# Patient Record
Sex: FEMALE | Race: WHITE | HISPANIC OR LATINO | Employment: OTHER | URBAN - METROPOLITAN AREA
[De-identification: names, ages, dates, MRNs, and addresses within clinical notes are randomized per-mention and may not be internally consistent; named-entity substitution may affect disease eponyms.]

---

## 2018-01-30 ENCOUNTER — OFFICE VISIT (OUTPATIENT)
Dept: UROLOGY | Facility: CLINIC | Age: 78
End: 2018-01-30
Payer: MEDICARE

## 2018-01-30 ENCOUNTER — APPOINTMENT (OUTPATIENT)
Dept: LAB | Facility: HOSPITAL | Age: 78
End: 2018-01-30
Attending: NURSE PRACTITIONER
Payer: MEDICARE

## 2018-01-30 VITALS
RESPIRATION RATE: 16 BRPM | TEMPERATURE: 98 F | HEIGHT: 63 IN | BODY MASS INDEX: 22.32 KG/M2 | WEIGHT: 126 LBS | HEART RATE: 87 BPM | DIASTOLIC BLOOD PRESSURE: 84 MMHG | SYSTOLIC BLOOD PRESSURE: 128 MMHG

## 2018-01-30 DIAGNOSIS — N95.2 ATROPHIC VAGINITIS: ICD-10-CM

## 2018-01-30 DIAGNOSIS — R39.89 SENSATION OF PRESSURE IN BLADDER AREA: Primary | ICD-10-CM

## 2018-01-30 DIAGNOSIS — N39.0 RECURRENT UTI (URINARY TRACT INFECTION): ICD-10-CM

## 2018-01-30 LAB
BACTERIA #/AREA URNS HPF: ABNORMAL /HPF
BILIRUB SERPL-MCNC: NEGATIVE MG/DL
BLOOD URINE, POC: NEGATIVE
COLOR, POC UA: ABNORMAL
GLUCOSE UR QL STRIP: NEGATIVE
KETONES UR QL STRIP: NEGATIVE
LEUKOCYTE ESTERASE URINE, POC: ABNORMAL
MICROSCOPIC COMMENT: ABNORMAL
NITRITE, POC UA: NEGATIVE
PH, POC UA: 6
PROTEIN, POC: ABNORMAL
RBC #/AREA URNS HPF: 0 /HPF (ref 0–4)
SPECIFIC GRAVITY, POC UA: 1.01
SQUAMOUS #/AREA URNS HPF: 1 /HPF
UROBILINOGEN, POC UA: NEGATIVE
WBC #/AREA URNS HPF: 50 /HPF (ref 0–5)

## 2018-01-30 PROCEDURE — 81000 URINALYSIS NONAUTO W/SCOPE: CPT

## 2018-01-30 PROCEDURE — 87088 URINE BACTERIA CULTURE: CPT

## 2018-01-30 PROCEDURE — 99204 OFFICE O/P NEW MOD 45 MIN: CPT | Mod: 25,S$PBB,, | Performed by: NURSE PRACTITIONER

## 2018-01-30 PROCEDURE — 51701 INSERT BLADDER CATHETER: CPT | Mod: PBBFAC,PN | Performed by: NURSE PRACTITIONER

## 2018-01-30 PROCEDURE — 51701 INSERT BLADDER CATHETER: CPT | Mod: S$PBB,,, | Performed by: NURSE PRACTITIONER

## 2018-01-30 PROCEDURE — 81001 URINALYSIS AUTO W/SCOPE: CPT | Mod: PBBFAC,PN | Performed by: NURSE PRACTITIONER

## 2018-01-30 PROCEDURE — 1126F AMNT PAIN NOTED NONE PRSNT: CPT | Mod: ,,, | Performed by: NURSE PRACTITIONER

## 2018-01-30 PROCEDURE — 99215 OFFICE O/P EST HI 40 MIN: CPT | Mod: PBBFAC,PN | Performed by: NURSE PRACTITIONER

## 2018-01-30 PROCEDURE — 99999 PR PBB SHADOW E&M-EST. PATIENT-LVL V: CPT | Mod: PBBFAC,,, | Performed by: NURSE PRACTITIONER

## 2018-01-30 PROCEDURE — 1159F MED LIST DOCD IN RCRD: CPT | Mod: ,,, | Performed by: NURSE PRACTITIONER

## 2018-01-30 PROCEDURE — 87086 URINE CULTURE/COLONY COUNT: CPT

## 2018-01-30 PROCEDURE — 87186 SC STD MICRODIL/AGAR DIL: CPT

## 2018-01-30 PROCEDURE — 87077 CULTURE AEROBIC IDENTIFY: CPT

## 2018-01-30 RX ORDER — PHENAZOPYRIDINE HYDROCHLORIDE 95 MG/1
95 TABLET ORAL 3 TIMES DAILY PRN
COMMUNITY
End: 2022-10-26

## 2018-01-30 RX ORDER — ALPRAZOLAM 0.25 MG/1
TABLET ORAL 2 TIMES DAILY PRN
COMMUNITY
Start: 2018-01-12 | End: 2020-12-21 | Stop reason: SDUPTHER

## 2018-01-30 RX ORDER — AMLODIPINE AND BENAZEPRIL HYDROCHLORIDE 10; 40 MG/1; MG/1
CAPSULE ORAL
COMMUNITY
Start: 2018-01-12 | End: 2020-12-21 | Stop reason: ALTCHOICE

## 2018-01-30 RX ORDER — FERROUS GLUCONATE 324(38)MG
324 TABLET ORAL
COMMUNITY
End: 2021-03-25 | Stop reason: SDUPTHER

## 2018-01-30 RX ORDER — ESTRADIOL 0.1 MG/G
1 CREAM VAGINAL
Qty: 12 G | Refills: 6 | Status: SHIPPED | OUTPATIENT
Start: 2018-01-31 | End: 2018-01-30 | Stop reason: SDUPTHER

## 2018-01-30 RX ORDER — CIPROFLOXACIN 500 MG/1
500 TABLET ORAL 2 TIMES DAILY
Qty: 6 TABLET | Refills: 0 | Status: SHIPPED | OUTPATIENT
Start: 2018-01-30 | End: 2018-02-02 | Stop reason: ALTCHOICE

## 2018-01-30 RX ORDER — ESTRADIOL 0.1 MG/G
1 CREAM VAGINAL
Qty: 12 G | Refills: 6 | Status: SHIPPED | OUTPATIENT
Start: 2018-01-31 | End: 2018-04-11 | Stop reason: ALTCHOICE

## 2018-01-30 NOTE — LETTER
January 30, 2018      Jacob Littlejohn MD  [address] [facility] [address]  [place] [address]           [place] [facility]  [address] [place] [address] [address]  [place] [address]  Phone: [phone]  Fax: [phone]          Patient: Martha Alonso   MR Number: [id number]   YOB: 1940   Date of Visit: 1/30/2018       Dear Dr. Jacob Littlejohn:    Thank you for referring Martha Alonso to me for evaluation. Attached you will find relevant portions of my assessment and plan of care.    If you have questions, please do not hesitate to call me. I look forward to following Martha Alonso along with you.    Sincerely,    Ruby Vance, [facility]    Enclosure  CC:  No Recipients    If you would like to receive this communication electronically, please contact [email or web address] or [phone] to request more information on [employer] access.    For providers and/or their staff who would like to refer a patient to [facility], please contact us through our one-stop-shop provider referral line, [facility], at [phone].    If you feel you have received this communication in error or would no longer like to receive these types of communications, please e-mail [email or web address]

## 2018-01-30 NOTE — PROGRESS NOTES
"[facility] Note  Staff: STEVO Apple    Referring provider and please cc: Dr. Jacob Littlejohn  PCP: Dr. Jacob Littlejohn    Chief Complaint: Recurrent UTIs    Subjective:        HPI: Martha Alonso is a 77 y.o. female NEW PATIENT to Urology clinic today referred by Dr. Jacob Littlejohn's office for recurrent urinary tract infections.  Pt is accompanied by daughter to clinic visit today, to assist her with her English, primary language is Divehi for patient.  Daughter states with pt today that in 2017 pt had "5" urinary tract infections and/or her symptoms presented as UTI symptoms.  Prior to 2017 pt never had UTIs or problems with her urination habits.    Her symptoms of onset normally include--bladder pressure/pain, increased urinary frequency and urgency.  Pt denies hematuria or incontinence issues.  Nocturia 1x nightly.    History of Urine Cultures from Dr. Littlejohn's office:  04/12/17:  Multiple organisms  05/19/17:  Multiple organisms  06/28/17:  Multiple organisms  09/18/17:  No growth    Caffeine intake daily: 2 coffee in am, the rest is water.  Constipation issues: None    REVIEW OF SYSTEMS:  Review of Systems   Constitutional: Negative for chills, diaphoresis, fever and weight loss.   HENT: Negative for congestion, hearing loss, nosebleeds and sore throat.    Eyes: Negative for blurred vision and pain.        Wears glasses   Respiratory: Negative for cough and wheezing.    Cardiovascular: Negative for chest pain, palpitations and leg swelling.   Gastrointestinal: Negative for abdominal pain, heartburn, nausea and vomiting.   Genitourinary: Positive for frequency and urgency. Negative for dysuria, flank pain and hematuria.        Bladder pressure   Musculoskeletal: Negative for back pain, joint pain, myalgias and neck pain.   Skin: Negative for itching and rash.   Neurological: Negative for dizziness, tremors, sensory change, seizures, loss of consciousness, weakness and " headaches.   Endo/Heme/Allergies: Does not bruise/bleed easily.   Psychiatric/Behavioral: Negative for depression and suicidal ideas. The patient is not nervous/anxious.      PMHx:  Past Medical History:   Diagnosis Date    Hypertension     Rheumatoid arthritis      Kidney stones: No    PSHx:  History reviewed. No pertinent surgical history.    Stents/Valves/Foreign Bodies: No  Cardiac Evaluation: No    Screening Studies  Colonoscopy: Yes, last procedure was done 2 years ago, showed normal findings.    Fam Hx:   malignancies: No , gyn malignancies: No   kidney stones: No     Social History     Social History    Marital status:      Spouse name: N/A    Number of children: N/A    Years of education: N/A     Social History Main Topics    Smoking status: Never Smoker    Smokeless tobacco: None    Alcohol use None    Drug use: Unknown    Sexual activity: Not Asked     Other Topics Concern    None     Social History Narrative    None     Allergies:  Patient has no known allergies.    Medications: reviewed   Anticoagulation: No    Objective:     Vitals:    01/30/18 1525   BP: 128/84   Pulse: 87   Resp: 16   Temp: 97.7 °F (36.5 °C)     Physical Exam   Vitals reviewed.  Constitutional: She is oriented to person, place, and time. She appears well-developed and well-nourished.   HENT:   Head: Normocephalic and atraumatic.   Eyes: Conjunctivae and EOM are normal. Pupils are equal, round, and reactive to light.   Neck: Normal range of motion. Neck supple.   Cardiovascular: Normal rate, regular rhythm, normal heart sounds and intact distal pulses.    Pulmonary/Chest: Effort normal and breath sounds normal.   Abdominal: Soft. Bowel sounds are normal.   Musculoskeletal: Normal range of motion.   Neurological: She is alert and oriented to person, place, and time. She has normal reflexes.   Skin: Skin is warm and dry.     Psychiatric: She has a normal mood and affect. Her behavior is normal. Judgment and thought  "content normal.     Pelvic exam  External Genitalia: normal hair distribution, no lesions  Urethral meatus: +Prolapse observed  Urethra: without tenderness or mass  Bladder: without fullness or tenderness  +Atrophic vaginitis  Anus and perineum: appear normal  In and out cath performed by me with 10 mL of residual collected    LABS REVIEW:  UA today:   Color, UA cloudy    Spec Grav UA 1.015    pH, UA 6.0    WBC, UA 2+    Nitrite, UA negative    Protein trace    Glucose, UA negative    Ketones, UA negative    Urobilinogen, UA negative    Bilirubin negative    Blood, UA negative      Assessment:       1. Sensation of pressure in bladder area    2. Recurrent UTI (urinary tract infection)    3. Atrophic vaginitis        Plan:   Recurrent UTIs vs. Interstitial cystitis symptoms, past cultures contaminated.    1.  UA Microscopic, Urine culture to be performed today.  2.  Renal US to be performed-pt will have done at [facility].  Orders given to pt and family to have done today after visit.  3.  Cipro 500 mg 1 po BID x 3 days prescribed to pt today until I receive culture results per family's request at this time.  4.  Estrace Cream prescribed to the patient today for vaginitis.    I have thoroughly explained to pt and family today due to hx of cultures returning as "multiple organisms" it is really inconclusive as to whether these were validated UTIs.  Therefore from now on, pt will need to have catheterized specimens in order to test in future.    F/u TBD after we review lab/imaging results for next POC.  Pt and family pleased with this POC at this time.    [facility]: N[facility]A    For your recurrent UTIs:  Behavioral changes to help decrease UTI recurrences   -increase water intake (6 to 8 bottles water per day)   -don't hold urine, void every 2 to 3 hours   -prevent constipation (miralax capful daily if necessary) to have a bowel movement daily and keep stools soft  -cut down on caffeine,drink mainly water  -no douching   -void " immediately after sexual intercourse  -wipe front to back     OTC meds to try (go to the pharmacist and ask where they are, they are over the counter)  -Use lactobacillus probiotic in capsule form in vagina once nightly for 10 days if you feel like you have an infection coming on   -cranberry pills 500mg tabs TID, can buy over the counter  -take a probiotic daily  -D-Mannose once daily over the counter    IMPORTANT: If you feel like you have a UTI coming on, call our office and talk to one of the nurses. We will have you drop off a urine here in clinic or at one of our [facility] facilities. I do not typically like to write for antibiotics unless we have a urine culture. Avoid going to urgent care. We need to start documenting your urine cultures here in our office to see if they are really recurrent UTIs or sx of UTIs.     If it is over the weekend and you cannot wait, call our on call doctor, however we still prefer urine cultures before giving antibiotics.     If you have fever and it doesn't improve with tylenol or ibuprofen or if you have flank pain associated with the uti symptoms go to the ER.     If you do continue to have positive urine cultures then we may proceed with doing a cystoscopy (to look in your bladder) and an imaging study.     Ruby Vance, JEAN CLAUDEC

## 2018-01-30 NOTE — PATIENT INSTRUCTIONS
Infecciones de las vías urinarias en las mujeres    Las infecciones de las vías urinarias suelen ocurrir a causa de bacterias que invaden las vías urinarias, deshaun sea desde fuera del cuerpo o desplazándose desde el recto o la vagina hasta el interior de la uretra. La anatomía de la echo hace más fácil la entrada de bacterias en las vías urinarias, por lo que estas infecciones son más frecuentes en las mujeres que en los hombres. El síntoma más común de la mayoría de estas infecciones es el dolor en las vías urinarias, etta la única manera de saber con certeza si hay brock infección es hacer un cultivo de orina (urocultivo).  Yoel tipos de infecciones de las vías urinarias  · Cistitis: La infección de la vejiga (cistitis) es la forma de infección urinaria más común en las mujeres. Puede provocar un deseo urgente o frecuente de orinar, así naomi fiebre, dolor, escozor al orinar y lizet en la orina.  · Uretritis: La uretritis consiste en la inflamación de la uretra. Puede causar dolor en la parte inferior del vientre o de la espalda, así naomi un deseo urgente o frecuente de orinar.  · Pielonefritis: La pielonefritis es brock infección de los riñones que puede causar daños graves a los mismos si se lyndsey sin tratar. En los casos más graves puede requerir hospitalización. Los síntomas pueden consistir en fiebre y dolor en la parte inferior de la espalda.  Medicamentos para tratar brock infección de las vías urinarias  La mayoría de las infecciones de las vías urinarias se tratan con antibióticos para eliminar las bacterias. La duración del tratamiento con antibióticos dependerá del tipo de infección que tenga, y puede ser de solo 3 días. Si tiene infecciones urinarias frecuentes, podría ser necesario un tratamiento antibiótico con dosis reducida eliazar varios meses. [place] los antibióticos hasta terminarlos y siga exactamente las indicaciones que le hayan dado. De lo contrario la infección puede persistir y hacerse más  difícil de tratar.  Cambios del estilo de medina para tratar y prevenir las infecciones urinarias  Los cambios de estilo de medina descritos a continuación pueden ayudarle a combatir vail infección actual de las vías urinarias y a prevenir nuevas infecciones en el futuro:  · Erica abundante líquido, naomi agua, jugo de fruta u otras bebidas sin cafeína. Miranda ayuda a eliminar las bacterias.   · Vacíe vail vejiga. Siempre vacíe la vejiga cuando sienta deseo de hacerlo y antes de acostarse. La orina que permanece en la vejiga promueve la infección. Trate de orinar antes y después de tener relaciones sexuales.  · Practique brock buena higiene personal. Límpiese deshaun con un movimiento desde adelante hacia atrás después de usar el inodoro. Miranda ayuda aevitar que las bacterias entren a la uretra.  · Utilice condones eliazar el sexo. Miranda ayuda a prevenir las infecciones en las vías urinarias provocadas por bacterias transmitidas sexualmente. Además, evite el uso de espermicidas en las relaciones sexuales, ya que pueden aumentar el riesgo de infecciones. Elija otra forma de control de la natalidad. Las mujeres que tienden a desarrollar infecciones en las vías urinarias después del sexo podrían utilizar un antibiótico preventivo de dosis baja. Asegúrese de hablar sobre estas opciones con vail proveedor de atención médica.  · Vaya a todas las visitas de control que le recomienden con vail proveedor de atención médica para que revise si las bacterias que causan la infección garrett sido eliminadas, y para que le dé tratamiento si los problemas reaparecen.  Date Last Reviewed: 9/8/2014  © 7093-2131 [employer]. [address] [place] [address], [place] [address]. Todos los derechos reservados. Esta información no pretende sustituir la atención médica profesional. Sólo vail médico puede diagnosticar y tratar un problema de hilary.        Infección De La Vejiga,Audra [Bladder Infection: Female, Adult]    Brock infección de la vejiga (cistitis  [cystitis - UTI]) suele provocar constantes deseos de orinar y ardor al orinar. Es posible que la orina se marcelina turbia u oscura, o que tenga olor kerline. Puede clayton dolor en la parte baja del abdomen. Brock infección de la vejiga se produce cuando las bacterias del área vaginal ingresan al orificio donde desemboca la vejiga (la uretra [urethra]). Puede ocurrir después de clayton tenido relaciones sexuales, por usar ropas muy ajustadas, por deshidratación y otros factores.  Cuidados En [place]  · Erica abundante líquido (al menos, entre 6 y 8 vasos por día, excepto que le hayan indicado limitar los líquidos por otras razones médicas). Eso hará que el medicamento ingrese mejor al sistema urinario y arrastrará las bacterias fuera de vail cuerpo.  · Evite tener relaciones sexuales hasta que los síntomas hayan desaparecido.  · No consuma cafeína, alcohol ni comidas muy condimentadas, ya que pueden irritar la vejiga.  · Brock infección de la vejiga (bladder infection) se trata con antibióticos (antibiotics). También es posible que le receten Pyridum (nombre genérico: fenazopiridina [phenazopyridine]) para aliviar el ardor. Kareen medicamento hará que vail orina sea de color naranja brillante. Es posible que sean orina de color naranja le manche la ropa. Puede usar un protector diario o brock toalla femenina para proteger la ropa.  Evite Futuras Infecciones  · Después de evacuar el intestino, siempre límpiese con un movimiento de adelante hacia atrás.  · Mantenga la jose l genital limpia y seca.  · Erica mucho líquidos todos los días para evitar la deshidratación (dehydration).  · Ambos integrantes de la lashawn deben lavarse antes del acto sexual.  · Orine brianna después del acto sexual para limpiar la vejiga.  · Use ropa interior de algodón y pantimedias con recubrimiento de algodón. Evite usar pantalones muy ajustados.  · Si está tomando píldoras anticonceptivas y tiene frecuentes infecciones de vejiga, háblelo con vail médico.  Visita De  Control  Visite a vail médico si no garrett desparecido todos los síntomas después de pardeep días de tratamiento.  Busque Prontamente Atención Médica  si algo de lo siguiente ocurre:  · Fiebre de 100.4°F (38°C) o más venessa, o naomi le haya indicado vail proveedor de atención médica.  · No hay mejoría después de pardeep días de tratamiento.  · Mayor dolor en la espalda o el abdomen.  · Vómito persistente (no puede mantener el medicamento en el estómago).  · Debilidad, mareo o desmayo.  · Secreción vaginal.  · Dolor, enrojecimiento o hinchazón en los labios vaginales (jose l exterior de la vagina).  Date Last Reviewed: 4/8/2014  © 1135-6561 [employer]. [address] [place] [address]. Todos los derechos reservados. Esta información no pretende sustituir la atención médica profesional. Sólo vail médico puede diagnosticar y tratar un problema de hilary.

## 2018-02-02 LAB — BACTERIA UR CULT: NORMAL

## 2018-02-02 RX ORDER — AMOXICILLIN AND CLAVULANATE POTASSIUM 875; 125 MG/1; MG/1
1 TABLET, FILM COATED ORAL 2 TIMES DAILY
Qty: 20 TABLET | Refills: 0 | Status: SHIPPED | OUTPATIENT
Start: 2018-02-02 | End: 2018-02-12

## 2018-02-02 RX ORDER — FLUCONAZOLE 150 MG/1
150 TABLET ORAL DAILY
Qty: 3 TABLET | Refills: 0 | Status: SHIPPED | OUTPATIENT
Start: 2018-02-02 | End: 2018-02-05

## 2018-02-06 ENCOUNTER — TELEPHONE (OUTPATIENT)
Dept: UROLOGY | Facility: CLINIC | Age: 78
End: 2018-02-06

## 2018-02-06 NOTE — TELEPHONE ENCOUNTER
----- Message from Sarah Lara sent at 2/6/2018 11:13 AM CST -----  Returning your call.  Please call Jim at [phone].

## 2018-04-05 ENCOUNTER — TELEPHONE (OUTPATIENT)
Dept: UROLOGY | Facility: CLINIC | Age: 78
End: 2018-04-05

## 2018-04-05 ENCOUNTER — TELEPHONE (OUTPATIENT)
Dept: UROGYNECOLOGY | Facility: CLINIC | Age: 78
End: 2018-04-05

## 2018-04-05 NOTE — TELEPHONE ENCOUNTER
----- Message from Misti Urban sent at 4/5/2018  3:52 PM CDT -----  Contact: Lexie Muñoz (Daughter)  Type:  Test Results    Who Called:  Lexie Muñoz (Daughter)  Name of Test (Lab/Mammo/Etc):  Ultrasound of Bladder  Date of Test: 3/27/18  Ordering Provider: Ruby Gamez  Where the test was performed:  [facility]  Best Call Back Number:    Additional Information:  Call to pod. No answer.

## 2018-04-05 NOTE — TELEPHONE ENCOUNTER
----- Message from Cristi Montoya sent at 4/4/2018  4:06 PM CDT -----  Contact: Pt Daughter  Patients daughter returning call. Call placed to pod, no answer    Call back# [phone]  Thanks

## 2018-04-05 NOTE — TELEPHONE ENCOUNTER
Left detailed message do not show where a call was from Urology.  Can call us [phone] if you know what call was in reference to.

## 2018-04-05 NOTE — TELEPHONE ENCOUNTER
Spoke to daughter, is calling for the results of the u/s. Called [facility] and placed the results on desk to be reviewed.

## 2018-04-10 DIAGNOSIS — N39.0 URINARY TRACT INFECTION WITHOUT HEMATURIA, SITE UNSPECIFIED: Primary | ICD-10-CM

## 2018-04-10 NOTE — TELEPHONE ENCOUNTER
Patient's daughter advised.  Patient is still c/o urinary pressure.  Would like to come in for a urine specimen tomorrow.       Please place orders for whatever you would like ordered.

## 2018-04-11 ENCOUNTER — APPOINTMENT (OUTPATIENT)
Dept: LAB | Facility: HOSPITAL | Age: 78
End: 2018-04-11
Attending: NURSE PRACTITIONER
Payer: MEDICARE

## 2018-04-11 ENCOUNTER — CLINICAL SUPPORT (OUTPATIENT)
Dept: UROLOGY | Facility: CLINIC | Age: 78
End: 2018-04-11
Payer: MEDICARE

## 2018-04-11 ENCOUNTER — TELEPHONE (OUTPATIENT)
Dept: UROLOGY | Facility: CLINIC | Age: 78
End: 2018-04-11

## 2018-04-11 DIAGNOSIS — N39.0 URINARY TRACT INFECTION WITHOUT HEMATURIA, SITE UNSPECIFIED: ICD-10-CM

## 2018-04-11 LAB
AMORPH CRY URNS QL MICRO: ABNORMAL
BACTERIA #/AREA URNS HPF: ABNORMAL /HPF
MICROSCOPIC COMMENT: ABNORMAL
RBC #/AREA URNS HPF: 2 /HPF (ref 0–4)
SQUAMOUS #/AREA URNS HPF: 1 /HPF
WBC #/AREA URNS HPF: 1 /HPF (ref 0–5)

## 2018-04-11 PROCEDURE — 99999 PR PBB SHADOW E&M-EST. PATIENT-LVL I: CPT | Mod: PBBFAC,,,

## 2018-04-11 PROCEDURE — 87086 URINE CULTURE/COLONY COUNT: CPT

## 2018-04-11 PROCEDURE — 81000 URINALYSIS NONAUTO W/SCOPE: CPT

## 2018-04-11 PROCEDURE — 99211 OFF/OP EST MAY X REQ PHY/QHP: CPT | Mod: PBBFAC,PN

## 2018-04-11 NOTE — PROGRESS NOTES
Patient here today for catheterized urine to be sent for culture.     Patient draped and prepped in sterile fashion.   10Fr catheter placed into bladder.    Sterile urine specimen obtained.  Catheter removed.   Patient left the office in satisfactory condition.  Specimen prepared to send to lab.

## 2018-04-11 NOTE — TELEPHONE ENCOUNTER
The estrogen cream she was prescribed in January is $300.  She has not been able to purchase this.  Her daughter is requesting a less expensive Rx.

## 2018-04-13 LAB — BACTERIA UR CULT: NO GROWTH

## 2020-01-14 ENCOUNTER — LAB VISIT (OUTPATIENT)
Dept: LAB | Facility: HOSPITAL | Age: 80
End: 2020-01-14
Attending: INTERNAL MEDICINE
Payer: MEDICARE

## 2020-01-14 DIAGNOSIS — Z13.220 SCREENING FOR LIPOID DISORDERS: ICD-10-CM

## 2020-01-14 DIAGNOSIS — I10 ESSENTIAL HYPERTENSION, MALIGNANT: Primary | ICD-10-CM

## 2020-01-14 DIAGNOSIS — D64.9 ANEMIA, UNSPECIFIED: ICD-10-CM

## 2020-01-14 LAB
ALBUMIN SERPL BCP-MCNC: 3.9 G/DL (ref 3.5–5.2)
ALP SERPL-CCNC: 63 U/L (ref 55–135)
ALT SERPL W/O P-5'-P-CCNC: 10 U/L (ref 10–44)
ANION GAP SERPL CALC-SCNC: 7 MMOL/L (ref 8–16)
AST SERPL-CCNC: 15 U/L (ref 10–40)
BASOPHILS # BLD AUTO: 0.01 K/UL (ref 0–0.2)
BASOPHILS NFR BLD: 0.2 % (ref 0–1.9)
BILIRUB SERPL-MCNC: 0.7 MG/DL (ref 0.1–1)
BILIRUB UR QL STRIP: NEGATIVE
BUN SERPL-MCNC: 14 MG/DL (ref 8–23)
CALCIUM SERPL-MCNC: 9.3 MG/DL (ref 8.7–10.5)
CHLORIDE SERPL-SCNC: 107 MMOL/L (ref 95–110)
CHOLEST SERPL-MCNC: 192 MG/DL (ref 120–199)
CHOLEST/HDLC SERPL: 4.2 {RATIO} (ref 2–5)
CLARITY UR: CLEAR
CO2 SERPL-SCNC: 24 MMOL/L (ref 23–29)
COLOR UR: YELLOW
CREAT SERPL-MCNC: 0.8 MG/DL (ref 0.5–1.4)
DIFFERENTIAL METHOD: ABNORMAL
EOSINOPHIL # BLD AUTO: 0.1 K/UL (ref 0–0.5)
EOSINOPHIL NFR BLD: 1.7 % (ref 0–8)
ERYTHROCYTE [DISTWIDTH] IN BLOOD BY AUTOMATED COUNT: 16.3 % (ref 11.5–14.5)
EST. GFR  (AFRICAN AMERICAN): >60 ML/MIN/1.73 M^2
EST. GFR  (NON AFRICAN AMERICAN): >60 ML/MIN/1.73 M^2
GLUCOSE SERPL-MCNC: 92 MG/DL (ref 70–110)
GLUCOSE UR QL STRIP: NEGATIVE
HCT VFR BLD AUTO: 38.4 % (ref 37–48.5)
HDLC SERPL-MCNC: 46 MG/DL (ref 40–75)
HDLC SERPL: 24 % (ref 20–50)
HGB BLD-MCNC: 11.5 G/DL (ref 12–16)
HGB UR QL STRIP: NEGATIVE
IMM GRANULOCYTES # BLD AUTO: 0.01 K/UL (ref 0–0.04)
IMM GRANULOCYTES NFR BLD AUTO: 0.2 % (ref 0–0.5)
KETONES UR QL STRIP: NEGATIVE
LDLC SERPL CALC-MCNC: 120.4 MG/DL (ref 63–159)
LEUKOCYTE ESTERASE UR QL STRIP: NEGATIVE
LYMPHOCYTES # BLD AUTO: 2.4 K/UL (ref 1–4.8)
LYMPHOCYTES NFR BLD: 51.8 % (ref 18–48)
MCH RBC QN AUTO: 25.4 PG (ref 27–31)
MCHC RBC AUTO-ENTMCNC: 29.9 G/DL (ref 32–36)
MCV RBC AUTO: 85 FL (ref 82–98)
MONOCYTES # BLD AUTO: 0.3 K/UL (ref 0.3–1)
MONOCYTES NFR BLD: 6.7 % (ref 4–15)
NEUTROPHILS # BLD AUTO: 1.8 K/UL (ref 1.8–7.7)
NEUTROPHILS NFR BLD: 39.4 % (ref 38–73)
NITRITE UR QL STRIP: NEGATIVE
NONHDLC SERPL-MCNC: 146 MG/DL
NRBC BLD-RTO: 0 /100 WBC
PH UR STRIP: 8 [PH] (ref 5–8)
PLATELET # BLD AUTO: 218 K/UL (ref 150–350)
PMV BLD AUTO: 11.2 FL (ref 9.2–12.9)
POTASSIUM SERPL-SCNC: 4.1 MMOL/L (ref 3.5–5.1)
PROT SERPL-MCNC: 6.8 G/DL (ref 6–8.4)
PROT UR QL STRIP: NEGATIVE
RBC # BLD AUTO: 4.52 M/UL (ref 4–5.4)
SODIUM SERPL-SCNC: 138 MMOL/L (ref 136–145)
SP GR UR STRIP: 1.02 (ref 1–1.03)
TRIGL SERPL-MCNC: 128 MG/DL (ref 30–150)
URN SPEC COLLECT METH UR: NORMAL
UROBILINOGEN UR STRIP-ACNC: NEGATIVE EU/DL
WBC # BLD AUTO: 4.63 K/UL (ref 3.9–12.7)

## 2020-01-14 PROCEDURE — 80061 LIPID PANEL: CPT

## 2020-01-14 PROCEDURE — 85025 COMPLETE CBC W/AUTO DIFF WBC: CPT

## 2020-01-14 PROCEDURE — 36415 COLL VENOUS BLD VENIPUNCTURE: CPT

## 2020-01-14 PROCEDURE — 81003 URINALYSIS AUTO W/O SCOPE: CPT

## 2020-01-14 PROCEDURE — 80053 COMPREHEN METABOLIC PANEL: CPT

## 2020-07-14 ENCOUNTER — LAB VISIT (OUTPATIENT)
Dept: LAB | Facility: HOSPITAL | Age: 80
End: 2020-07-14
Attending: INTERNAL MEDICINE
Payer: MEDICARE

## 2020-07-14 DIAGNOSIS — I10 ESSENTIAL HYPERTENSION, MALIGNANT: ICD-10-CM

## 2020-07-14 DIAGNOSIS — K86.3 CYST AND PSEUDOCYST OF PANCREAS: Primary | ICD-10-CM

## 2020-07-14 DIAGNOSIS — K86.2 CYST AND PSEUDOCYST OF PANCREAS: Primary | ICD-10-CM

## 2020-07-14 DIAGNOSIS — D64.9 ANEMIA, UNSPECIFIED: ICD-10-CM

## 2020-07-14 DIAGNOSIS — Z13.220 SCREENING FOR LIPOID DISORDERS: ICD-10-CM

## 2020-07-14 LAB
ALBUMIN SERPL BCP-MCNC: 4 G/DL (ref 3.5–5.2)
ALP SERPL-CCNC: 68 U/L (ref 55–135)
ALT SERPL W/O P-5'-P-CCNC: 10 U/L (ref 10–44)
ANION GAP SERPL CALC-SCNC: 10 MMOL/L (ref 8–16)
AST SERPL-CCNC: 14 U/L (ref 10–40)
BASOPHILS # BLD AUTO: 0.03 K/UL (ref 0–0.2)
BASOPHILS NFR BLD: 0.6 % (ref 0–1.9)
BILIRUB SERPL-MCNC: 0.7 MG/DL (ref 0.1–1)
BILIRUB UR QL STRIP: NEGATIVE
BUN SERPL-MCNC: 16 MG/DL (ref 8–23)
CALCIUM SERPL-MCNC: 9.7 MG/DL (ref 8.7–10.5)
CHLORIDE SERPL-SCNC: 105 MMOL/L (ref 95–110)
CHOLEST SERPL-MCNC: 226 MG/DL (ref 120–199)
CHOLEST/HDLC SERPL: 5.1 {RATIO} (ref 2–5)
CLARITY UR: CLEAR
CO2 SERPL-SCNC: 25 MMOL/L (ref 23–29)
COLOR UR: YELLOW
CREAT SERPL-MCNC: 1 MG/DL (ref 0.5–1.4)
DIFFERENTIAL METHOD: ABNORMAL
EOSINOPHIL # BLD AUTO: 0.1 K/UL (ref 0–0.5)
EOSINOPHIL NFR BLD: 1.5 % (ref 0–8)
ERYTHROCYTE [DISTWIDTH] IN BLOOD BY AUTOMATED COUNT: 16.1 % (ref 11.5–14.5)
EST. GFR  (AFRICAN AMERICAN): >60 ML/MIN/1.73 M^2
EST. GFR  (NON AFRICAN AMERICAN): 53 ML/MIN/1.73 M^2
GLUCOSE SERPL-MCNC: 95 MG/DL (ref 70–110)
GLUCOSE UR QL STRIP: NEGATIVE
HCT VFR BLD AUTO: 40.4 % (ref 37–48.5)
HDLC SERPL-MCNC: 44 MG/DL (ref 40–75)
HDLC SERPL: 19.5 % (ref 20–50)
HGB BLD-MCNC: 12.4 G/DL (ref 12–16)
HGB UR QL STRIP: NEGATIVE
IMM GRANULOCYTES # BLD AUTO: 0.02 K/UL (ref 0–0.04)
IMM GRANULOCYTES NFR BLD AUTO: 0.4 % (ref 0–0.5)
KETONES UR QL STRIP: NEGATIVE
LDLC SERPL CALC-MCNC: 150.4 MG/DL (ref 63–159)
LEUKOCYTE ESTERASE UR QL STRIP: NEGATIVE
LYMPHOCYTES # BLD AUTO: 2.4 K/UL (ref 1–4.8)
LYMPHOCYTES NFR BLD: 46.4 % (ref 18–48)
MCH RBC QN AUTO: 26 PG (ref 27–31)
MCHC RBC AUTO-ENTMCNC: 30.7 G/DL (ref 32–36)
MCV RBC AUTO: 85 FL (ref 82–98)
MONOCYTES # BLD AUTO: 0.4 K/UL (ref 0.3–1)
MONOCYTES NFR BLD: 7.7 % (ref 4–15)
NEUTROPHILS # BLD AUTO: 2.3 K/UL (ref 1.8–7.7)
NEUTROPHILS NFR BLD: 43.4 % (ref 38–73)
NITRITE UR QL STRIP: NEGATIVE
NONHDLC SERPL-MCNC: 182 MG/DL
NRBC BLD-RTO: 0 /100 WBC
PH UR STRIP: 7 [PH] (ref 5–8)
PLATELET # BLD AUTO: 216 K/UL (ref 150–350)
PMV BLD AUTO: 10.8 FL (ref 9.2–12.9)
POTASSIUM SERPL-SCNC: 4.1 MMOL/L (ref 3.5–5.1)
PROT SERPL-MCNC: 7.1 G/DL (ref 6–8.4)
PROT UR QL STRIP: NEGATIVE
RBC # BLD AUTO: 4.77 M/UL (ref 4–5.4)
SODIUM SERPL-SCNC: 140 MMOL/L (ref 136–145)
SP GR UR STRIP: 1.02 (ref 1–1.03)
TRIGL SERPL-MCNC: 158 MG/DL (ref 30–150)
URN SPEC COLLECT METH UR: ABNORMAL
UROBILINOGEN UR STRIP-ACNC: ABNORMAL EU/DL
WBC # BLD AUTO: 5.19 K/UL (ref 3.9–12.7)

## 2020-07-14 PROCEDURE — 36415 COLL VENOUS BLD VENIPUNCTURE: CPT

## 2020-07-14 PROCEDURE — 85025 COMPLETE CBC W/AUTO DIFF WBC: CPT

## 2020-07-14 PROCEDURE — 80053 COMPREHEN METABOLIC PANEL: CPT

## 2020-07-14 PROCEDURE — 81003 URINALYSIS AUTO W/O SCOPE: CPT

## 2020-07-14 PROCEDURE — 80061 LIPID PANEL: CPT

## 2020-12-21 ENCOUNTER — OFFICE VISIT (OUTPATIENT)
Dept: FAMILY MEDICINE | Facility: CLINIC | Age: 80
End: 2020-12-21
Payer: MEDICARE

## 2020-12-21 VITALS
BODY MASS INDEX: 23.63 KG/M2 | HEIGHT: 63 IN | HEART RATE: 83 BPM | DIASTOLIC BLOOD PRESSURE: 74 MMHG | OXYGEN SATURATION: 96 % | WEIGHT: 133.38 LBS | SYSTOLIC BLOOD PRESSURE: 130 MMHG | TEMPERATURE: 97 F

## 2020-12-21 DIAGNOSIS — D50.9 IRON DEFICIENCY ANEMIA, UNSPECIFIED IRON DEFICIENCY ANEMIA TYPE: ICD-10-CM

## 2020-12-21 DIAGNOSIS — M19.071 PRIMARY OSTEOARTHRITIS OF BOTH FEET: ICD-10-CM

## 2020-12-21 DIAGNOSIS — K86.2 CYST OF PANCREAS: ICD-10-CM

## 2020-12-21 DIAGNOSIS — I10 ESSENTIAL HYPERTENSION: ICD-10-CM

## 2020-12-21 DIAGNOSIS — E78.2 MIXED HYPERLIPIDEMIA: ICD-10-CM

## 2020-12-21 DIAGNOSIS — F41.9 ANXIETY: ICD-10-CM

## 2020-12-21 DIAGNOSIS — Z76.89 ENCOUNTER TO ESTABLISH CARE: Primary | ICD-10-CM

## 2020-12-21 DIAGNOSIS — M19.072 PRIMARY OSTEOARTHRITIS OF BOTH FEET: ICD-10-CM

## 2020-12-21 PROBLEM — D64.9 ANEMIA: Status: RESOLVED | Noted: 2020-12-21 | Resolved: 2020-12-21

## 2020-12-21 PROBLEM — D64.9 ANEMIA: Status: ACTIVE | Noted: 2020-12-21

## 2020-12-21 PROCEDURE — 99204 OFFICE O/P NEW MOD 45 MIN: CPT | Mod: S$GLB,,, | Performed by: NURSE PRACTITIONER

## 2020-12-21 PROCEDURE — 99204 PR OFFICE/OUTPT VISIT, NEW, LEVL IV, 45-59 MIN: ICD-10-PCS | Mod: S$GLB,,, | Performed by: NURSE PRACTITIONER

## 2020-12-21 RX ORDER — BENAZEPRIL HYDROCHLORIDE 20 MG/1
TABLET ORAL
COMMUNITY
Start: 2020-12-15 | End: 2020-12-21 | Stop reason: SDUPTHER

## 2020-12-21 RX ORDER — AMLODIPINE BESYLATE 5 MG/1
5 TABLET ORAL DAILY
Qty: 90 TABLET | Refills: 1 | Status: SHIPPED | OUTPATIENT
Start: 2020-12-21 | End: 2021-09-20

## 2020-12-21 RX ORDER — ALPRAZOLAM 0.25 MG/1
0.25 TABLET ORAL 2 TIMES DAILY
Qty: 60 TABLET | Refills: 0 | Status: SHIPPED | OUTPATIENT
Start: 2020-12-21 | End: 2021-01-19

## 2020-12-21 RX ORDER — AMLODIPINE BESYLATE 5 MG/1
TABLET ORAL
COMMUNITY
Start: 2020-12-15 | End: 2020-12-21 | Stop reason: SDUPTHER

## 2020-12-21 RX ORDER — DICLOFENAC SODIUM 10 MG/G
GEL TOPICAL
COMMUNITY
Start: 2020-12-15 | End: 2021-07-08 | Stop reason: SDUPTHER

## 2020-12-21 RX ORDER — BENAZEPRIL HYDROCHLORIDE 20 MG/1
20 TABLET ORAL DAILY
Qty: 90 TABLET | Refills: 1 | Status: SHIPPED | OUTPATIENT
Start: 2020-12-21 | End: 2021-09-20

## 2020-12-21 NOTE — PATIENT INSTRUCTIONS
Guidelines for General Prevention of COVID-19    o Take steps to protect yourself from COVID-19. Perform hand hygiene frequently. Wash your hands often with soap and water for at least 20 seconds of use and alcohol-based hand , covering all surfaces of your hands and rubbing them together until they feel dry.  o Avoid touching your eyes, nose, and mouth with unwashed hands.  o Avoid close contact with people and stay home if youre sick, except to get medical care.   o Cover coughs and sneezes with a tissue, or use the inside of your elbow. Immediately wash your hands or use hand .     For more information, see CDC link below:    https://www.cdc.gov/coronavirus/2019-ncov/hcp/guidance-prevent-spread.html#precautions  Low-Cholesterol Diet  Your body needs cholesterol to build new cells and create certain hormones. There are 2 kinds of cholesterol in your blood:     · HDL (good) cholesterol. This prevents fat deposits (plaque) from building up in your arteries. In this way it protects against heart disease and stroke.  · LDL (bad) cholesterol. This stays in your body and sticks to artery walls. Over time it may block blood flow to the heart and brain. This can cause a heart attack or stroke.  The cholesterol in your blood comes from 2 sources: cholesterol in food that you eat and cholesterol that your liver makes. You should limit the amount of cholesterol in your diet. But the cholesterol that your body makes has the greatest disease risk. And your body makes more cholesterol when your diet is high in bad fats (saturated and trans fats). There are 2 kinds of fats you can eat:  · Good fats, or unsaturated fats (mono-unsaturated and poly-unsaturated). They raise the level of good cholesterol and lower the level of bad cholesterol. Good fats are found in vegetable oils such as olive, sunflower, corn, and soybean oils, and in nuts and seeds.  · Bad fats, or saturated fats (including foods high in  cholesterol) and trans fats. These raise your risk of disease. They lower the good cholesterol and raise the level of bad cholesterol. Bad fats are found in animal products, including meat, whole-milk dairy products, and butter. Some plants are also high in bad fats (coconut and palm plants). Trans fats are found in hard (stick) margarines. They are also in many fast foods and commercially baked goods. Soft margarine sold in tubs has fewer trans fats and is safer to use.  High blood cholesterol is usually due to a diet high in saturated fat, along with not being physically active. In some cases, genetics plays a role in causing high cholesterol. The tips below will help you create healthy eating habits that will help lower your blood cholesterol level.  Create a diet high in good fats, low in bad fats (and low in cholesterol)  The following steps will help you create a diet high in good fats and low in bad fats:  · Talk with your doctor before starting a low cholesterol diet or weight loss program.  · Learn to read nutrition labels and select appropriate portion sizes.  · When cooking, use plant-based unsaturated vegetable oils (sunflower, corn, soybean, canola, peanut, and olive oils).  · Avoid saturated fats found in animal products such as meat, dairy (whole-milk, cheese and ice cream), poultry skin, and egg yolks. Plants high in saturated oils include coconut oil, palm oil, and palm kernel oil.  · If you eat meat, choose smaller portions and lean cuts, such as round, luma, sirloin, or loin. Eat more meatless meals.  · Replace meat with fish at least 2 times a week. Fish is an important source of the unsaturated fat called omega-3 fatty acids. This fat has potential to lower the risk of heart disease.  · Replace whole-milk dairy products with low-fat or nonfat products. Try soy products. Soy helps to reduce total cholesterol.  · Supplement your diet with protective fibers. Eat nuts, seeds, and whole grains  rather than white rice and bread. These foods lower both cholesterol and triglyceride levels. (Triglycerides are another fat found in the blood.) Walnuts are one of the best sources of omega-3 fatty acids.  · Eat plenty of fresh fruits and vegetables daily.  · Avoid fast foods and commercial baked goods. Assume they contain saturated fat unless labeled otherwise.  Date Last Reviewed: 8/1/2016  © 1762-2639 [employer]. [address] [place] [address], [place] [address]. All rights reserved. This information is not intended as a substitute for professional medical care. Always follow your healthcare professional's instructions.

## 2020-12-21 NOTE — PROGRESS NOTES
SUBJECTIVE:      Patient ID: Martha Alonso is a 80 y.o. female.    Chief Complaint: Establish Care (needs new order for CT Abdomen w/ and without, ICD-10: K86.2) and Hypertension    Martha Alonso, a former patient of Dr. Littlejohn, presents to the clinic to establish care.  Her medical problems are detailed in Past Medical History and Problem List.  She is present with her daughter.      She reports increased anxiety due to COVID-19 and a recent death in the family due to COVID-19.  She has needed to take her Xanax twice daily.      She needs a refill of her amlodipine and benazepril.  Blood pressures have been controlled.      She has a history of anemia.  Previously on iron supplements, but stopped 5 months ago.  Hgb 12.4 & Hct 40.4 in July.      She had a cyst on her pancreas that Dr. Littlejohn was monitoring.  She was supposed to get a CT of abdomen for further evaluation, but never got the CT due to COVID-19.          No family history on file.   Social History     Socioeconomic History    Marital status:      Spouse name: Not on file    Number of children: Not on file    Years of education: Not on file    Highest education level: Not on file   Occupational History    Not on file   Social Needs    Financial resource strain: Not on file    Food insecurity     Worry: Not on file     Inability: Not on file    Transportation needs     Medical: Not on file     Non-medical: Not on file   Tobacco Use    Smoking status: Never Smoker   Substance and Sexual Activity    Alcohol use: Not on file    Drug use: Not on file    Sexual activity: Not on file   Lifestyle    Physical activity     Days per week: Not on file     Minutes per session: Not on file    Stress: Not on file   Relationships    Social connections     Talks on phone: Not on file     Gets together: Not on file     Attends [employer] service: Not on file     Active member of club or organization: Not on file     Attends meetings of clubs or  organizations: Not on file     Relationship status: Not on file   Other Topics Concern    Not on file   Social History Narrative    Not on file     Current Outpatient Medications   Medication Sig Dispense Refill    ALPRAZolam (XANAX) 0.25 MG tablet Take 1 tablet (0.25 mg total) by mouth 2 (two) times a day. 60 tablet 0    amLODIPine (NORVASC) 5 MG tablet Take 1 tablet (5 mg total) by mouth once daily. 90 tablet 1    benazepriL (LOTENSIN) 20 MG tablet Take 1 tablet (20 mg total) by mouth once daily. 90 tablet 1    diclofenac sodium (VOLTAREN) 1 % Gel       ferrous gluconate (FERGON) 324 MG tablet Take 324 mg by mouth daily with breakfast.      phenazopyridine (PYRIDIUM) 95 MG tablet Take 95 mg by mouth 3 (three) times daily as needed for Pain.       No current facility-administered medications for this visit.      Review of patient's allergies indicates:  No Known Allergies   Past Medical History:   Diagnosis Date    Hypertension     Rheumatoid arthritis      No past surgical history on file.    Review of Systems   Constitutional: Negative for activity change, appetite change, chills, diaphoresis, fatigue, fever and unexpected weight change.   HENT: Negative for congestion, ear pain, sinus pressure, sore throat, trouble swallowing and voice change.    Eyes: Negative for pain, discharge and visual disturbance.   Respiratory: Negative for cough, chest tightness and wheezing.    Cardiovascular: Negative for chest pain, palpitations and leg swelling.   Gastrointestinal: Negative for abdominal pain, constipation, diarrhea and vomiting.   Genitourinary: Negative for difficulty urinating, dysuria, flank pain, frequency, hematuria and urgency.   Musculoskeletal: Negative for back pain, joint swelling and neck pain.   Skin: Negative for color change and rash.   Neurological: Negative for seizures, syncope, weakness, numbness and headaches.   Hematological: Negative for adenopathy.   Psychiatric/Behavioral: Negative  "for confusion, dysphoric mood and sleep disturbance. The patient is nervous/anxious.         Anxiety      OBJECTIVE:      Vitals:    12/21/20 1513   BP: 130/74   BP Location: Left arm   Patient Position: Sitting   BP Method: Large (Manual)   Pulse: 83   Temp: 97.3 °F (36.3 °C)   SpO2: 96%   Weight: 60.5 kg (133 lb 6.4 oz)   Height: 5' 3" (1.6 m)     Physical Exam  Vitals signs and nursing note reviewed.   Constitutional:       General: She is awake. She is not in acute distress.     Appearance: Normal appearance. She is not ill-appearing, toxic-appearing or diaphoretic.   HENT:      Head: Normocephalic and atraumatic.      Nose: Nose normal.   Eyes:      General: Lids are normal. Gaze aligned appropriately.      Conjunctiva/sclera: Conjunctivae normal.      Right eye: Right conjunctiva is not injected.      Left eye: Left conjunctiva is not injected.      Pupils: Pupils are equal, round, and reactive to light.   Neck:      Musculoskeletal: Neck supple.      Thyroid: No thyromegaly.   Cardiovascular:      Rate and Rhythm: Normal rate and regular rhythm.      Pulses: Normal pulses.      Heart sounds: Normal heart sounds, S1 normal and S2 normal. No murmur. No friction rub. No gallop.    Pulmonary:      Effort: Pulmonary effort is normal. No respiratory distress.      Breath sounds: Normal breath sounds. No stridor. No decreased breath sounds, wheezing, rhonchi or rales.   Abdominal:      General: Bowel sounds are normal.      Palpations: Abdomen is soft.      Tenderness: There is no abdominal tenderness. There is no guarding or rebound.   Musculoskeletal:      Right lower leg: No edema.      Left lower leg: No edema.   Lymphadenopathy:      Cervical: No cervical adenopathy.   Skin:     General: Skin is warm and dry.      Capillary Refill: Capillary refill takes less than 2 seconds.      Findings: No erythema or rash.   Neurological:      Mental Status: She is alert and oriented to person, place, and time. Mental " status is at baseline.   Psychiatric:         Attention and Perception: Attention normal.         Mood and Affect: Mood normal.         Speech: Speech normal.         Behavior: Behavior normal. Behavior is cooperative.         Thought Content: Thought content normal. Thought content does not include suicidal ideation. Thought content does not include suicidal plan.        Assessment:       1. Encounter to establish care    2. Essential hypertension    3. Anxiety    4. Mixed hyperlipidemia    5. Anemia, unspecified type    6. Cyst of pancreas    7. Primary osteoarthritis of both feet        Plan:       Encounter to establish care   New patient.  Will start getting her routine care and overdue health maintenance completed.  Advised patient about age and season appropriate immunizations/ cancer screenings. Influenza yearly and Tdap vaccine ever 10 years.  Patient refused DXA scan.     Essential hypertension   Stable, continue current treatment.   -     amLODIPine (NORVASC) 5 MG tablet; Take 1 tablet (5 mg total) by mouth once daily.  Dispense: 90 tablet; Refill: 1  -     benazepriL (LOTENSIN) 20 MG tablet; Take 1 tablet (20 mg total) by mouth once daily.  Dispense: 90 tablet; Refill: 1  -     CBC Auto Differential; Future; Expected date: 12/21/2020  -     Comprehensive Metabolic Panel; Future; Expected date: 12/21/2020  -     Urinalysis; Future  -     Lipid Panel; Future; Expected date: 12/21/2020    Anxiety   Xanax quantity increased, due to increased anxiety.  Patient to follow-up [id number] for continued refills.  Patient and family verbalized understanding.     -     ALPRAZolam (XANAX) 0.25 MG tablet; Take 1 tablet (0.25 mg total) by mouth 2 (two) times a day.  Dispense: 60 tablet; Refill: 0    Mixed hyperlipidemia   LDL elevated.  Low cholesterol diet provided on discharge instructions.  Will recheck lipid panel.   -     Comprehensive Metabolic Panel; Future; Expected date: 12/21/2020  -     Lipid Panel; Future;  Expected date: 12/21/2020    Iron deficiency anemia, unspecified iron deficiency anemia type   Stable and controlled at this time.  Will recheck CBC and restart iron supplements if needed.   -     CBC Auto Differential; Future; Expected date: 12/21/2020    Cyst of pancreas   CT scan reordered.  Previous order was written by Dr. Littlejohn who is retiring.  Patient to fast 4 hours before CT scan.  Will need kidney function checked prior to CT as well.    -     CT Abdomen With Without Contrast; Future; Expected date: 12/21/2020    Primary osteoarthritis of both feet   Stable, uses diclofenac gel as needed.     This note was created using [employer] voice recognition software that occasionally misinterprets phrases or words.     Follow up in about 3 months (around 3/21/2021) for anxiety .      12/21/2020 ELINA Johnston, AARONP

## 2021-01-12 ENCOUNTER — TELEPHONE (OUTPATIENT)
Dept: FAMILY MEDICINE | Facility: CLINIC | Age: 81
End: 2021-01-12

## 2021-03-24 ENCOUNTER — PATIENT MESSAGE (OUTPATIENT)
Dept: FAMILY MEDICINE | Facility: CLINIC | Age: 81
End: 2021-03-24

## 2021-03-25 ENCOUNTER — LAB VISIT (OUTPATIENT)
Dept: LAB | Facility: HOSPITAL | Age: 81
End: 2021-03-25
Attending: NURSE PRACTITIONER
Payer: MEDICARE

## 2021-03-25 ENCOUNTER — TELEPHONE (OUTPATIENT)
Dept: FAMILY MEDICINE | Facility: CLINIC | Age: 81
End: 2021-03-25

## 2021-03-25 DIAGNOSIS — D50.9 IRON DEFICIENCY ANEMIA, UNSPECIFIED IRON DEFICIENCY ANEMIA TYPE: Primary | ICD-10-CM

## 2021-03-25 DIAGNOSIS — R82.90 ABNORMAL URINE FINDING: Primary | ICD-10-CM

## 2021-03-25 DIAGNOSIS — I10 ESSENTIAL HYPERTENSION: ICD-10-CM

## 2021-03-25 DIAGNOSIS — E78.2 MIXED HYPERLIPIDEMIA: ICD-10-CM

## 2021-03-25 DIAGNOSIS — D50.9 IRON DEFICIENCY ANEMIA, UNSPECIFIED IRON DEFICIENCY ANEMIA TYPE: ICD-10-CM

## 2021-03-25 LAB
ALBUMIN SERPL BCP-MCNC: 4 G/DL (ref 3.5–5.2)
ALP SERPL-CCNC: 67 U/L (ref 55–135)
ALT SERPL W/O P-5'-P-CCNC: 9 U/L (ref 10–44)
ANION GAP SERPL CALC-SCNC: 8 MMOL/L (ref 8–16)
AST SERPL-CCNC: 14 U/L (ref 10–40)
BACTERIA #/AREA URNS HPF: NEGATIVE /HPF
BASOPHILS # BLD AUTO: 0.02 K/UL (ref 0–0.2)
BASOPHILS NFR BLD: 0.4 % (ref 0–1.9)
BILIRUB SERPL-MCNC: 0.7 MG/DL (ref 0.1–1)
BILIRUB UR QL STRIP: NEGATIVE
BUN SERPL-MCNC: 11 MG/DL (ref 8–23)
CALCIUM SERPL-MCNC: 9.1 MG/DL (ref 8.7–10.5)
CHLORIDE SERPL-SCNC: 105 MMOL/L (ref 95–110)
CHOLEST SERPL-MCNC: 200 MG/DL (ref 120–199)
CHOLEST/HDLC SERPL: 3.7 {RATIO} (ref 2–5)
CLARITY UR: CLEAR
CO2 SERPL-SCNC: 24 MMOL/L (ref 23–29)
COLOR UR: YELLOW
CREAT SERPL-MCNC: 1 MG/DL (ref 0.5–1.4)
DIFFERENTIAL METHOD: ABNORMAL
EOSINOPHIL # BLD AUTO: 0.2 K/UL (ref 0–0.5)
EOSINOPHIL NFR BLD: 3.5 % (ref 0–8)
ERYTHROCYTE [DISTWIDTH] IN BLOOD BY AUTOMATED COUNT: 18.6 % (ref 11.5–14.5)
EST. GFR  (AFRICAN AMERICAN): >60 ML/MIN/1.73 M^2
EST. GFR  (NON AFRICAN AMERICAN): 52.6 ML/MIN/1.73 M^2
GLUCOSE SERPL-MCNC: 93 MG/DL (ref 70–110)
GLUCOSE UR QL STRIP: NEGATIVE
HCT VFR BLD AUTO: 36.4 % (ref 37–48.5)
HDLC SERPL-MCNC: 54 MG/DL (ref 40–75)
HDLC SERPL: 27 % (ref 20–50)
HGB BLD-MCNC: 10.7 G/DL (ref 12–16)
HGB UR QL STRIP: NEGATIVE
HYALINE CASTS #/AREA URNS LPF: 23 /LPF
IMM GRANULOCYTES # BLD AUTO: 0.01 K/UL (ref 0–0.04)
IMM GRANULOCYTES NFR BLD AUTO: 0.2 % (ref 0–0.5)
KETONES UR QL STRIP: NEGATIVE
LDLC SERPL CALC-MCNC: 126.8 MG/DL (ref 63–159)
LEUKOCYTE ESTERASE UR QL STRIP: ABNORMAL
LYMPHOCYTES # BLD AUTO: 2.3 K/UL (ref 1–4.8)
LYMPHOCYTES NFR BLD: 45.8 % (ref 18–48)
MCH RBC QN AUTO: 22.9 PG (ref 27–31)
MCHC RBC AUTO-ENTMCNC: 29.4 G/DL (ref 32–36)
MCV RBC AUTO: 78 FL (ref 82–98)
MICROSCOPIC COMMENT: ABNORMAL
MONOCYTES # BLD AUTO: 0.4 K/UL (ref 0.3–1)
MONOCYTES NFR BLD: 7.3 % (ref 4–15)
NEUTROPHILS # BLD AUTO: 2.1 K/UL (ref 1.8–7.7)
NEUTROPHILS NFR BLD: 42.8 % (ref 38–73)
NITRITE UR QL STRIP: POSITIVE
NONHDLC SERPL-MCNC: 146 MG/DL
NRBC BLD-RTO: 0 /100 WBC
PH UR STRIP: 7 [PH] (ref 5–8)
PLATELET # BLD AUTO: 261 K/UL (ref 150–350)
PMV BLD AUTO: 10.7 FL (ref 9.2–12.9)
POTASSIUM SERPL-SCNC: 4.2 MMOL/L (ref 3.5–5.1)
PROT SERPL-MCNC: 6.9 G/DL (ref 6–8.4)
PROT UR QL STRIP: NEGATIVE
RBC # BLD AUTO: 4.67 M/UL (ref 4–5.4)
RBC #/AREA URNS HPF: 1 /HPF (ref 0–4)
SODIUM SERPL-SCNC: 137 MMOL/L (ref 136–145)
SP GR UR STRIP: 1.01 (ref 1–1.03)
SQUAMOUS #/AREA URNS HPF: 4 /HPF
TRIGL SERPL-MCNC: 96 MG/DL (ref 30–150)
URN SPEC COLLECT METH UR: ABNORMAL
UROBILINOGEN UR STRIP-ACNC: 1 EU/DL
WBC # BLD AUTO: 4.91 K/UL (ref 3.9–12.7)
WBC #/AREA URNS HPF: 3 /HPF (ref 0–5)

## 2021-03-25 PROCEDURE — 80061 LIPID PANEL: CPT | Performed by: NURSE PRACTITIONER

## 2021-03-25 PROCEDURE — 81001 URINALYSIS AUTO W/SCOPE: CPT | Performed by: NURSE PRACTITIONER

## 2021-03-25 PROCEDURE — 80053 COMPREHEN METABOLIC PANEL: CPT | Performed by: NURSE PRACTITIONER

## 2021-03-25 PROCEDURE — 85025 COMPLETE CBC W/AUTO DIFF WBC: CPT | Performed by: NURSE PRACTITIONER

## 2021-03-25 PROCEDURE — 36415 COLL VENOUS BLD VENIPUNCTURE: CPT | Performed by: NURSE PRACTITIONER

## 2021-03-25 RX ORDER — FERROUS GLUCONATE 324(38)MG
324 TABLET ORAL
Qty: 90 TABLET | Refills: 0 | COMMUNITY
Start: 2021-03-25 | End: 2022-01-19 | Stop reason: SDUPTHER

## 2021-03-29 ENCOUNTER — OFFICE VISIT (OUTPATIENT)
Dept: FAMILY MEDICINE | Facility: CLINIC | Age: 81
End: 2021-03-29
Payer: MEDICARE

## 2021-03-29 VITALS
TEMPERATURE: 98 F | HEART RATE: 99 BPM | SYSTOLIC BLOOD PRESSURE: 128 MMHG | HEIGHT: 63 IN | BODY MASS INDEX: 23.89 KG/M2 | OXYGEN SATURATION: 96 % | WEIGHT: 134.81 LBS | DIASTOLIC BLOOD PRESSURE: 78 MMHG

## 2021-03-29 DIAGNOSIS — F41.9 ANXIETY: Primary | ICD-10-CM

## 2021-03-29 DIAGNOSIS — I10 ESSENTIAL HYPERTENSION: ICD-10-CM

## 2021-03-29 DIAGNOSIS — E78.2 MIXED HYPERLIPIDEMIA: ICD-10-CM

## 2021-03-29 DIAGNOSIS — D50.8 IRON DEFICIENCY ANEMIA SECONDARY TO INADEQUATE DIETARY IRON INTAKE: ICD-10-CM

## 2021-03-29 DIAGNOSIS — M25.473 ANKLE EDEMA: ICD-10-CM

## 2021-03-29 DIAGNOSIS — R11.0 NAUSEA: ICD-10-CM

## 2021-03-29 PROCEDURE — 99215 OFFICE O/P EST HI 40 MIN: CPT | Performed by: NURSE PRACTITIONER

## 2021-03-29 PROCEDURE — 99214 PR OFFICE/OUTPT VISIT, EST, LEVL IV, 30-39 MIN: ICD-10-PCS | Mod: S$PBB,,, | Performed by: NURSE PRACTITIONER

## 2021-03-29 PROCEDURE — 99214 OFFICE O/P EST MOD 30 MIN: CPT | Mod: S$PBB,,, | Performed by: NURSE PRACTITIONER

## 2021-03-29 RX ORDER — MESALAMINE 1000 MG/1
SUPPOSITORY RECTAL
COMMUNITY
End: 2022-10-26

## 2021-03-29 RX ORDER — ALBUTEROL SULFATE 90 UG/1
2 AEROSOL, METERED RESPIRATORY (INHALATION)
COMMUNITY
Start: 2021-03-04 | End: 2022-10-26

## 2021-03-29 RX ORDER — ONDANSETRON 4 MG/1
4 TABLET, ORALLY DISINTEGRATING ORAL EVERY 12 HOURS PRN
Qty: 21 TABLET | Refills: 0 | Status: SHIPPED | OUTPATIENT
Start: 2021-03-29

## 2021-03-29 RX ORDER — ALPRAZOLAM 0.25 MG/1
0.25 TABLET ORAL 2 TIMES DAILY PRN
Qty: 60 TABLET | Refills: 0 | Status: SHIPPED | OUTPATIENT
Start: 2021-03-29 | End: 2021-05-11

## 2021-03-29 RX ORDER — ONDANSETRON 4 MG/1
TABLET, ORALLY DISINTEGRATING ORAL
COMMUNITY
End: 2021-03-29 | Stop reason: SDUPTHER

## 2021-07-01 ENCOUNTER — HOSPITAL ENCOUNTER (OUTPATIENT)
Dept: RADIOLOGY | Facility: HOSPITAL | Age: 81
Discharge: HOME | End: 2021-07-01
Attending: NURSE PRACTITIONER
Payer: MEDICARE

## 2021-07-01 DIAGNOSIS — K86.2 CYST OF PANCREAS: ICD-10-CM

## 2021-07-01 LAB
CREAT SERPL-MCNC: 1 MG/DL (ref 0.5–1.4)
SAMPLE: NORMAL

## 2021-07-01 PROCEDURE — 74170 CT ABD WO CNTRST FLWD CNTRST: CPT | Mod: TC,PO

## 2021-07-01 PROCEDURE — 25500020 PHARM REV CODE 255: Mod: PO | Performed by: NURSE PRACTITIONER

## 2021-07-01 RX ADMIN — IOHEXOL 100 ML: 350 INJECTION, SOLUTION INTRAVENOUS at 02:07

## 2021-07-08 ENCOUNTER — OFFICE VISIT (OUTPATIENT)
Dept: FAMILY MEDICINE | Facility: CLINIC | Age: 81
End: 2021-07-08
Payer: MEDICARE

## 2021-07-08 VITALS
DIASTOLIC BLOOD PRESSURE: 70 MMHG | OXYGEN SATURATION: 96 % | WEIGHT: 133 LBS | RESPIRATION RATE: 18 BRPM | TEMPERATURE: 98 F | BODY MASS INDEX: 23.57 KG/M2 | HEART RATE: 92 BPM | SYSTOLIC BLOOD PRESSURE: 134 MMHG | HEIGHT: 63 IN

## 2021-07-08 DIAGNOSIS — M15.9 PRIMARY OSTEOARTHRITIS INVOLVING MULTIPLE JOINTS: ICD-10-CM

## 2021-07-08 DIAGNOSIS — I10 ESSENTIAL HYPERTENSION: ICD-10-CM

## 2021-07-08 DIAGNOSIS — D50.8 IRON DEFICIENCY ANEMIA SECONDARY TO INADEQUATE DIETARY IRON INTAKE: ICD-10-CM

## 2021-07-08 DIAGNOSIS — K86.2 CYST OF PANCREAS: ICD-10-CM

## 2021-07-08 DIAGNOSIS — K59.00 CONSTIPATION, UNSPECIFIED CONSTIPATION TYPE: ICD-10-CM

## 2021-07-08 DIAGNOSIS — F41.9 ANXIETY: Primary | ICD-10-CM

## 2021-07-08 DIAGNOSIS — E78.2 MIXED HYPERLIPIDEMIA: ICD-10-CM

## 2021-07-08 PROCEDURE — 99214 OFFICE O/P EST MOD 30 MIN: CPT | Mod: S$PBB,,, | Performed by: NURSE PRACTITIONER

## 2021-07-08 PROCEDURE — 99214 PR OFFICE/OUTPT VISIT, EST, LEVL IV, 30-39 MIN: ICD-10-PCS | Mod: S$PBB,,, | Performed by: NURSE PRACTITIONER

## 2021-07-08 PROCEDURE — 99214 OFFICE O/P EST MOD 30 MIN: CPT | Performed by: NURSE PRACTITIONER

## 2021-07-08 RX ORDER — DICLOFENAC SODIUM 10 MG/G
GEL TOPICAL 2 TIMES DAILY
Qty: 150 G | Refills: 0 | Status: SHIPPED | OUTPATIENT
Start: 2021-07-08 | End: 2021-10-08 | Stop reason: SDUPTHER

## 2021-07-08 RX ORDER — ALPRAZOLAM 0.25 MG/1
0.25 TABLET ORAL 2 TIMES DAILY PRN
Qty: 60 TABLET | Refills: 0 | Status: SHIPPED | OUTPATIENT
Start: 2021-07-08 | End: 2021-09-07

## 2021-07-08 RX ORDER — CELECOXIB 100 MG/1
100 CAPSULE ORAL 2 TIMES DAILY
Qty: 60 CAPSULE | Refills: 1 | Status: SHIPPED | OUTPATIENT
Start: 2021-07-08 | End: 2021-07-08

## 2021-10-08 ENCOUNTER — OFFICE VISIT (OUTPATIENT)
Dept: FAMILY MEDICINE | Facility: CLINIC | Age: 81
End: 2021-10-08
Payer: MEDICARE

## 2021-10-08 VITALS
HEART RATE: 81 BPM | DIASTOLIC BLOOD PRESSURE: 70 MMHG | HEIGHT: 63 IN | OXYGEN SATURATION: 96 % | RESPIRATION RATE: 18 BRPM | TEMPERATURE: 98 F | SYSTOLIC BLOOD PRESSURE: 118 MMHG | BODY MASS INDEX: 23.33 KG/M2 | WEIGHT: 131.69 LBS

## 2021-10-08 DIAGNOSIS — M15.9 PRIMARY OSTEOARTHRITIS INVOLVING MULTIPLE JOINTS: ICD-10-CM

## 2021-10-08 DIAGNOSIS — F41.9 ANXIETY: Primary | ICD-10-CM

## 2021-10-08 DIAGNOSIS — K21.9 GASTROESOPHAGEAL REFLUX DISEASE, UNSPECIFIED WHETHER ESOPHAGITIS PRESENT: ICD-10-CM

## 2021-10-08 DIAGNOSIS — Z23 NEED FOR INFLUENZA VACCINATION: ICD-10-CM

## 2021-10-08 PROCEDURE — 99214 PR OFFICE/OUTPT VISIT, EST, LEVL IV, 30-39 MIN: ICD-10-PCS | Mod: S$PBB,,, | Performed by: NURSE PRACTITIONER

## 2021-10-08 PROCEDURE — 99214 OFFICE O/P EST MOD 30 MIN: CPT | Mod: S$PBB,,, | Performed by: NURSE PRACTITIONER

## 2021-10-08 PROCEDURE — 90662 IIV NO PRSV INCREASED AG IM: CPT | Mod: PBBFAC | Performed by: NURSE PRACTITIONER

## 2021-10-08 PROCEDURE — 99214 OFFICE O/P EST MOD 30 MIN: CPT | Performed by: NURSE PRACTITIONER

## 2021-10-08 PROCEDURE — G0008 ADMIN INFLUENZA VIRUS VAC: HCPCS | Mod: PBBFAC | Performed by: NURSE PRACTITIONER

## 2021-10-08 RX ORDER — ALPRAZOLAM 0.25 MG/1
TABLET ORAL
Qty: 60 TABLET | Refills: 0 | Status: SHIPPED | OUTPATIENT
Start: 2021-10-08 | End: 2021-11-09

## 2021-10-08 RX ORDER — DICLOFENAC SODIUM 10 MG/G
GEL TOPICAL 2 TIMES DAILY
Qty: 450 G | Refills: 0 | Status: SHIPPED | OUTPATIENT
Start: 2021-10-08 | End: 2022-02-24 | Stop reason: SDUPTHER

## 2021-10-08 RX ORDER — FAMOTIDINE 20 MG/1
20 TABLET, FILM COATED ORAL 2 TIMES DAILY
Qty: 60 TABLET | Refills: 1 | Status: SHIPPED | OUTPATIENT
Start: 2021-10-08 | End: 2021-10-13 | Stop reason: SDUPTHER

## 2021-10-13 DIAGNOSIS — K21.9 GASTROESOPHAGEAL REFLUX DISEASE, UNSPECIFIED WHETHER ESOPHAGITIS PRESENT: ICD-10-CM

## 2021-10-13 RX ORDER — FAMOTIDINE 20 MG/1
20 TABLET, FILM COATED ORAL 2 TIMES DAILY
Qty: 180 TABLET | Refills: 1 | Status: SHIPPED | OUTPATIENT
Start: 2021-10-13 | End: 2022-11-07 | Stop reason: SDUPTHER

## 2022-01-17 ENCOUNTER — LAB VISIT (OUTPATIENT)
Dept: LAB | Facility: HOSPITAL | Age: 82
End: 2022-01-17
Attending: NURSE PRACTITIONER
Payer: MEDICARE

## 2022-01-17 DIAGNOSIS — F41.9 ANXIETY: ICD-10-CM

## 2022-01-17 DIAGNOSIS — D50.8 IRON DEFICIENCY ANEMIA SECONDARY TO INADEQUATE DIETARY IRON INTAKE: ICD-10-CM

## 2022-01-17 DIAGNOSIS — I10 ESSENTIAL HYPERTENSION: ICD-10-CM

## 2022-01-17 DIAGNOSIS — E78.2 MIXED HYPERLIPIDEMIA: ICD-10-CM

## 2022-01-17 LAB
ALBUMIN SERPL BCP-MCNC: 4.2 G/DL (ref 3.5–5.2)
ALBUMIN/CREAT UR: 9.6 UG/MG (ref 0–30)
ALP SERPL-CCNC: 66 U/L (ref 55–135)
ALT SERPL W/O P-5'-P-CCNC: 10 U/L (ref 10–44)
ANION GAP SERPL CALC-SCNC: 8 MMOL/L (ref 8–16)
AST SERPL-CCNC: 14 U/L (ref 10–40)
BACTERIA #/AREA URNS HPF: NEGATIVE /HPF
BASOPHILS # BLD AUTO: 0.03 K/UL (ref 0–0.2)
BASOPHILS NFR BLD: 0.6 % (ref 0–1.9)
BILIRUB SERPL-MCNC: 0.8 MG/DL (ref 0.1–1)
BILIRUB UR QL STRIP: NEGATIVE
BUN SERPL-MCNC: 15 MG/DL (ref 8–23)
CALCIUM SERPL-MCNC: 9.7 MG/DL (ref 8.7–10.5)
CHLORIDE SERPL-SCNC: 106 MMOL/L (ref 95–110)
CHOLEST SERPL-MCNC: 201 MG/DL (ref 120–199)
CHOLEST/HDLC SERPL: 4.4 {RATIO} (ref 2–5)
CLARITY UR: CLEAR
CO2 SERPL-SCNC: 24 MMOL/L (ref 23–29)
COLOR UR: YELLOW
CREAT SERPL-MCNC: 1 MG/DL (ref 0.5–1.4)
CREAT UR-MCNC: 149 MG/DL (ref 15–325)
DIFFERENTIAL METHOD: ABNORMAL
EOSINOPHIL # BLD AUTO: 0.1 K/UL (ref 0–0.5)
EOSINOPHIL NFR BLD: 2.8 % (ref 0–8)
ERYTHROCYTE [DISTWIDTH] IN BLOOD BY AUTOMATED COUNT: 19.9 % (ref 11.5–14.5)
EST. GFR  (AFRICAN AMERICAN): >60 ML/MIN/1.73 M^2
EST. GFR  (NON AFRICAN AMERICAN): 52.2 ML/MIN/1.73 M^2
GLUCOSE SERPL-MCNC: 100 MG/DL (ref 70–110)
GLUCOSE UR QL STRIP: NEGATIVE
HCT VFR BLD AUTO: 40.1 % (ref 37–48.5)
HDLC SERPL-MCNC: 46 MG/DL (ref 40–75)
HDLC SERPL: 22.9 % (ref 20–50)
HGB BLD-MCNC: 11.8 G/DL (ref 12–16)
HGB UR QL STRIP: NEGATIVE
HYALINE CASTS #/AREA URNS LPF: 26 /LPF
IMM GRANULOCYTES # BLD AUTO: 0 K/UL (ref 0–0.04)
IMM GRANULOCYTES NFR BLD AUTO: 0 % (ref 0–0.5)
KETONES UR QL STRIP: NEGATIVE
LDLC SERPL CALC-MCNC: 130.8 MG/DL (ref 63–159)
LEUKOCYTE ESTERASE UR QL STRIP: ABNORMAL
LYMPHOCYTES # BLD AUTO: 2.3 K/UL (ref 1–4.8)
LYMPHOCYTES NFR BLD: 48.7 % (ref 18–48)
MCH RBC QN AUTO: 23.4 PG (ref 27–31)
MCHC RBC AUTO-ENTMCNC: 29.4 G/DL (ref 32–36)
MCV RBC AUTO: 80 FL (ref 82–98)
MICROALBUMIN UR DL<=1MG/L-MCNC: 14.3 UG/ML
MICROSCOPIC COMMENT: ABNORMAL
MONOCYTES # BLD AUTO: 0.4 K/UL (ref 0.3–1)
MONOCYTES NFR BLD: 8.4 % (ref 4–15)
NEUTROPHILS # BLD AUTO: 1.8 K/UL (ref 1.8–7.7)
NEUTROPHILS NFR BLD: 39.5 % (ref 38–73)
NITRITE UR QL STRIP: NEGATIVE
NONHDLC SERPL-MCNC: 155 MG/DL
NRBC BLD-RTO: 0 /100 WBC
PH UR STRIP: 6 [PH] (ref 5–8)
PLATELET # BLD AUTO: 210 K/UL (ref 150–450)
PMV BLD AUTO: 10.5 FL (ref 9.2–12.9)
POTASSIUM SERPL-SCNC: 4.3 MMOL/L (ref 3.5–5.1)
PROT SERPL-MCNC: 7.2 G/DL (ref 6–8.4)
PROT UR QL STRIP: NEGATIVE
RBC # BLD AUTO: 5.04 M/UL (ref 4–5.4)
RBC #/AREA URNS HPF: 1 /HPF (ref 0–4)
SODIUM SERPL-SCNC: 138 MMOL/L (ref 136–145)
SP GR UR STRIP: 1.02 (ref 1–1.03)
SQUAMOUS #/AREA URNS HPF: 4 /HPF
TRIGL SERPL-MCNC: 121 MG/DL (ref 30–150)
TSH SERPL DL<=0.005 MIU/L-ACNC: 1.04 UIU/ML (ref 0.34–5.6)
URN SPEC COLLECT METH UR: ABNORMAL
UROBILINOGEN UR STRIP-ACNC: NEGATIVE EU/DL
WBC # BLD AUTO: 4.62 K/UL (ref 3.9–12.7)
WBC #/AREA URNS HPF: 5 /HPF (ref 0–5)

## 2022-01-17 PROCEDURE — 80061 LIPID PANEL: CPT | Performed by: NURSE PRACTITIONER

## 2022-01-17 PROCEDURE — 80053 COMPREHEN METABOLIC PANEL: CPT | Performed by: NURSE PRACTITIONER

## 2022-01-17 PROCEDURE — 81001 URINALYSIS AUTO W/SCOPE: CPT | Performed by: NURSE PRACTITIONER

## 2022-01-17 PROCEDURE — 84443 ASSAY THYROID STIM HORMONE: CPT | Performed by: NURSE PRACTITIONER

## 2022-01-17 PROCEDURE — 85025 COMPLETE CBC W/AUTO DIFF WBC: CPT | Performed by: NURSE PRACTITIONER

## 2022-01-17 PROCEDURE — 36415 COLL VENOUS BLD VENIPUNCTURE: CPT | Performed by: NURSE PRACTITIONER

## 2022-01-17 PROCEDURE — 82570 ASSAY OF URINE CREATININE: CPT | Performed by: NURSE PRACTITIONER

## 2022-01-17 PROCEDURE — 82043 UR ALBUMIN QUANTITATIVE: CPT | Performed by: NURSE PRACTITIONER

## 2022-01-19 ENCOUNTER — OFFICE VISIT (OUTPATIENT)
Dept: FAMILY MEDICINE | Facility: CLINIC | Age: 82
End: 2022-01-19
Payer: MEDICARE

## 2022-01-19 VITALS
WEIGHT: 132 LBS | RESPIRATION RATE: 18 BRPM | BODY MASS INDEX: 23.39 KG/M2 | DIASTOLIC BLOOD PRESSURE: 80 MMHG | SYSTOLIC BLOOD PRESSURE: 136 MMHG | OXYGEN SATURATION: 97 % | HEIGHT: 63 IN | HEART RATE: 90 BPM

## 2022-01-19 DIAGNOSIS — D50.8 IRON DEFICIENCY ANEMIA SECONDARY TO INADEQUATE DIETARY IRON INTAKE: ICD-10-CM

## 2022-01-19 DIAGNOSIS — Z78.0 ASYMPTOMATIC POSTMENOPAUSAL STATE: ICD-10-CM

## 2022-01-19 DIAGNOSIS — E78.2 MIXED HYPERLIPIDEMIA: ICD-10-CM

## 2022-01-19 DIAGNOSIS — I10 ESSENTIAL HYPERTENSION: ICD-10-CM

## 2022-01-19 DIAGNOSIS — F41.9 ANXIETY: Primary | ICD-10-CM

## 2022-01-19 DIAGNOSIS — N18.31 STAGE 3A CHRONIC KIDNEY DISEASE: ICD-10-CM

## 2022-01-19 PROCEDURE — 99214 PR OFFICE/OUTPT VISIT, EST, LEVL IV, 30-39 MIN: ICD-10-PCS | Mod: S$PBB,,, | Performed by: NURSE PRACTITIONER

## 2022-01-19 PROCEDURE — 99215 OFFICE O/P EST HI 40 MIN: CPT | Performed by: NURSE PRACTITIONER

## 2022-01-19 PROCEDURE — 99214 OFFICE O/P EST MOD 30 MIN: CPT | Mod: S$PBB,,, | Performed by: NURSE PRACTITIONER

## 2022-01-19 RX ORDER — FERROUS GLUCONATE 324(38)MG
324 TABLET ORAL
Qty: 90 TABLET | Refills: 0 | COMMUNITY
Start: 2022-01-19 | End: 2022-10-31 | Stop reason: SDUPTHER

## 2022-01-19 NOTE — PROGRESS NOTES
SUBJECTIVE:      Patient ID: Martha Alonso is a 81 y.o. female.    Chief Complaint: Follow-up and Anxiety    81-year-old female presents to the clinic for anxiety follow-up.  Patient has been doing well, but does reports increased anxiety recently.  Her daughter was recently diagnosed with cancer and is starting chemotherapy.  Patient uses Xanax as need for anxiety.  Offered additional medication to help with anxiety, but patient states it is manageable.  Her mood is stable today.  Denies SI.    Recently had labs completed.  Thyroid levels within normal limits.  Anemia is improving with daily iron supplements.  Kidney function is stable, but is gradually declining. Creatinine 1.0, BUN 15, and GFR 52.2.  Cholesterol is stable, but slightly worse than previous.      Anxiety  Presents for follow-up visit. Symptoms include excessive worry, irritability, nervous/anxious behavior and restlessness. Patient reports no chest pain, confusion, dizziness, hyperventilation, nausea, palpitations or shortness of breath. Symptoms occur occasionally. The severity of symptoms is mild. The quality of sleep is good. Nighttime awakenings: occasional.     Compliance with medications is %. Treatment side effects: None.   Hypertension  This is a chronic problem. The current episode started more than 1 year ago. The problem is controlled. Associated symptoms include anxiety. Pertinent negatives include no blurred vision, chest pain, headaches, malaise/fatigue, neck pain, orthopnea, palpitations, peripheral edema, PND, shortness of breath or sweats. There are no associated agents to hypertension. Risk factors for coronary artery disease include dyslipidemia, sedentary lifestyle and post-menopausal state. Past treatments include ACE inhibitors and calcium channel blockers. The current treatment provides significant improvement. There are no compliance problems.  There is no history of chronic renal disease.       History reviewed.  No pertinent family history.   Social History     Socioeconomic History    Marital status:    Occupational History    Occupation: retired   Tobacco Use    Smoking status: Never Smoker    Smokeless tobacco: Never Used   Substance and Sexual Activity    Drug use: Never    Sexual activity: Not Currently     Current Outpatient Medications   Medication Sig Dispense Refill    ALPRAZolam (XANAX) 0.25 MG tablet TAKE 1 TABLET(0.25 MG) BY MOUTH TWICE DAILY AS NEEDED FOR ANXIETY 60 tablet 0    amLODIPine (NORVASC) 5 MG tablet TAKE 1 TABLET(5 MG) BY MOUTH EVERY DAY 90 tablet 1    benazepriL (LOTENSIN) 20 MG tablet TAKE 1 TABLET(20 MG) BY MOUTH EVERY DAY 90 tablet 1    diclofenac sodium (VOLTAREN) 1 % Gel Apply topically 2 (two) times daily. 450 g 0    famotidine (PEPCID) 20 MG tablet Take 1 tablet (20 mg total) by mouth 2 (two) times daily. 180 tablet 1    mesalamine (CANASA) 1000 MG Supp Canasa 1,000 mg rectal suppository      ondansetron (ZOFRAN-ODT) 4 MG TbDL Take 1 tablet (4 mg total) by mouth every 12 (twelve) hours as needed (nausea). 21 tablet 0    phenazopyridine (PYRIDIUM) 95 MG tablet Take 95 mg by mouth 3 (three) times daily as needed for Pain.      VENTOLIN HFA 90 mcg/actuation inhaler Inhale 2 puffs into the lungs every 4 to 6 hours as needed.      ferrous gluconate (FERGON) 324 MG tablet Take 1 tablet (324 mg total) by mouth daily with breakfast. 90 tablet 0     No current facility-administered medications for this visit.     Review of patient's allergies indicates:  No Known Allergies   Past Medical History:   Diagnosis Date    Hypertension     Rheumatoid arthritis      History reviewed. No pertinent surgical history.    Review of Systems   Constitutional: Positive for irritability. Negative for activity change, appetite change, chills, diaphoresis, fatigue, fever, malaise/fatigue and unexpected weight change.   HENT: Negative for congestion, ear pain, hearing loss, rhinorrhea, sinus  "pressure, sore throat, trouble swallowing and voice change.    Eyes: Negative for blurred vision, pain, discharge and visual disturbance.   Respiratory: Negative for cough, chest tightness, shortness of breath and wheezing.    Cardiovascular: Negative for chest pain, palpitations, orthopnea and PND.        Hypertension    Gastrointestinal: Negative for abdominal pain, blood in stool, constipation, diarrhea, nausea and vomiting.        GERD   Endocrine: Negative for polydipsia and polyuria.   Genitourinary: Negative for difficulty urinating, dysuria, flank pain, frequency, hematuria, menstrual problem and urgency.   Musculoskeletal: Positive for arthralgias. Negative for back pain, joint swelling and neck pain.   Skin: Negative for color change and rash.   Neurological: Negative for dizziness, seizures, syncope, weakness, numbness and headaches.   Hematological: Negative for adenopathy.   Psychiatric/Behavioral: Negative for confusion, dysphoric mood and sleep disturbance. The patient is nervous/anxious.       OBJECTIVE:      Vitals:    01/19/22 1055   BP: 136/80   BP Location: Left arm   Patient Position: Sitting   BP Method: Medium (Manual)   Pulse: 90   Resp: 18   SpO2: 97%   Weight: 59.9 kg (132 lb)   Height: 5' 3" (1.6 m)     Physical Exam  Vitals and nursing note reviewed.   Constitutional:       General: She is awake. She is not in acute distress.     Appearance: Normal appearance. She is well-developed and normal weight. She is not ill-appearing, toxic-appearing or diaphoretic.   HENT:      Head: Normocephalic and atraumatic.      Nose: Nose normal.   Eyes:      General: Lids are normal. Gaze aligned appropriately.      Conjunctiva/sclera: Conjunctivae normal.      Right eye: Right conjunctiva is not injected.      Left eye: Left conjunctiva is not injected.      Pupils: Pupils are equal, round, and reactive to light.   Cardiovascular:      Rate and Rhythm: Normal rate and regular rhythm.      Pulses: Normal " pulses.      Heart sounds: Normal heart sounds, S1 normal and S2 normal. No murmur heard.  No friction rub. No gallop.    Pulmonary:      Effort: Pulmonary effort is normal. No respiratory distress.      Breath sounds: Normal breath sounds. No stridor. No decreased breath sounds, wheezing, rhonchi or rales.   Chest:      Chest wall: No tenderness.   Musculoskeletal:      Cervical back: Neck supple.      Right lower leg: No edema.      Left lower leg: No edema.   Lymphadenopathy:      Cervical: No cervical adenopathy.   Skin:     General: Skin is warm and dry.      Capillary Refill: Capillary refill takes less than 2 seconds.      Coloration: Skin is not jaundiced.      Findings: No erythema or rash.   Neurological:      Mental Status: She is alert and oriented to person, place, and time. Mental status is at baseline.   Psychiatric:         Attention and Perception: Attention normal.         Mood and Affect: Mood normal.         Speech: Speech normal.         Behavior: Behavior normal. Behavior is cooperative.         Thought Content: Thought content normal.         Judgment: Judgment normal.         Lab Visit on 01/17/2022   Component Date Value Ref Range Status    WBC 01/17/2022 4.62  3.90 - 12.70 K/uL Final    RBC 01/17/2022 5.04  4.00 - 5.40 M/uL Final    Hemoglobin 01/17/2022 11.8* 12.0 - 16.0 g/dL Final    Hematocrit 01/17/2022 40.1  37.0 - 48.5 % Final    MCV 01/17/2022 80* 82 - 98 fL Final    MCH 01/17/2022 23.4* 27.0 - 31.0 pg Final    MCHC 01/17/2022 29.4* 32.0 - 36.0 g/dL Final    RDW 01/17/2022 19.9* 11.5 - 14.5 % Final    Platelets 01/17/2022 210  150 - 450 K/uL Final    MPV 01/17/2022 10.5  9.2 - 12.9 fL Final    Immature Granulocytes 01/17/2022 0.0  0.0 - 0.5 % Final    Gran # (ANC) 01/17/2022 1.8  1.8 - 7.7 K/uL Final    Immature Grans (Abs) 01/17/2022 0.00  0.00 - 0.04 K/uL Final    Comment: Mild elevation in immature granulocytes is non specific and   can be seen in a variety of  conditions including stress response,   acute inflammation, trauma and pregnancy. Correlation with other   laboratory and clinical findings is essential.      Lymph # 01/17/2022 2.3  1.0 - 4.8 K/uL Final    Mono # 01/17/2022 0.4  0.3 - 1.0 K/uL Final    Eos # 01/17/2022 0.1  0.0 - 0.5 K/uL Final    Baso # 01/17/2022 0.03  0.00 - 0.20 K/uL Final    nRBC 01/17/2022 0  0 /100 WBC Final    Gran % 01/17/2022 39.5  38.0 - 73.0 % Final    Lymph % 01/17/2022 48.7* 18.0 - 48.0 % Final    Mono % 01/17/2022 8.4  4.0 - 15.0 % Final    Eosinophil % 01/17/2022 2.8  0.0 - 8.0 % Final    Basophil % 01/17/2022 0.6  0.0 - 1.9 % Final    Differential Method 01/17/2022 Automated   Final    Sodium 01/17/2022 138  136 - 145 mmol/L Final    Potassium 01/17/2022 4.3  3.5 - 5.1 mmol/L Final    Chloride 01/17/2022 106  95 - 110 mmol/L Final    CO2 01/17/2022 24  23 - 29 mmol/L Final    Glucose 01/17/2022 100  70 - 110 mg/dL Final    BUN 01/17/2022 15  8 - 23 mg/dL Final    Creatinine 01/17/2022 1.0  0.5 - 1.4 mg/dL Final    Calcium 01/17/2022 9.7  8.7 - 10.5 mg/dL Final    Total Protein 01/17/2022 7.2  6.0 - 8.4 g/dL Final    Albumin 01/17/2022 4.2  3.5 - 5.2 g/dL Final    Total Bilirubin 01/17/2022 0.8  0.1 - 1.0 mg/dL Final    Comment: For infants and newborns, interpretation of results should be based  on gestational age, weight and in agreement with clinical  observations.    Premature Infant recommended reference ranges:  Up to 24 hours.............<8.0 mg/dL  Up to 48 hours............<12.0 mg/dL  3-5 days..................<15.0 mg/dL  6-29 days.................<15.0 mg/dL      Alkaline Phosphatase 01/17/2022 66  55 - 135 U/L Final    AST 01/17/2022 14  10 - 40 U/L Final    ALT 01/17/2022 10  10 - 44 U/L Final    Anion Gap 01/17/2022 8  8 - 16 mmol/L Final    eGFR if African American 01/17/2022 >60.0  >60 mL/min/1.73 m^2 Final    eGFR if non  01/17/2022 52.2* >60 mL/min/1.73 m^2 Final     Comment: Calculation used to obtain the estimated glomerular filtration  rate (eGFR) is the CKD-EPI equation.       Cholesterol 01/17/2022 201* 120 - 199 mg/dL Final    Comment: The National Cholesterol Education Program (NCEP) has set the  following guidelines (reference ranges) for Cholesterol:  Optimal.....................<200 mg/dL  Borderline High.............200-239 mg/dL  High........................> or = 240 mg/dL      Triglycerides 01/17/2022 121  30 - 150 mg/dL Final    Comment: The National Cholesterol Education Program (NCEP) has set the  following guidelines (reference values) for triglycerides:  Normal......................<150 mg/dL  Borderline High.............150-199 mg/dL  High........................200-499 mg/dL      HDL 01/17/2022 46  40 - 75 mg/dL Final    Comment: The National Cholesterol Education Program (NCEP) has set the  following guidelines (reference values) for HDL Cholesterol:  Low...............<40 mg/dL  Optimal...........>60 mg/dL      LDL Cholesterol 01/17/2022 130.8  63.0 - 159.0 mg/dL Final    Comment: The National Cholesterol Education Program (NCEP) has set the  following guidelines (reference values) for LDL Cholesterol:  Optimal.......................<130 mg/dL  Borderline High...............130-159 mg/dL  High..........................160-189 mg/dL  Very High.....................>190 mg/dL      HDL/Cholesterol Ratio 01/17/2022 22.9  20.0 - 50.0 % Final    Total Cholesterol/HDL Ratio 01/17/2022 4.4  2.0 - 5.0 Final    Non-HDL Cholesterol 01/17/2022 155  mg/dL Final    Comment: Risk category and Non-HDL cholesterol goals:  Coronary heart disease (CHD)or equivalent (10-year risk of CHD >20%):  Non-HDL cholesterol goal     <130 mg/dL  Two or more CHD risk factors and 10-year risk of CHD <= 20%:  Non-HDL cholesterol goal     <160 mg/dL  0 to 1 CHD risk factor:  Non-HDL cholesterol goal     <190 mg/dL      Microalbumin, Urine 01/17/2022 14.3  <19.9 ug/mL Final     Creatinine, Urine 01/17/2022 149.0  15.0 - 325.0 mg/dL Final    Comment: The random urine reference ranges provided were established   for 24 hour urine collections.  No reference ranges exist for  random urine specimens.  Correlate clinically.      Microalb/Creat Ratio 01/17/2022 9.6  0.0 - 30.0 ug/mg Final    Specimen UA 01/17/2022 Urine, Clean Catch   Final    Color, UA 01/17/2022 Yellow  Yellow, Straw, [place] Final    Appearance, UA 01/17/2022 Clear  Clear Final    pH, UA 01/17/2022 6.0  5.0 - 8.0 Final    Specific Gravity, UA 01/17/2022 1.020  1.005 - 1.030 Final    Protein, UA 01/17/2022 Negative  Negative Final    Comment: Recommend a 24 hour urine protein or a urine   protein/creatinine ratio if globulin induced proteinuria is  clinically suspected.      Glucose, UA 01/17/2022 Negative  Negative Final    Ketones, UA 01/17/2022 Negative  Negative Final    Bilirubin (UA) 01/17/2022 Negative  Negative Final    Occult Blood UA 01/17/2022 Negative  Negative Final    Nitrite, UA 01/17/2022 Negative  Negative Final    Urobilinogen, UA 01/17/2022 Negative  Negative EU/dL Final    Leukocytes, UA 01/17/2022 1+* Negative Final    TSH 01/17/2022 1.040  0.340 - 5.600 uIU/mL Final    RBC, UA 01/17/2022 1  0 - 4 /hpf Final    WBC, UA 01/17/2022 5  0 - 5 /hpf Final    Bacteria 01/17/2022 Negative  None-Occ /hpf Final    Squam Epithel, UA 01/17/2022 4  /hpf Final    Hyaline Casts, UA 01/17/2022 26* 0-1/lpf /lpf Final    Microscopic Comment 01/17/2022 SEE COMMENT   Final    Comment: Other formed elements not mentioned in the report are not   present in the microscopic examination.        Assessment:       1. Anxiety    2. Essential hypertension    3. Mixed hyperlipidemia    4. Iron deficiency anemia secondary to inadequate dietary iron intake    5. Stage 3a chronic kidney disease    6. Asymptomatic postmenopausal state        Plan:       Anxiety  Stable, continue Xanax as needed.  Offered additional  medication such as an SSRI, but patient declined.      Essential hypertension  Stable with amlodipine 5 mg and benazepril 20 mg.  Low-sodium/low-fat diet,    Mixed hyperlipidemia  Gradually increasing.  Currently managed with diet.   Avoid fried fatty foods and decrease the amount of saturated fats.  A diet incorporated with fresh vegetables, fruits, legumes, nuts, whole grains, and fish is recommended.      Iron deficiency anemia secondary to inadequate dietary iron intake  Stable, continue current treatment.  -     ferrous gluconate (FERGON) 324 MG tablet; Take 1 tablet (324 mg total) by mouth daily with breakfast.  Dispense: 90 tablet; Refill: 0    Stage 3a chronic kidney disease  Stable, increase fluid intake, avoid NSAIDs, and keep BP controlled.    Asymptomatic postmenopausal state  -     DXA Bone Density Spine And Hip; Future; Expected date: 01/19/2022    This note was created using [employer] voice recognition software that occasionally misinterprets phrases or words.     Follow up in about 3 months (around 4/19/2022) for Anxiety.      1/19/2022 ELINA Johnston, AARONP

## 2022-01-19 NOTE — PATIENT INSTRUCTIONS
"Patient Education       Low Salt Diet   About this topic   Sodium is a type of mineral found in many foods. It may also be called "salt." Sodium helps balance fluids in your body. Too much sodium may be bad for your health. You may have to limit the amount of sodium in your food.  Salt is known as sodium chloride. It is measured in grams (g) or milligrams (mg). Salt or sodium in our diet comes from 3 main sources:  · Some sodium is naturally found in food.  · We may add salt to our food when we eat or cook.  · Processed foods give us most of the sodium in our diet.         What will the results be?   This diet may help lower your blood pressure. It may also help reduce extra water in your body. This may help kidney, heart, or liver problems.  What changes to diet are needed?   You need to know how much sodium is in the food you eat. Read food labels with care. Choose foods that have 5% or less sodium in one serving. Remember, if you eat more than one serving, you will be getting more sodium. It may take a while for your sense of taste to get used to food with less sodium. Be patient with yourself. You may be surprised at how well you will do.  Try to aim for a diet that has 2,300 mg (2.3 g) or less sodium in it each day. The Food & Drug Administration (FDA) has set up guidelines for food labels. These will help you make healthy choices. Look for these terms on food packages:  · Sodium-free: Less than 5 mg in each serving. These are safe to eat.  · Very low sodium: 35 mg of sodium or less in each serving. These are safe to eat.  · Low sodium: 140 mg of sodium or less in each serving. You need to eat these with care.  · Reduced sodium: At least 25% less sodium than is most often found in each serving. These foods can still be high in sodium.  · Light in sodium: 50% less sodium in each serving.  · Unsalted, no added salt, and without added salt: No salt is added during processing, but the food may still have sodium. " Read the food label and check the sodium content before eating.  What foods are good to eat?   You can control the amount of sodium in foods you make at home. Fresh foods that you cook are most often lower in sodium.  · Regular bread, unsalted crackers, dry cereal, cooked rice, pasta, quinoa, and corn tortillas.  · Fresh, frozen, low sodium, or salt-free canned vegetables. Limit vegetable juice or tomato juice to 1/2 cup (120 mL) each day.  · Fresh, frozen, canned, or dried fruit without salt added  · Nonfat or low-fat milk and yogurt, low-sodium cottage cheese, and other cheeses low in sodium.  · Fresh or frozen beef, veal, lamb, pork, poultry, fish, shellfish  · Low sodium canned meats, frozen dinners with less than 600 mg sodium  · Corn, safflower, sunflower, and soybean oils and unsalted nuts and seeds  · Egg and egg substitutes without added sodium  · Dried peas, beans, and low-sodium peanut butter  · All plain oils and low-sodium salad dressing  · Low-sodium broths, soups, soy sauce, condiments, and snack foods  · Pepper, herbs, spices, vinegar, lemon or lime juice  · Low-sodium carbonated drinks  What foods should be limited or avoided?   Foods that are prepackaged or canned are most often high in sodium. Foods to limit or avoid include:  · Salted breads, rolls, crackers, biscuits, cornbread  · Quick breads, self-rising flours, biscuit mixes, regular bread crumbs, instant hot cereals  · Commercially prepared rice, pasta, or stuffing mixes; potatoes and vegetable mixes  · Regular canned vegetables and juices, vegetables with sauce, and pickled vegetables  · Frozen vegetables with seasonings and sauces  · Processed fruits with salt or sodium  · Malted and chocolate milk and buttermilk  · Regular and processed cheese and spreads  · Smoked, cured, salted, or canned meat, fish, or poultry such as longoria, sausages, sardines, chipped beef, hot dogs, cold cuts, and frozen breaded meats  · Salted and canned peas,  beans, and olives  · Salted snack foods and nuts  · Oils mixed with high-sodium parts such as salad dressing  · Meat tenderizers, seasoning salt, and most flavored vinegars  · Ketchup, bouillon cubes, salt, sea salt, kosher salt, onion salt, garlic salt, and pink Himalayan salt  What can be done to prevent this health problem?   Check with your doctor before using salt substitutes. Also, check the labels when taking over-the-counter (OTC) drugs such as laxatives and antacids. These can have a high sodium content.  When do I need to call the doctor?   Call your doctor if you have questions about this diet. Talk to a dietitian. They can help you find hidden sources of sodium in the food you eat.  Helpful tips   · Use the nutrition facts labels as a guide to look for foods lower in sodium.  · Do not use salt when eating or cooking.  · Select fresh fruits and vegetables for snacks.  · If you are eating out, ask the  to cook your food without salt, or choose foods without sauces.  · Season your food with herbs and spices.  · Drain and rinse canned beans and vegetables that contain sodium.  · Eat foods with potassium. Potassium helps counter the effects of sodium. This may help lower your blood pressure. Foods high in potassium include: Potatoes, tomatoes, bananas, oranges, cantaloupe, beans, and greens.  Where can I learn more?   American Heart Association  https://www.heart.org/en/healthy-living/healthy-eating/eat-smart/sodium/how-to-reduce-sodium   American Heart Association  https://www.heart.org/en/healthy-living/healthy-eating/eat-smart/nutrition-basics/food-packaging-claims   NHS  https://www.nhs.uk/live-well/eat-well/tips-for-a-lower-salt-diet/   UpToDate  http://www.[employer]/contents/low-sodium-diet-beyond-the-basics   Last Reviewed Date   2021-10-11  Consumer Information Use and Disclaimer   This information is not specific medical advice and does not replace information you receive from your health care  "provider. This is only a brief summary of general information. It does NOT include all information about conditions, illnesses, injuries, tests, procedures, treatments, therapies, discharge instructions or life-style choices that may apply to you. You must talk with your health care provider for complete information about your health and treatment options. This information should not be used to decide whether or not to accept your health care providers advice, instructions or recommendations. Only your health care provider has the knowledge and training to provide advice that is right for you.  Copyright   Copyright © 2021 Up[employer] Inc. and its affiliates and/or licensors. All rights reserved.  Patient Education       Low Cholesterol, Saturated Fat, and Trans Fat Diet   About this topic   Cholesterol, saturated fat, and trans fat are in many foods. These may raise your blood cholesterol levels. If your cholesterol is too high, this can cause health problems in your heart, liver, kidneys, and even your eyes. The key to lowering your risk of heart problems is to lower your bad fat intake.  Saturated fats and trans fats are the bad fats. These fats clog your arteries and raise your bad cholesterol. Saturated fats and trans fats are solid fats at room temperature. Saturated fats are animal fats. Trans fats are manmade fats. They add flavor to a lot of packaged foods. Staying away from saturated and trans fats will help your heart.  When you do eat foods with fat, make sure they have the good fats. Monounsaturated and polyunsaturated fats are good fats. These fats help raise your good cholesterol and protect your heart.  General   How to Lower Fat and Cholesterol in Your Diet   · Read the labels of the foods you buy from the market to find out how much fat is present. Under 5% of total fat on a label means it is "low fat". Over 20% of total fat on a label means it is high fat.  · Eat high fiber foods, like soluble " fiber. This type of fiber helps lower cholesterol in the body. Choose oatmeal, fruits (like apples), beans, and nuts to get the most soluble fiber.  · Eat foods high in omega-3 fatty acids like preston seeds, walnuts, salmon, tuna, trout, herring, flaxseed, and soybeans. These foods help keep the heart healthy.  · Limit your bad fat and oil intake.  · Stay away from butter, stick margarine, shortening, lard, and palm and coconut oil. Pick plant-based spreads instead.  · Limit mayonnaise, salad dressings, gravies, and sauces, unless it is made from low-fat ingredients.  · Limit chocolate.  · Do not eat high-fat processed foods like hot dogs, longoria, sausage, ham and other luncheon meats high in fat, and some frozen foods. Pick fish, chicken, turkey, and lean meats instead.  · Eat more dried beans, lentils, and tofu to get your protein.  · Do not eat organ meats, like liver.  · Choose nonfat or low-fat milk, yogurt, and cheese.  · Use light or fat-free cream cheese and sour cream.  · Eat lots of fruits and vegetables.  · Pick whole grain breads, cereals, pastas, and rice.  · Do not eat snacks that are high in fats like granola, cookies, pies, pastries, doughnuts, and croissants.  · Stay away from deep fried foods.  Help When Cooking   · Remove the fat portion of meats and the skin from poultry before cooking.  · Bake, broil, grill, poach, or roast poultry, fish, and lean meats.  · Drain and throw away the fat that drains out of meat as you cook it.  · Try to add little or no fat to foods.  · Use olive or canola oil for cooking or baking.  · Steam your vegetables.  · Use herbs or no-oil marinades to flavor foods.         Who should use this diet?   This diet is for people who are at high risk of getting health problems like heart disease, high blood pressure, diabetes, and others. This diet is also good for all people to follow to keep your heart healthy.  What foods are good to eat?   Foods with good fats are:  · Canola,  peanut, and olive oil  · Safflower, soybean, and corn oil  · Walnuts, almonds, cashews, and peanuts  · Pumpkin and sunflower seeds  · Barto and tuna  · Tofu  · Soymilk  · Avocado  What foods should be limited or avoided?   Stay away from these types of foods that have saturated fats:  · Whole fat dairy products like cheese, ice cream, whole milk, and cream  · Palm and coconut oils  · High-fat meats like beef, lamb, poultry with the skin, longoria, and sausage  · Butter and lard  Stay away from these types of foods that may have trans fat:  · Cookies, cakes, candy, doughnuts, baked goods, muffins, pizza dough, and pie crusts that are packaged  · Fried foods  · Frozen dinners  · Chips and crackers  · Microwave popcorn  · Stick margarine and vegetable shortenings  Helpful tips   To help stay away from saturated fat:  · Pick lean cuts of meat  · Take the skin off chicken and turkey or pick skinless  · Pick low-fat cheese, milk, and ice cream  · Use liquid oils when cooking and baking, such as olive oil and canola oil  To help stay away from trans fat:  · Look at your labels. Choose foods with 0% trans fat. Read the ingredient list. Avoid foods with partially hydrogenated oil in the ingredient list. This means there is trans fat in the product.  Where can I learn more?   American Heart Association   http://www.heart.org/HEARTORG/Conditions/Cholesterol/PreventionTreatmentofHighCholesterol/Know-Your-Fats_[facility]_305628_Article.jsp   Last Reviewed Date   2021-10-05  Consumer Information Use and Disclaimer   This information is not specific medical advice and does not replace information you receive from your health care provider. This is only a brief summary of general information. It does NOT include all information about conditions, illnesses, injuries, tests, procedures, treatments, therapies, discharge instructions or life-style choices that may apply to you. You must talk with your health care provider for complete  information about your health and treatment options. This information should not be used to decide whether or not to accept your health care providers advice, instructions or recommendations. Only your health care provider has the knowledge and training to provide advice that is right for you.   Copyright   Copyright © 2021 [employer] and its affiliates and/or licensors. All rights reserved.

## 2022-02-24 DIAGNOSIS — M15.9 PRIMARY OSTEOARTHRITIS INVOLVING MULTIPLE JOINTS: ICD-10-CM

## 2022-02-24 DIAGNOSIS — F41.9 ANXIETY: ICD-10-CM

## 2022-02-24 RX ORDER — DICLOFENAC SODIUM 10 MG/G
GEL TOPICAL 2 TIMES DAILY
Qty: 450 G | Refills: 3 | Status: SHIPPED | OUTPATIENT
Start: 2022-02-24 | End: 2022-04-19 | Stop reason: SDUPTHER

## 2022-02-24 RX ORDER — ALPRAZOLAM 0.25 MG/1
TABLET ORAL
Qty: 60 TABLET | Refills: 0 | Status: SHIPPED | OUTPATIENT
Start: 2022-02-24 | End: 2022-03-24

## 2022-04-19 ENCOUNTER — OFFICE VISIT (OUTPATIENT)
Dept: FAMILY MEDICINE | Facility: CLINIC | Age: 82
End: 2022-04-19
Payer: MEDICARE

## 2022-04-19 VITALS
HEIGHT: 63 IN | TEMPERATURE: 98 F | HEART RATE: 88 BPM | BODY MASS INDEX: 23.41 KG/M2 | WEIGHT: 132.13 LBS | DIASTOLIC BLOOD PRESSURE: 70 MMHG | OXYGEN SATURATION: 96 % | SYSTOLIC BLOOD PRESSURE: 142 MMHG

## 2022-04-19 DIAGNOSIS — M15.9 PRIMARY OSTEOARTHRITIS INVOLVING MULTIPLE JOINTS: ICD-10-CM

## 2022-04-19 DIAGNOSIS — I10 ESSENTIAL HYPERTENSION: ICD-10-CM

## 2022-04-19 DIAGNOSIS — M25.551 RIGHT HIP PAIN: ICD-10-CM

## 2022-04-19 DIAGNOSIS — M19.072 PRIMARY OSTEOARTHRITIS OF BOTH FEET: ICD-10-CM

## 2022-04-19 DIAGNOSIS — D50.8 IRON DEFICIENCY ANEMIA SECONDARY TO INADEQUATE DIETARY IRON INTAKE: ICD-10-CM

## 2022-04-19 DIAGNOSIS — F41.9 ANXIETY: Primary | ICD-10-CM

## 2022-04-19 DIAGNOSIS — E78.2 MIXED HYPERLIPIDEMIA: ICD-10-CM

## 2022-04-19 DIAGNOSIS — N18.31 STAGE 3A CHRONIC KIDNEY DISEASE: ICD-10-CM

## 2022-04-19 DIAGNOSIS — M19.071 PRIMARY OSTEOARTHRITIS OF BOTH FEET: ICD-10-CM

## 2022-04-19 DIAGNOSIS — M21.619 BUNION OF GREAT TOE: ICD-10-CM

## 2022-04-19 PROCEDURE — 99214 PR OFFICE/OUTPT VISIT, EST, LEVL IV, 30-39 MIN: ICD-10-PCS | Mod: S$PBB,,, | Performed by: NURSE PRACTITIONER

## 2022-04-19 PROCEDURE — 99215 OFFICE O/P EST HI 40 MIN: CPT | Performed by: NURSE PRACTITIONER

## 2022-04-19 PROCEDURE — 99214 OFFICE O/P EST MOD 30 MIN: CPT | Mod: S$PBB,,, | Performed by: NURSE PRACTITIONER

## 2022-04-19 RX ORDER — ALPRAZOLAM 0.25 MG/1
TABLET ORAL
Qty: 60 TABLET | Refills: 0 | Status: SHIPPED | OUTPATIENT
Start: 2022-04-19 | End: 2022-05-31

## 2022-04-19 RX ORDER — DICLOFENAC SODIUM 10 MG/G
GEL TOPICAL 2 TIMES DAILY
Qty: 450 G | Refills: 3 | Status: SHIPPED | OUTPATIENT
Start: 2022-04-19 | End: 2022-11-07 | Stop reason: SDUPTHER

## 2022-04-19 NOTE — PROGRESS NOTES
SUBJECTIVE:      Patient ID: Martha Alonso is a 81 y.o. female.    Chief Complaint: Anxiety (3 month f/u, med refill)    81-year-old female presents to the clinic for anxiety follow-up.  Patient has been doing well, but does reports increased anxiety.  Her daughter was recently diagnosed with cancer and is starting chemotherapy.  Patient uses Xanax as need for anxiety.  Offered additional medication to help with anxiety, but patient states it is manageable.  Her mood is stable today.  Denies SI.    Today she complaints of right hip pain, bilateral feet pain, and right posterior shoulder pain. She has a history of arthritis.  Denies injury or trauma. She reports increased pain to feet.  She wears sandals and flip-flops frequently.  She is unable to wear running shoes due to bunions and toe crossing. She has been treating her symptoms with Advil with no relief.  She has take Tylenol Arthritis and Voltaren previously which helped.  Previously prescribed Celebrex, but did not take the medication because the medication has dizziness as a side effect.     Anxiety  Presents for follow-up visit. Symptoms include excessive worry, irritability, nervous/anxious behavior and restlessness. Patient reports no chest pain, confusion, dizziness, hyperventilation, nausea, palpitations or shortness of breath. Symptoms occur occasionally. The severity of symptoms is mild. The quality of sleep is good. Nighttime awakenings: occasional.     Compliance with medications is %. Treatment side effects: None.       History reviewed. No pertinent family history.   Social History     Socioeconomic History    Marital status:    Occupational History    Occupation: retired   Tobacco Use    Smoking status: Never Smoker    Smokeless tobacco: Never Used   Substance and Sexual Activity    Drug use: Never    Sexual activity: Not Currently     Current Outpatient Medications   Medication Sig Dispense Refill    amLODIPine (NORVASC)  5 MG tablet TAKE 1 TABLET(5 MG) BY MOUTH EVERY DAY 90 tablet 1    benazepriL (LOTENSIN) 20 MG tablet TAKE 1 TABLET(20 MG) BY MOUTH EVERY DAY 90 tablet 1    famotidine (PEPCID) 20 MG tablet Take 1 tablet (20 mg total) by mouth 2 (two) times daily. 180 tablet 1    ferrous gluconate (FERGON) 324 MG tablet Take 1 tablet (324 mg total) by mouth daily with breakfast. 90 tablet 0    ondansetron (ZOFRAN-ODT) 4 MG TbDL Take 1 tablet (4 mg total) by mouth every 12 (twelve) hours as needed (nausea). 21 tablet 0    ALPRAZolam (XANAX) 0.25 MG tablet TAKE 1 TABLET(0.25 MG) BY MOUTH TWICE DAILY AS NEEDED FOR ANXIETY 60 tablet 0    diclofenac sodium (VOLTAREN) 1 % Gel Apply topically 2 (two) times daily. 450 g 3    mesalamine (CANASA) 1000 MG Supp Canasa 1,000 mg rectal suppository      phenazopyridine (PYRIDIUM) 95 MG tablet Take 95 mg by mouth 3 (three) times daily as needed for Pain.      VENTOLIN HFA 90 mcg/actuation inhaler Inhale 2 puffs into the lungs every 4 to 6 hours as needed.       No current facility-administered medications for this visit.     Review of patient's allergies indicates:  No Known Allergies   Past Medical History:   Diagnosis Date    Hypertension     Rheumatoid arthritis      History reviewed. No pertinent surgical history.    Review of Systems   Constitutional: Positive for irritability. Negative for activity change, appetite change, chills, diaphoresis, fatigue, fever, malaise/fatigue and unexpected weight change.   HENT: Negative for congestion, ear pain, sinus pressure, sore throat, trouble swallowing and voice change.    Eyes: Negative for blurred vision, pain, discharge and visual disturbance.   Respiratory: Negative for cough, chest tightness, shortness of breath and wheezing.    Cardiovascular: Negative for chest pain, palpitations, orthopnea and PND.   Gastrointestinal: Negative for abdominal pain, constipation, diarrhea, nausea and vomiting.   Genitourinary: Negative for difficulty  "urinating, flank pain, frequency and urgency.   Musculoskeletal: Positive for arthralgias. Negative for back pain, joint swelling and neck pain.        Bilateral foot pain, right hip pain, and right posterior shoulder pain   Skin: Negative for color change and rash.   Neurological: Negative for dizziness, seizures, syncope, weakness, numbness and headaches.   Hematological: Negative for adenopathy.   Psychiatric/Behavioral: Negative for confusion, dysphoric mood and sleep disturbance. The patient is nervous/anxious.       OBJECTIVE:      Vitals:    04/19/22 1426   BP: (!) 142/70   BP Location: Left arm   Patient Position: Sitting   BP Method: Medium (Manual)   Pulse: 88   Temp: 97.7 °F (36.5 °C)   TempSrc: Oral   SpO2: 96%   Weight: 59.9 kg (132 lb 1.6 oz)   Height: 5' 3" (1.6 m)     Physical Exam  Vitals and nursing note reviewed.   Constitutional:       General: She is awake. She is not in acute distress.     Appearance: Normal appearance. She is normal weight. She is not ill-appearing, toxic-appearing or diaphoretic.   HENT:      Head: Normocephalic and atraumatic.      Nose: Nose normal.   Eyes:      General: Lids are normal. Gaze aligned appropriately.      Conjunctiva/sclera: Conjunctivae normal.      Right eye: Right conjunctiva is not injected.      Left eye: Left conjunctiva is not injected.      Pupils: Pupils are equal, round, and reactive to light.   Cardiovascular:      Rate and Rhythm: Normal rate and regular rhythm.      Pulses:           Dorsalis pedis pulses are 2+ on the right side and 2+ on the left side.        Posterior tibial pulses are 1+ on the right side and 1+ on the left side.      Heart sounds: Normal heart sounds, S1 normal and S2 normal. No murmur heard.    No friction rub. No gallop.   Pulmonary:      Effort: Pulmonary effort is normal. No respiratory distress.      Breath sounds: Normal breath sounds. No stridor. No decreased breath sounds, wheezing, rhonchi or rales.   Chest:      " Chest wall: No tenderness.   Musculoskeletal:      Right shoulder: Tenderness present. No deformity or effusion. Normal range of motion. Normal strength.        Arms:       Cervical back: Neck supple.      Right hip: Tenderness and bony tenderness present.      Right lower leg: No edema.      Left lower leg: No edema.      Right foot: Normal range of motion. Bunion present.      Left foot: Normal range of motion. Bunion present.      Comments: Right lateral hip tenderness to the greater trochanter.   Feet:      Right foot:      Skin integrity: Dry skin present.      Toenail Condition: Right toenails are abnormally thick.      Left foot:      Skin integrity: Dry skin present.      Toenail Condition: Left toenails are abnormally thick.      Comments: Crossover toe bilateral.  Lymphadenopathy:      Cervical: No cervical adenopathy.   Skin:     General: Skin is warm and dry.      Capillary Refill: Capillary refill takes less than 2 seconds.      Findings: No erythema or rash.   Neurological:      Mental Status: She is alert and oriented to person, place, and time. Mental status is at baseline.   Psychiatric:         Attention and Perception: Attention normal.         Mood and Affect: Mood is anxious.         Speech: Speech normal.         Behavior: Behavior normal. Behavior is cooperative.         Thought Content: Thought content normal.         Judgment: Judgment normal.        Assessment:       1. Anxiety    2. Primary osteoarthritis of both feet    3. Primary osteoarthritis involving multiple joints    4. Bunion of great toe    5. Right hip pain    6. Essential hypertension    7. Mixed hyperlipidemia    8. Iron deficiency anemia secondary to inadequate dietary iron intake    9. Stage 3a chronic kidney disease        Plan:       Anxiety  Stable, continue current treatment.   -     ALPRAZolam (XANAX) 0.25 MG tablet; TAKE 1 TABLET(0.25 MG) BY MOUTH TWICE DAILY AS NEEDED FOR ANXIETY  Dispense: 60 tablet; Refill:  0    Primary osteoarthritis of both feet  Suspect the discomfort is related to arthritis and poor footwear.  She wears sandals mostly with no cushion or support due to bunions on her feet.  She does have some toe crossing and thickened toe nails so will have patient evaluated by podiatry.  May need a orthopedic type shoe.  -     Ambulatory referral/consult to Podiatry; Future; Expected date: 04/26/2022    Primary osteoarthritis involving multiple joints  -     diclofenac sodium (VOLTAREN) 1 % Gel; Apply topically 2 (two) times daily.  Dispense: 450 g; Refill: 3    Bunion of great toe  -     Ambulatory referral/consult to Podiatry; Future; Expected date: 04/26/2022    Right hip pain  Suspect arthritis vs greater trochanteric bursitis.  Tylenol arthritis prn pain or Celebrex.  If no improvement will send to ortho, may need an injection.     Essential hypertension  -     Comprehensive Metabolic Panel; Future; Expected date: 10/19/2022  -     Lipid Panel; Future; Expected date: 10/19/2022    Mixed hyperlipidemia  -     Comprehensive Metabolic Panel; Future; Expected date: 10/19/2022  -     Lipid Panel; Future; Expected date: 10/19/2022    Iron deficiency anemia secondary to inadequate dietary iron intake  -     CBC Auto Differential; Future; Expected date: 10/19/2022  -     Comprehensive Metabolic Panel; Future; Expected date: 10/19/2022  -     Ferritin; Future; Expected date: 10/19/2022  -     Iron and TIBC; Future; Expected date: 10/19/2022    Stage 3a chronic kidney disease  -     CBC Auto Differential; Future; Expected date: 10/19/2022  -     Comprehensive Metabolic Panel; Future; Expected date: 10/19/2022  -     Ferritin; Future; Expected date: 10/19/2022  -     Iron and TIBC; Future; Expected date: 10/19/2022    This note was created using [employer] voice recognition software that occasionally misinterprets phrases or words.     Follow up in about 3 months (around 7/19/2022) for Anxiety, HTN, HLD, Anemia.       4/19/2022 Pedro Pablo Coronado, ELINA, FNP

## 2022-05-25 ENCOUNTER — OFFICE VISIT (OUTPATIENT)
Dept: FAMILY MEDICINE | Facility: CLINIC | Age: 82
End: 2022-05-25
Payer: MEDICARE

## 2022-05-25 VITALS
SYSTOLIC BLOOD PRESSURE: 122 MMHG | DIASTOLIC BLOOD PRESSURE: 70 MMHG | WEIGHT: 129.63 LBS | HEIGHT: 63 IN | OXYGEN SATURATION: 95 % | BODY MASS INDEX: 22.97 KG/M2 | RESPIRATION RATE: 18 BRPM | TEMPERATURE: 98 F | HEART RATE: 93 BPM

## 2022-05-25 DIAGNOSIS — R05.9 COUGH: ICD-10-CM

## 2022-05-25 DIAGNOSIS — U07.1 COVID-19 VIRUS DETECTED: ICD-10-CM

## 2022-05-25 DIAGNOSIS — U07.1 COVID-19 VIRUS INFECTION: Primary | ICD-10-CM

## 2022-05-25 LAB
CTP QC/QA: YES
CTP QC/QA: YES
FLUAV AG NPH QL: NEGATIVE
FLUBV AG NPH QL: NEGATIVE
SARS-COV-2 RDRP RESP QL NAA+PROBE: POSITIVE

## 2022-05-25 PROCEDURE — 99214 OFFICE O/P EST MOD 30 MIN: CPT | Mod: S$PBB,CR,, | Performed by: NURSE PRACTITIONER

## 2022-05-25 PROCEDURE — 87804 INFLUENZA ASSAY W/OPTIC: CPT | Mod: 59,PBBFAC | Performed by: NURSE PRACTITIONER

## 2022-05-25 PROCEDURE — U0002 COVID-19 LAB TEST NON-CDC: HCPCS | Mod: PBBFAC | Performed by: NURSE PRACTITIONER

## 2022-05-25 PROCEDURE — 99215 OFFICE O/P EST HI 40 MIN: CPT | Performed by: NURSE PRACTITIONER

## 2022-05-25 PROCEDURE — 99214 PR OFFICE/OUTPT VISIT, EST, LEVL IV, 30-39 MIN: ICD-10-PCS | Mod: S$PBB,CR,, | Performed by: NURSE PRACTITIONER

## 2022-05-25 NOTE — PROGRESS NOTES
SUBJECTIVE:      Patient ID: Martha Alonso is a 81 y.o. female.    Chief Complaint: Cough, Nasal Congestion, and covid exposure    81-year-old female presents to the clinic with complaints cough, nasal congestion,sore throat, and postnasal drip.  She lives with her daughter and granddaughter who tested positive for COVID-19. Symptoms started Monday.  Denies fever and SOB.    Cough  This is a new problem. The current episode started in the past 7 days. The problem has been gradually worsening. The problem occurs every few hours. The cough is productive of sputum. Associated symptoms include nasal congestion, postnasal drip and a sore throat. Pertinent negatives include no chest pain, chills, ear congestion, ear pain, fever, headaches, heartburn, hemoptysis, myalgias, rash, rhinorrhea, shortness of breath, sweats, weight loss or wheezing. Nothing aggravates the symptoms. There is no history of asthma, bronchiectasis, bronchitis, COPD, emphysema, environmental allergies or pneumonia.       History reviewed. No pertinent family history.   Social History     Socioeconomic History    Marital status:    Occupational History    Occupation: retired   Tobacco Use    Smoking status: Never Smoker    Smokeless tobacco: Never Used   Substance and Sexual Activity    Drug use: Never    Sexual activity: Not Currently     Current Outpatient Medications   Medication Sig Dispense Refill    ALPRAZolam (XANAX) 0.25 MG tablet TAKE 1 TABLET(0.25 MG) BY MOUTH TWICE DAILY AS NEEDED FOR ANXIETY 60 tablet 0    amLODIPine (NORVASC) 5 MG tablet TAKE 1 TABLET(5 MG) BY MOUTH EVERY DAY 90 tablet 1    benazepriL (LOTENSIN) 20 MG tablet TAKE 1 TABLET(20 MG) BY MOUTH EVERY DAY 90 tablet 1    diclofenac sodium (VOLTAREN) 1 % Gel Apply topically 2 (two) times daily. 450 g 3    famotidine (PEPCID) 20 MG tablet Take 1 tablet (20 mg total) by mouth 2 (two) times daily. 180 tablet 1    ferrous gluconate (FERGON) 324 MG tablet Take 1  tablet (324 mg total) by mouth daily with breakfast. 90 tablet 0    mesalamine (CANASA) 1000 MG Supp Canasa 1,000 mg rectal suppository      ondansetron (ZOFRAN-ODT) 4 MG TbDL Take 1 tablet (4 mg total) by mouth every 12 (twelve) hours as needed (nausea). 21 tablet 0    phenazopyridine (PYRIDIUM) 95 MG tablet Take 95 mg by mouth 3 (three) times daily as needed for Pain.      VENTOLIN HFA 90 mcg/actuation inhaler Inhale 2 puffs into the lungs every 4 to 6 hours as needed.      molnupiravir 200 mg capsule (EUA) Take 4 capsules (800 mg total) by mouth every 12 (twelve) hours. for 5 days 40 capsule 0     No current facility-administered medications for this visit.     Review of patient's allergies indicates:  No Known Allergies   Past Medical History:   Diagnosis Date    Hypertension     Rheumatoid arthritis      History reviewed. No pertinent surgical history.    Review of Systems   Constitutional: Negative for activity change, appetite change, chills, diaphoresis, fatigue, fever, unexpected weight change and weight loss.   HENT: Positive for congestion, postnasal drip and sore throat. Negative for ear pain, rhinorrhea, sinus pressure, trouble swallowing and voice change.    Eyes: Negative for pain, discharge and visual disturbance.   Respiratory: Positive for cough. Negative for hemoptysis, chest tightness, shortness of breath and wheezing.    Cardiovascular: Negative for chest pain and palpitations.   Gastrointestinal: Negative for abdominal pain, constipation, diarrhea, heartburn, nausea and vomiting.   Genitourinary: Negative for difficulty urinating, flank pain, frequency and urgency.   Musculoskeletal: Negative for back pain, joint swelling and myalgias.   Skin: Negative for color change and rash.   Allergic/Immunologic: Negative for environmental allergies.   Neurological: Negative for dizziness, seizures, syncope, weakness, numbness and headaches.   Hematological: Negative for adenopathy.  "  Psychiatric/Behavioral: Negative for dysphoric mood and sleep disturbance. The patient is not nervous/anxious.       OBJECTIVE:      Vitals:    05/25/22 1638   BP: 122/70   BP Location: Left arm   Patient Position: Sitting   BP Method: Medium (Manual)   Pulse: 93   Resp: 18   Temp: 98.1 °F (36.7 °C)   SpO2: 95%   Weight: 58.8 kg (129 lb 9.6 oz)   Height: 5' 3" (1.6 m)     Physical Exam  Vitals and nursing note reviewed.   Constitutional:       General: She is awake. She is not in acute distress.     Appearance: Normal appearance. She is well-developed and normal weight. She is ill-appearing. She is not toxic-appearing or diaphoretic.   HENT:      Head: Normocephalic and atraumatic.      Nose: Nose normal.   Eyes:      General: Lids are normal. Gaze aligned appropriately.      Conjunctiva/sclera: Conjunctivae normal.      Right eye: Right conjunctiva is not injected.      Left eye: Left conjunctiva is not injected.      Pupils: Pupils are equal, round, and reactive to light.   Cardiovascular:      Rate and Rhythm: Normal rate and regular rhythm.      Pulses: Normal pulses.      Heart sounds: Normal heart sounds, S1 normal and S2 normal. No murmur heard.    No friction rub. No gallop.   Pulmonary:      Effort: Pulmonary effort is normal. No respiratory distress.      Breath sounds: Normal breath sounds. No stridor. No decreased breath sounds, wheezing, rhonchi or rales.   Chest:      Chest wall: No tenderness.   Musculoskeletal:      Cervical back: Neck supple.      Right lower leg: No edema.      Left lower leg: No edema.   Lymphadenopathy:      Cervical: No cervical adenopathy.   Skin:     General: Skin is warm and dry.      Capillary Refill: Capillary refill takes less than 2 seconds.      Coloration: Skin is not jaundiced.      Findings: No erythema or rash.   Neurological:      Mental Status: She is alert and oriented to person, place, and time. Mental status is at baseline.   Psychiatric:         Attention and " Perception: Attention normal.         Mood and Affect: Mood normal.         Speech: Speech normal.         Behavior: Behavior normal. Behavior is cooperative.         Thought Content: Thought content normal.         Judgment: Judgment normal.         Office Visit on 05/25/2022   Component Date Value Ref Range Status    POC Rapid COVID 05/25/2022 Positive (A) Negative Final     Acceptable 05/25/2022 Yes   Final    Rapid Influenza A Ag 05/25/2022 Negative  Negative Final    Rapid Influenza B Ag 05/25/2022 Negative  Negative Final     Acceptable 05/25/2022 Yes   Final     Assessment:       1. COVID-19 virus infection    2. Cough        Plan:       COVID-19 virus infection  COVID-19 risk score for.  Symptomatic care discussed.  VSS and O2 sats stable.  Patient to start Molnupiravir.  Patient could not do Paxlovid because she cannot stop her Xanax for 5 days.  Side effects of new medication discussed with patient; understanding voiced.  Patient informed to remain in quarantine for 5 days.  If symptoms are resolving after 5 days, she can leave the house.  Continue to wear a mask around others for 5 additional days.  -     molnupiravir 200 mg capsule (EUA); Take 4 capsules (800 mg total) by mouth every 12 (twelve) hours. for 5 days  Dispense: 40 capsule; Refill: 0    Cough  Mucinex DM or Robitussin.    -     POCT COVID-19 Rapid Screening  -     POCT Influenza A/B    This note was created using [employer] voice recognition software that occasionally misinterprets phrases or words.     Follow up if symptoms worsen or fail to improve.      5/25/2022 ELINA Johnston, FNP

## 2022-07-12 ENCOUNTER — OFFICE VISIT (OUTPATIENT)
Dept: PODIATRY | Facility: CLINIC | Age: 82
End: 2022-07-12
Payer: MEDICARE

## 2022-07-12 VITALS — HEIGHT: 63 IN | WEIGHT: 128 LBS | BODY MASS INDEX: 22.68 KG/M2 | HEART RATE: 87 BPM | OXYGEN SATURATION: 95 %

## 2022-07-12 DIAGNOSIS — M20.11 VALGUS DEFORMITY OF BOTH GREAT TOES: Primary | ICD-10-CM

## 2022-07-12 DIAGNOSIS — M19.079 ARTHRITIS OF FOOT: ICD-10-CM

## 2022-07-12 DIAGNOSIS — M20.42 HAMMER TOES OF BOTH FEET: ICD-10-CM

## 2022-07-12 DIAGNOSIS — M20.12 VALGUS DEFORMITY OF BOTH GREAT TOES: Primary | ICD-10-CM

## 2022-07-12 DIAGNOSIS — M79.671 BILATERAL FOOT PAIN: ICD-10-CM

## 2022-07-12 DIAGNOSIS — M20.41 HAMMER TOES OF BOTH FEET: ICD-10-CM

## 2022-07-12 DIAGNOSIS — M79.672 BILATERAL FOOT PAIN: ICD-10-CM

## 2022-07-12 PROCEDURE — 99202 OFFICE O/P NEW SF 15 MIN: CPT | Mod: S$GLB,,, | Performed by: PODIATRIST

## 2022-07-12 PROCEDURE — 99202 PR OFFICE/OUTPT VISIT, NEW, LEVL II, 15-29 MIN: ICD-10-PCS | Mod: S$GLB,,, | Performed by: PODIATRIST

## 2022-07-12 RX ORDER — IBUPROFEN 200 MG
200 TABLET ORAL EVERY 6 HOURS PRN
COMMUNITY

## 2022-07-12 NOTE — PROGRESS NOTES
"  [address] [facility] [address] [place] [address]  Phone: [phone]   [phone]    Patient's PCP:Pedro Pablo Coronado NP  Referring Provider: Pedro Pablo Valentin*    Subjective:      Chief Complaint:: Foot Pain (Osteoarthritis  in both toes . Patient complains of  Right big hammer toe , and Left foot 3rd hammer toe )    LISA Alonso is a 81 y.o. female who presents today with a complaint of Osteoarthritis in both toes .She also states pain with hammer toes . She had hd this symptoms for years.  and reports no trama   Current symptoms include pain in toes.   Aggravating factors are walking and pain while wearing shoes. Treatment to date have included none.         Vitals:    07/12/22 1008   Pulse: 87   SpO2: 95%   Weight: 58.1 kg (128 lb)   Height: 5' 3" (1.6 m)   PainSc:   2      Shoe Size: 8    History reviewed. No pertinent surgical history.  Past Medical History:   Diagnosis Date    Hypertension     Rheumatoid arthritis      History reviewed. No pertinent family history.     Social History:   Marital Status:   Alcohol History:  has no history on file for alcohol use.  Tobacco History:  reports that she has never smoked. She has never used smokeless tobacco.  Drug History:  reports no history of drug use.    Review of patient's allergies indicates:  No Known Allergies    Current Outpatient Medications   Medication Sig Dispense Refill    ALPRAZolam (XANAX) 0.25 MG tablet TAKE 1 TABLET(0.25 MG) BY MOUTH TWICE DAILY AS NEEDED FOR ANXIETY 60 tablet 0    amLODIPine (NORVASC) 5 MG tablet TAKE 1 TABLET(5 MG) BY MOUTH EVERY DAY 90 tablet 1    benazepriL (LOTENSIN) 20 MG tablet TAKE 1 TABLET(20 MG) BY MOUTH EVERY DAY 90 tablet 1    diclofenac sodium (VOLTAREN) 1 % Gel Apply topically 2 (two) times daily. 450 g 3    ferrous gluconate (FERGON) 324 MG tablet Take 1 tablet (324 mg total) by mouth daily with breakfast. 90 tablet 0    ibuprofen (ADVIL) 200 MG tablet Take 200 mg by mouth every " 6 (six) hours as needed for Pain. As needed for hip pain      ondansetron (ZOFRAN-ODT) 4 MG TbDL Take 1 tablet (4 mg total) by mouth every 12 (twelve) hours as needed (nausea). 21 tablet 0    famotidine (PEPCID) 20 MG tablet Take 1 tablet (20 mg total) by mouth 2 (two) times daily. 180 tablet 1    mesalamine (CANASA) 1000 MG Supp Canasa 1,000 mg rectal suppository      phenazopyridine (PYRIDIUM) 95 MG tablet Take 95 mg by mouth 3 (three) times daily as needed for Pain.      VENTOLIN HFA 90 mcg/actuation inhaler Inhale 2 puffs into the lungs every 4 to 6 hours as needed.       No current facility-administered medications for this visit.       Review of Systems   Constitutional: Negative for chills, fatigue, fever and unexpected weight change.   HENT: Negative for hearing loss and trouble swallowing.    Eyes: Negative for photophobia and visual disturbance.   Respiratory: Negative for cough, shortness of breath and wheezing.    Cardiovascular: Negative for chest pain, palpitations and leg swelling.   Gastrointestinal: Negative for abdominal pain and nausea.   Genitourinary: Negative for dysuria and frequency.   Musculoskeletal: Positive for arthralgias and joint swelling. Negative for back pain, gait problem and myalgias.   Skin: Positive for color change. Negative for rash and wound.   Neurological: Negative for tremors, seizures, weakness, numbness and headaches.   Hematological: Does not bruise/bleed easily.         Objective:        Physical Exam:   Foot Exam    General  General Appearance: appears stated age and healthy   Orientation: alert and oriented to person, place, and time   Affect: appropriate   Gait: antalgic       Right Foot/Ankle     Inspection and Palpation  Ecchymosis: none  Tenderness: great toe metatarsophalangeal joint and lesser metatarsophalangeal joints (Pain by hammertoes)  Swelling: great toe metatarsophalangeal joint   Arch: pes planus  Hammertoes: second toe, third toe, fourth toe and  fifth toe  Hallux valgus: yes  Hallux limitus: yes  Skin Exam: skin intact;   Neurovascular  Dorsalis pedis: 2+  Posterior tibial: 2+  Capillary Refill: 2+  Saphenous nerve sensation: normal  Tibial nerve sensation: normal  Superficial peroneal nerve sensation: normal  Deep peroneal nerve sensation: normal  Sural nerve sensation: normal    Muscle Strength  Ankle dorsiflexion: 5  Ankle plantar flexion: 5  Ankle inversion: 5  Ankle eversion: 5  Great toe extension: 5  Great toe flexion: 5    Range of Motion    Normal right ankle ROM  Passive  1st MTP extension: 30    Active  1st MTP extension: 25      Left Foot/Ankle      Inspection and Palpation  Ecchymosis: none  Tenderness: great toe metatarsophalangeal joint and lesser metatarsophalangeal joints (Pain by hammertoes)  Swelling: great toe metatarsophalangeal joint   Arch: pes planus  Hammertoes: second toe, third toe, fourth toe and fifth toe  Hallux valgus: yes  Hallux limitus: yes  Skin Exam: skin intact;   Neurovascular  Dorsalis pedis: 2+  Posterior tibial: 2+  Capillary refill: 2+  Saphenous nerve sensation: normal  Tibial nerve sensation: normal  Superficial peroneal nerve sensation: normal  Deep peroneal nerve sensation: normal  Sural nerve sensation: normal    Muscle Strength  Ankle dorsiflexion: 5  Ankle plantar flexion: 5  Ankle inversion: 5  Ankle eversion: 5  Great toe extension: 5  Great toe flexion: 5    Range of Motion    Normal left ankle ROM  Passive  1st MTP extension: 30    Active  1st MTP extension: 25          Physical Exam  Cardiovascular:      Pulses:           Dorsalis pedis pulses are 2+ on the right side and 2+ on the left side.        Posterior tibial pulses are 2+ on the right side and 2+ on the left side.   Musculoskeletal:      Right foot: Bunion present.      Left foot: Bunion present.        Feet:                Right Ankle/Foot Exam     Swelling   The patient is swollen on the great toe metatarsophalangeal joint.    Tenderness   The  patient is tender to palpation of the great toe metatarsophalangeal joint and lesser metatarsophalangeal joints.    Range of Motion   The patient has normal right ankle ROM.    Left Ankle/Foot Exam     Swelling   The patient is swollen on the great toe metatarsophalangeal joint.    Tenderness   The patient is tender to palpation of the great toe metatarsophalangeal joint and lesser metatarsophalangeal joints.    Range of Motion   The patient has normal left ankle ROM.     Muscle Strength   The patient has normal left ankle strength.      Muscle Strength   Right Lower Extremity   Ankle Dorsiflexion:  5   Plantar flexion:  5/5  Left Lower Extremity   Ankle Dorsiflexion:  5   Plantar flexion:  5/5     Vascular Exam     Right Pulses  Dorsalis Pedis:      2+  Posterior Tibial:      2+        Left Pulses  Dorsalis Pedis:      2+  Posterior Tibial:      2+             Imaging:            Assessment:       1. Valgus deformity of both great toes    2. Bilateral foot pain    3. Hammer toes of both feet    4. Arthritis of foot      Plan:   Valgus deformity of both great toes    Bilateral foot pain    Hammer toes of both feet    Arthritis of foot    Evaluated patient had discussion with her and her daughter that she has bunions and hammertoe deformities that are causing her pain that a progressively gotten worse over time.  I am not recommending any surgery because of her age with potential risk with bone quality is circulation health status.  I recommend they get fitted for shoes and varsity sports get appropriate fit in measurements then go to extra wide shoes with extra-depth SAS rockport and running shoes.  Use topical anti-inflammatory gels.  I am not recommending any surgical procedures return as needed  Follow up if symptoms worsen or fail to improve.    Procedures          Counseling:     I provided patient education verbally regarding:   Patient diagnosis, treatment options, as well as alternatives, risks, and  benefits.     I provided patient education regarding: Bunion deformity and causes. I discussed conservative treatment with shoe modification, pads, treating symptoms with OTC NSAIDs, pads between toes, inserts and pads over the bunion. I explained that conservative treatment is non-curative but may help with pain and slow down the progression of the deformity.     I discussed hammertoe deformity and conservative treatment of deep, wider shoes, padding of the sore areas, OTC NSAID, skin softners, palliative care.  I discussed surgical procedures of fusion of the PIPJ of the toes with wires or implants, the follow up and the possible complications.  This note was created using Dragon voice recognition software that occasionally misinterpreted phrases or words.

## 2022-07-12 NOTE — PATIENT INSTRUCTIONS
Nail Fungal Infection  A nail fungal infection changes the way fingernails and toenails look. They may thicken, discolor, change shape, or split. This condition is hard to treat because nails grow slowly and have limited blood supply. The infection often comes back after treatment.  There are 2 types of medicines used to treat this condition:  Topical anti-fungal medicines. These are applied to the surface of the skin and nail area. These medicines are not very effective because they cant get deep into the nail.  Oral antifungal medicines. These medicines work better because they go into the nail from the inside out. But the infection may still come back. It may take 9 to 12 months for your nail to look normal again. This means you are cured. You can repeat treatment if needed. Most people take these medicines without any problems. It is rare to stop therapy because of side effects. But your healthcare provider may give you some monitoring tests. Talk about possible side effects with your provider before starting treatment.  If medicines fail, the nail can be removed surgically or chemically. These methods physically remove the fungus from the body. This helps medical treatment be more effective.  Home care  Use medicines exactly as directed for as long as directed. Treating a fungal infection can take longer than other kinds of infections.  Smoking is a risk factor for fungal infection. This is one more reason to quit.  Wear absorbent socks, and shoes that let your feet breathe. Sweaty feet increase your risk of fungal infection. They also make an existing infection harder to treat.  Use footwear when in damp public places like swimming pools, gyms, and shower rooms. This will help you avoid the fungus that grows there.  Don't share nail clippers or scissors with others.  Follow-up care  Follow up with your healthcare provider, or as advised.  When to seek medical advice  Call your healthcare provider right away  if any of these occur:  Skin by the nail becomes red, swollen, painful, or drains pus (a creamy yellow or white liquid)  Side effects from oral anti-fungal medicines  Date Last Reviewed: 8/1/2016 © 2000-2017 [employer]. [address] [place] [address]. All rights reserved. This information is not intended as a substitute for professional medical care. Always follow your healthcare professional's instructions.       Bunion    You have a bunion. This is a bony bump at the base of your big toe, along the inside edge of your foot. As the bump gets bigger, it can become red, swollen, and painful with shoe wear.  Bunions may occur if you wear shoes that are too tight and pinch your toes together. High heels may make this worse. In some cases a bunion is due to poor alignment of the foot and ankle. This puts extra weight on the instep of each foot.  Once a bunion forms, it changes the way weight is spread all across your foot. This causes the bunion to get worse over time. The big toe will bend more and more toward the other toes.  A minor bunion can be treated by:  Wearing properly fitting shoes  Using bunion pads  Wearing shoe inserts, called orthotics, to better align the foot and ankle  Physical therapy with ultrasound or whirlpool baths can ease pain, redness, and swelling. Severe cases may require surgery. If you dont treat what is causing the bunion, it may get larger and more painful.  Home care  Limit high heels. These shoes force your foot forward, crowding the toes together.  Switch to comfortable shoes with a wide toe area. Or have your existing shoes stretched by a shoe repair shop.  Avoid shoes that are tight, narrow, or pointed.  If you are flat-footed, using arch supports may help prevent further deformity. The best shoe inserts are the ones custom made by a foot specialist, called a podiatrist, or other healthcare provider.  Put a bunion pad over the bunion to ease pressure of  your shoe against the bunion. You can buy these pads at most pharmacies without a prescription  To reduce pain and swelling, apply an ice pack over the injured area for 15 to 20 minutes. Do this every 1 to 2 hours the first day. Keep using ice 3 to 4 times a day until the pain and swelling goes away.  To make an ice pack, put ice cubes in a sealed zip-lock plastic bag. Wrap the bag in a clean, thin towel or cloth. Never put ice or an ice pack directly on the skin.  You may use over-the-counter pain medicine to control pain, unless another medicine was prescribed. Talk with your provider before using these medicines if you have chronic liver or kidney disease, or ever had a stomach ulcer or GI (gastrointestinal) bleeding.  Follow-up care  Follow up with a podiatrist or foot doctor, or as advised.  If X-rays were taken, you will be notified of any new findings that may affect your care.  When to seek medical care  Contact your healthcare provider if any of the following occur:  Increasing pain or redness around the base of the big toe  Painful ingrown toenail, with redness and swelling or pus around the nail  Date Last Reviewed: 11/21/2015  © 6751-3561 [employer]. [address] [place] [address], [place] [address]. All rights reserved. This information is not intended as a substitute for professional medical care. Always follow your healthcare professional's instructions.

## 2022-07-19 ENCOUNTER — LAB VISIT (OUTPATIENT)
Dept: LAB | Facility: HOSPITAL | Age: 82
End: 2022-07-19
Attending: NURSE PRACTITIONER
Payer: MEDICARE

## 2022-07-19 DIAGNOSIS — E78.2 MIXED HYPERLIPIDEMIA: ICD-10-CM

## 2022-07-19 DIAGNOSIS — N18.31 STAGE 3A CHRONIC KIDNEY DISEASE: ICD-10-CM

## 2022-07-19 DIAGNOSIS — I10 ESSENTIAL HYPERTENSION: ICD-10-CM

## 2022-07-19 DIAGNOSIS — D50.8 IRON DEFICIENCY ANEMIA SECONDARY TO INADEQUATE DIETARY IRON INTAKE: ICD-10-CM

## 2022-07-19 LAB
ALBUMIN SERPL BCP-MCNC: 4.2 G/DL (ref 3.5–5.2)
ALP SERPL-CCNC: 63 U/L (ref 55–135)
ALT SERPL W/O P-5'-P-CCNC: 10 U/L (ref 10–44)
ANION GAP SERPL CALC-SCNC: 7 MMOL/L (ref 8–16)
AST SERPL-CCNC: 15 U/L (ref 10–40)
BASOPHILS # BLD AUTO: 0.03 K/UL (ref 0–0.2)
BASOPHILS NFR BLD: 0.5 % (ref 0–1.9)
BILIRUB SERPL-MCNC: 0.6 MG/DL (ref 0.1–1)
BUN SERPL-MCNC: 14 MG/DL (ref 8–23)
CALCIUM SERPL-MCNC: 9.1 MG/DL (ref 8.7–10.5)
CHLORIDE SERPL-SCNC: 105 MMOL/L (ref 95–110)
CHOLEST SERPL-MCNC: 200 MG/DL (ref 120–199)
CHOLEST/HDLC SERPL: 4.3 {RATIO} (ref 2–5)
CO2 SERPL-SCNC: 25 MMOL/L (ref 23–29)
CREAT SERPL-MCNC: 1 MG/DL (ref 0.5–1.4)
DIFFERENTIAL METHOD: ABNORMAL
EOSINOPHIL # BLD AUTO: 0.2 K/UL (ref 0–0.5)
EOSINOPHIL NFR BLD: 3.2 % (ref 0–8)
ERYTHROCYTE [DISTWIDTH] IN BLOOD BY AUTOMATED COUNT: 17.2 % (ref 11.5–14.5)
EST. GFR  (AFRICAN AMERICAN): >60 ML/MIN/1.73 M^2
EST. GFR  (NON AFRICAN AMERICAN): 52.2 ML/MIN/1.73 M^2
FERRITIN SERPL-MCNC: 13 NG/ML (ref 20–300)
GLUCOSE SERPL-MCNC: 94 MG/DL (ref 70–110)
HCT VFR BLD AUTO: 39.5 % (ref 37–48.5)
HDLC SERPL-MCNC: 47 MG/DL (ref 40–75)
HDLC SERPL: 23.5 % (ref 20–50)
HGB BLD-MCNC: 12 G/DL (ref 12–16)
IMM GRANULOCYTES # BLD AUTO: 0.02 K/UL (ref 0–0.04)
IMM GRANULOCYTES NFR BLD AUTO: 0.3 % (ref 0–0.5)
IRON SERPL-MCNC: 35 UG/DL (ref 30–160)
LDLC SERPL CALC-MCNC: 125.2 MG/DL (ref 63–159)
LYMPHOCYTES # BLD AUTO: 2.4 K/UL (ref 1–4.8)
LYMPHOCYTES NFR BLD: 40.9 % (ref 18–48)
MCH RBC QN AUTO: 26.4 PG (ref 27–31)
MCHC RBC AUTO-ENTMCNC: 30.4 G/DL (ref 32–36)
MCV RBC AUTO: 87 FL (ref 82–98)
MONOCYTES # BLD AUTO: 0.5 K/UL (ref 0.3–1)
MONOCYTES NFR BLD: 7.7 % (ref 4–15)
NEUTROPHILS # BLD AUTO: 2.8 K/UL (ref 1.8–7.7)
NEUTROPHILS NFR BLD: 47.4 % (ref 38–73)
NONHDLC SERPL-MCNC: 153 MG/DL
NRBC BLD-RTO: 0 /100 WBC
PLATELET # BLD AUTO: 210 K/UL (ref 150–450)
PMV BLD AUTO: 11 FL (ref 9.2–12.9)
POTASSIUM SERPL-SCNC: 4.4 MMOL/L (ref 3.5–5.1)
PROT SERPL-MCNC: 7 G/DL (ref 6–8.4)
RBC # BLD AUTO: 4.54 M/UL (ref 4–5.4)
SATURATED IRON: 9 % (ref 20–50)
SODIUM SERPL-SCNC: 137 MMOL/L (ref 136–145)
TOTAL IRON BINDING CAPACITY: 381 UG/DL (ref 250–450)
TRANSFERRIN SERPL-MCNC: 272 MG/DL (ref 200–375)
TRIGL SERPL-MCNC: 139 MG/DL (ref 30–150)
WBC # BLD AUTO: 5.96 K/UL (ref 3.9–12.7)

## 2022-07-19 PROCEDURE — 80053 COMPREHEN METABOLIC PANEL: CPT | Performed by: NURSE PRACTITIONER

## 2022-07-19 PROCEDURE — 82728 ASSAY OF FERRITIN: CPT | Performed by: NURSE PRACTITIONER

## 2022-07-19 PROCEDURE — 80061 LIPID PANEL: CPT | Performed by: NURSE PRACTITIONER

## 2022-07-19 PROCEDURE — 36415 COLL VENOUS BLD VENIPUNCTURE: CPT | Performed by: NURSE PRACTITIONER

## 2022-07-19 PROCEDURE — 85025 COMPLETE CBC W/AUTO DIFF WBC: CPT | Performed by: NURSE PRACTITIONER

## 2022-07-19 PROCEDURE — 84466 ASSAY OF TRANSFERRIN: CPT | Performed by: NURSE PRACTITIONER

## 2022-07-21 ENCOUNTER — OFFICE VISIT (OUTPATIENT)
Dept: FAMILY MEDICINE | Facility: CLINIC | Age: 82
End: 2022-07-21
Payer: MEDICARE

## 2022-07-21 VITALS
SYSTOLIC BLOOD PRESSURE: 129 MMHG | OXYGEN SATURATION: 95 % | HEART RATE: 81 BPM | TEMPERATURE: 98 F | RESPIRATION RATE: 18 BRPM | WEIGHT: 129 LBS | BODY MASS INDEX: 22.85 KG/M2 | DIASTOLIC BLOOD PRESSURE: 81 MMHG

## 2022-07-21 DIAGNOSIS — D50.8 IRON DEFICIENCY ANEMIA SECONDARY TO INADEQUATE DIETARY IRON INTAKE: ICD-10-CM

## 2022-07-21 DIAGNOSIS — N18.31 STAGE 3A CHRONIC KIDNEY DISEASE: ICD-10-CM

## 2022-07-21 DIAGNOSIS — M25.551 RIGHT HIP PAIN: ICD-10-CM

## 2022-07-21 DIAGNOSIS — F41.9 ANXIETY: ICD-10-CM

## 2022-07-21 DIAGNOSIS — E78.2 MIXED HYPERLIPIDEMIA: ICD-10-CM

## 2022-07-21 DIAGNOSIS — I10 ESSENTIAL HYPERTENSION: ICD-10-CM

## 2022-07-21 DIAGNOSIS — M15.9 PRIMARY OSTEOARTHRITIS INVOLVING MULTIPLE JOINTS: Primary | ICD-10-CM

## 2022-07-21 PROCEDURE — 99214 OFFICE O/P EST MOD 30 MIN: CPT | Mod: S$PBB,,, | Performed by: NURSE PRACTITIONER

## 2022-07-21 PROCEDURE — 99214 OFFICE O/P EST MOD 30 MIN: CPT | Performed by: NURSE PRACTITIONER

## 2022-07-21 PROCEDURE — 99214 PR OFFICE/OUTPT VISIT, EST, LEVL IV, 30-39 MIN: ICD-10-PCS | Mod: S$PBB,,, | Performed by: NURSE PRACTITIONER

## 2022-07-21 RX ORDER — ALPRAZOLAM 0.25 MG/1
0.25 TABLET ORAL 2 TIMES DAILY PRN
Qty: 60 TABLET | Refills: 0 | Status: SHIPPED | OUTPATIENT
Start: 2022-07-21 | End: 2022-10-24 | Stop reason: SDUPTHER

## 2022-07-21 RX ORDER — MELOXICAM 7.5 MG/1
7.5 TABLET ORAL DAILY
Qty: 30 TABLET | Refills: 0 | Status: SHIPPED | OUTPATIENT
Start: 2022-07-21 | End: 2022-08-18

## 2022-07-21 NOTE — PROGRESS NOTES
SUBJECTIVE:      Patient ID: Martha Alonso is a 81 y.o. female.    Chief Complaint: Hyperlipidemia and Anxiety    81-year-old female presents to the clinic for anxiety, hyperlipidemia, hypertension, and anemia follow-up and lab review.      Patient has been doing well.  Mood is stable.  Anxiety is controlled.  Patient takes Xanax as needed.      Blood pressure is controlled with Norvasc 5 mg.      Anemia is stable with ferrous gluconate 324 mg. Iron saturation 9%, ferritin 13, Hgb 12.0, Hct 39.5  And RDW 17.2.    Kidney function remains stable, eGFR 52.2. BUN and Creatinine were wnl.    She continues to have pain in her feet, hands, and right hip.  She is currently taking Tylenol, Voltaren gel, and Ibuprofen for arthritis. She recently saw Dr. Andrade for her feet.  He recommended treatment with shoe modification, pads, treating symptoms with OTC NSAIDs, pads between toes, inserts and pads over the bunion.  Patient does not like to wear tennis shoes and is wearing sandals in clinic.  Attempted a trial of Celebrex, but patient did not start the medication due to the side effect profile.     Anxiety  Presents for follow-up visit. Symptoms include excessive worry, irritability, nervous/anxious behavior and restlessness. Patient reports no chest pain, confusion, dizziness, hyperventilation, nausea, palpitations or shortness of breath. Symptoms occur occasionally. The severity of symptoms is mild. The quality of sleep is good. Nighttime awakenings: occasional.     Compliance with medications is %. Treatment side effects: None.   Hypertension  This is a chronic problem. The current episode started more than 1 year ago. The problem is controlled. Associated symptoms include anxiety. Pertinent negatives include no blurred vision, chest pain, headaches, malaise/fatigue, neck pain, orthopnea, palpitations, peripheral edema, PND, shortness of breath or sweats. There are no associated agents to hypertension. Risk  factors for coronary artery disease include dyslipidemia, sedentary lifestyle and post-menopausal state. Past treatments include ACE inhibitors and calcium channel blockers. The current treatment provides significant improvement. There are no compliance problems.  There is no history of chronic renal disease.   Hyperlipidemia  This is a chronic problem. The current episode started more than 1 year ago. The problem is resistant. Recent lipid tests were reviewed and are variable. She has no history of chronic renal disease, diabetes, hypothyroidism, liver disease, obesity or nephrotic syndrome. Factors aggravating her hyperlipidemia include fatty foods. Pertinent negatives include no chest pain, focal sensory loss, focal weakness, leg pain, myalgias or shortness of breath. Current antihyperlipidemic treatment includes diet change. The current treatment provides moderate improvement of lipids. There are no compliance problems.  Risk factors for coronary artery disease include dyslipidemia, hypertension and post-menopausal.       History reviewed. No pertinent family history.   Social History     Socioeconomic History    Marital status:    Occupational History    Occupation: retired   Tobacco Use    Smoking status: Never Smoker    Smokeless tobacco: Never Used   Substance and Sexual Activity    Drug use: Never    Sexual activity: Not Currently     Current Outpatient Medications   Medication Sig Dispense Refill    amLODIPine (NORVASC) 5 MG tablet TAKE 1 TABLET(5 MG) BY MOUTH EVERY DAY 90 tablet 1    benazepriL (LOTENSIN) 20 MG tablet TAKE 1 TABLET(20 MG) BY MOUTH EVERY DAY 90 tablet 1    diclofenac sodium (VOLTAREN) 1 % Gel Apply topically 2 (two) times daily. 450 g 3    ferrous gluconate (FERGON) 324 MG tablet Take 1 tablet (324 mg total) by mouth daily with breakfast. 90 tablet 0    ibuprofen (ADVIL,MOTRIN) 200 MG tablet Take 200 mg by mouth every 6 (six) hours as needed for Pain. As needed for hip  pain      mesalamine (CANASA) 1000 MG Supp Canasa 1,000 mg rectal suppository      ondansetron (ZOFRAN-ODT) 4 MG TbDL Take 1 tablet (4 mg total) by mouth every 12 (twelve) hours as needed (nausea). 21 tablet 0    phenazopyridine (PYRIDIUM) 95 MG tablet Take 95 mg by mouth 3 (three) times daily as needed for Pain.      VENTOLIN HFA 90 mcg/actuation inhaler Inhale 2 puffs into the lungs every 4 to 6 hours as needed.      ALPRAZolam (XANAX) 0.25 MG tablet Take 1 tablet (0.25 mg total) by mouth 2 (two) times daily as needed for Anxiety. 60 tablet 0    famotidine (PEPCID) 20 MG tablet Take 1 tablet (20 mg total) by mouth 2 (two) times daily. 180 tablet 1    meloxicam (MOBIC) 7.5 MG tablet Take 1 tablet (7.5 mg total) by mouth once daily. 30 tablet 0     No current facility-administered medications for this visit.     Review of patient's allergies indicates:  No Known Allergies   Past Medical History:   Diagnosis Date    Hypertension     Rheumatoid arthritis      History reviewed. No pertinent surgical history.    Review of Systems   Constitutional: Positive for irritability. Negative for activity change, appetite change, chills, diaphoresis, fatigue, fever, malaise/fatigue and unexpected weight change.   HENT: Negative for congestion, ear pain, sinus pressure, sore throat, trouble swallowing and voice change.    Eyes: Negative for blurred vision, pain, discharge and visual disturbance.   Respiratory: Negative for cough, chest tightness, shortness of breath and wheezing.    Cardiovascular: Negative for chest pain, palpitations, orthopnea and PND.   Gastrointestinal: Negative for abdominal pain, constipation, diarrhea, nausea and vomiting.   Genitourinary: Negative for difficulty urinating, flank pain, frequency and urgency.   Musculoskeletal: Positive for arthralgias and gait problem. Negative for back pain, joint swelling, myalgias and neck pain.        Bilateral foot pain, right hip pain   Skin: Negative for  color change and rash.   Neurological: Negative for dizziness, focal weakness, seizures, syncope, weakness, numbness and headaches.   Hematological: Negative for adenopathy. Does not bruise/bleed easily.        Anemia   Psychiatric/Behavioral: Negative for confusion, dysphoric mood and sleep disturbance. The patient is nervous/anxious.       OBJECTIVE:      Vitals:    07/21/22 1043   BP: 129/81   Pulse: 81   Resp: 18   Temp: 98 °F (36.7 °C)   SpO2: 95%   Weight: 58.5 kg (129 lb)     Physical Exam   Assessment:       1. Primary osteoarthritis involving multiple joints    2. Anxiety    3. Right hip pain    4. Essential hypertension    5. Mixed hyperlipidemia    6. Iron deficiency anemia secondary to inadequate dietary iron intake    7. Stage 3a chronic kidney disease        Plan:       Primary osteoarthritis involving multiple joints  Start Meloxicam prn.  Discussed using Tylenol Arthritis first along with Voltaren gel.    -     meloxicam (MOBIC) 7.5 MG tablet; Take 1 tablet (7.5 mg total) by mouth once daily.  Dispense: 30 tablet; Refill: 0    Anxiety  Stable, continue current treatment.   -     ALPRAZolam (XANAX) 0.25 MG tablet; Take 1 tablet (0.25 mg total) by mouth 2 (two) times daily as needed for Anxiety.  Dispense: 60 tablet; Refill: 0    Right hip pain  Start Meloxicam.  Possible bursitis to right hip vs osteoarthritis.  Offered ortho referral, family and patient to notify clinic if no improvement with meloxicam.     Essential hypertension  Stable with amlodipine 5 mg.  Low sodium diet.     Mixed hyperlipidemia  Stable, limit red meat, butter, fried foods, cheese, and other foods that have a lot of saturated fat. Consume more: lean meats, fish, fruits, vegetables, whole grains, beans, lentils, and nuts.      Iron deficiency anemia secondary to inadequate dietary iron intake  Stable, continue ferrous gluconate.     Stage 3a chronic kidney disease  Stable, no change in eGFR.  Will continue to monitor.     This  note was created using [employer] voice recognition software that occasionally misinterprets phrases or words.     Follow up in about 3 months (around 10/21/2022) for Anxiety.      7/21/2022 ELINA Johnston, MARIE

## 2022-10-24 DIAGNOSIS — F41.9 ANXIETY: ICD-10-CM

## 2022-10-24 RX ORDER — ALPRAZOLAM 0.25 MG/1
0.25 TABLET ORAL 2 TIMES DAILY PRN
Qty: 60 TABLET | Refills: 0 | Status: SHIPPED | OUTPATIENT
Start: 2022-10-24 | End: 2022-11-28

## 2022-10-26 ENCOUNTER — OFFICE VISIT (OUTPATIENT)
Dept: FAMILY MEDICINE | Facility: CLINIC | Age: 82
End: 2022-10-26
Payer: MEDICARE

## 2022-10-26 DIAGNOSIS — M19.071 PRIMARY OSTEOARTHRITIS OF BOTH FEET: ICD-10-CM

## 2022-10-26 DIAGNOSIS — M19.072 PRIMARY OSTEOARTHRITIS OF BOTH FEET: ICD-10-CM

## 2022-10-26 DIAGNOSIS — F41.9 ANXIETY: Primary | ICD-10-CM

## 2022-10-26 PROCEDURE — 99213 PR OFFICE/OUTPT VISIT, EST, LEVL III, 20-29 MIN: ICD-10-PCS | Mod: 95,,, | Performed by: NURSE PRACTITIONER

## 2022-10-26 PROCEDURE — 99213 OFFICE O/P EST LOW 20 MIN: CPT | Mod: 95,,, | Performed by: NURSE PRACTITIONER

## 2022-10-26 NOTE — PROGRESS NOTES
Subjective:        The chief complaint leading to consultation is: anxiety  The patient location is:  Home  Visit type: Virtual visit with synchronous audio/video or audio only  This was a video visit in lieu of in-person visit due to the coronavirus emergency. Patient acknowledged and consented to the video visit encounter.     82-year-old female presents to the clinic for anxiety follow-up.  Patient has been doing well.      Anxiety is stable with Xanax.  I have offered additional medication to help with anxiety in the past, but patient states it is manageable.  Her mood is stable today.  Denies SI.    She has been taking the meloxicam for arthritis as needed.  Reports it is helping some.     Patient would like to schedule a nurse visit to receive the influenza vaccine.       No past surgical history on file.  Past Medical History:   Diagnosis Date    Hypertension     Rheumatoid arthritis      No family history on file.     Social History:   Marital Status:   Alcohol History:  has no history on file for alcohol use.  Tobacco History:  reports that she has never smoked. She has never used smokeless tobacco.  Drug History:  reports no history of drug use.    Review of patient's allergies indicates:  No Known Allergies    Current Outpatient Medications   Medication Sig Dispense Refill    ALPRAZolam (XANAX) 0.25 MG tablet Take 1 tablet (0.25 mg total) by mouth 2 (two) times daily as needed for Anxiety. 60 tablet 0    amLODIPine (NORVASC) 5 MG tablet TAKE 1 TABLET(5 MG) BY MOUTH EVERY DAY 90 tablet 1    benazepriL (LOTENSIN) 20 MG tablet TAKE 1 TABLET(20 MG) BY MOUTH EVERY DAY 90 tablet 1    diclofenac sodium (VOLTAREN) 1 % Gel Apply topically 2 (two) times daily. 450 g 3    famotidine (PEPCID) 20 MG tablet Take 1 tablet (20 mg total) by mouth 2 (two) times daily. 180 tablet 1    ferrous gluconate (FERGON) 324 MG tablet Take 1 tablet (324 mg total) by mouth daily with breakfast. 90 tablet 0    ibuprofen  (ADVIL,MOTRIN) 200 MG tablet Take 200 mg by mouth every 6 (six) hours as needed for Pain. As needed for hip pain      meloxicam (MOBIC) 7.5 MG tablet TAKE 1 TABLET(7.5 MG) BY MOUTH EVERY DAY 30 tablet 0    ondansetron (ZOFRAN-ODT) 4 MG TbDL Take 1 tablet (4 mg total) by mouth every 12 (twelve) hours as needed (nausea). 21 tablet 0     No current facility-administered medications for this visit.       Review of Systems   Constitutional:  Negative for activity change, appetite change, chills, diaphoresis, fatigue, fever and unexpected weight change.   HENT:  Negative for hearing loss, rhinorrhea and trouble swallowing.    Eyes:  Negative for discharge and visual disturbance.   Respiratory:  Negative for chest tightness and wheezing.    Cardiovascular:  Negative for chest pain and palpitations.   Gastrointestinal:  Negative for blood in stool, constipation, diarrhea and vomiting.   Endocrine: Negative for polydipsia and polyuria.   Genitourinary:  Negative for difficulty urinating, dysuria, hematuria and menstrual problem.   Musculoskeletal:  Positive for arthralgias. Negative for joint swelling and neck pain.   Neurological:  Negative for weakness and headaches.   Psychiatric/Behavioral:  Negative for confusion and dysphoric mood.        Objective:        Physical Exam:   Physical Exam  Constitutional:       General: She is not in acute distress.     Appearance: Normal appearance. She is not ill-appearing or toxic-appearing.   HENT:      Head: Normocephalic and atraumatic.   Pulmonary:      Effort: Pulmonary effort is normal.   Neurological:      Mental Status: She is alert.   Psychiatric:         Mood and Affect: Mood normal.         Behavior: Behavior normal.         Thought Content: Thought content normal.         Judgment: Judgment normal.            Assessment:       1. Anxiety    2. Primary osteoarthritis of both feet      Plan:   Anxiety  Stable, continue Xanax.    Primary osteoarthritis of both feet  Continue  meloxicam prn.     No follow-ups on file.    Total time spent with patient: 10 min    This note was created using [employer] voice recognition software that occasionally misinterprets phrases or words.

## 2022-11-03 ENCOUNTER — TELEPHONE (OUTPATIENT)
Dept: FAMILY MEDICINE | Facility: CLINIC | Age: 82
End: 2022-11-03

## 2022-11-03 ENCOUNTER — PATIENT MESSAGE (OUTPATIENT)
Dept: FAMILY MEDICINE | Facility: CLINIC | Age: 82
End: 2022-11-03

## 2022-11-03 ENCOUNTER — HOSPITAL ENCOUNTER (OUTPATIENT)
Dept: RADIOLOGY | Facility: HOSPITAL | Age: 82
Discharge: HOME | End: 2022-11-03
Attending: NURSE PRACTITIONER
Payer: MEDICARE

## 2022-11-03 DIAGNOSIS — Z78.0 ASYMPTOMATIC POSTMENOPAUSAL STATE: ICD-10-CM

## 2022-11-03 PROCEDURE — 77080 DXA BONE DENSITY AXIAL: CPT | Mod: TC,PO

## 2022-11-03 NOTE — PROGRESS NOTES
Bone density scan shows osteoporosis. She is at high risk for a fracture.  Recommend a weekly bisphosphonate such as Fosamax.  One of the more common side effects is gastritis - upset stomach. You have to avoid lying down for 30 minutes after taking the medicine. Please let me know if she wants to proceed with taking the medicine.

## 2022-11-03 NOTE — TELEPHONE ENCOUNTER
----- Message from Pedro Pablo Coronado NP sent at 11/3/2022 12:57 PM CDT -----  Bone density scan shows osteoporosis. She is at high risk for a fracture.  Recommend a weekly bisphosphonate such as Fosamax.  One of the more common side effects is gastritis - upset stomach. You have to avoid lying down for 30 minutes after taking the medicine. Please let me know if she wants to proceed with taking the medicine.

## 2023-01-27 ENCOUNTER — OFFICE VISIT (OUTPATIENT)
Dept: FAMILY MEDICINE | Facility: CLINIC | Age: 83
End: 2023-01-27
Payer: MEDICARE

## 2023-01-27 VITALS
TEMPERATURE: 98 F | WEIGHT: 129.38 LBS | HEIGHT: 63 IN | OXYGEN SATURATION: 98 % | SYSTOLIC BLOOD PRESSURE: 143 MMHG | BODY MASS INDEX: 22.93 KG/M2 | HEART RATE: 93 BPM | DIASTOLIC BLOOD PRESSURE: 84 MMHG

## 2023-01-27 DIAGNOSIS — F41.9 ANXIETY: Primary | ICD-10-CM

## 2023-01-27 DIAGNOSIS — E78.2 MIXED HYPERLIPIDEMIA: ICD-10-CM

## 2023-01-27 DIAGNOSIS — N18.31 STAGE 3A CHRONIC KIDNEY DISEASE: ICD-10-CM

## 2023-01-27 DIAGNOSIS — M54.2 NECK PAIN: ICD-10-CM

## 2023-01-27 DIAGNOSIS — M25.551 CHRONIC RIGHT HIP PAIN: ICD-10-CM

## 2023-01-27 DIAGNOSIS — I10 ESSENTIAL HYPERTENSION: ICD-10-CM

## 2023-01-27 DIAGNOSIS — G89.29 CHRONIC RIGHT HIP PAIN: ICD-10-CM

## 2023-01-27 DIAGNOSIS — M15.9 PRIMARY OSTEOARTHRITIS INVOLVING MULTIPLE JOINTS: ICD-10-CM

## 2023-01-27 DIAGNOSIS — D50.8 IRON DEFICIENCY ANEMIA SECONDARY TO INADEQUATE DIETARY IRON INTAKE: ICD-10-CM

## 2023-01-27 PROCEDURE — 99215 OFFICE O/P EST HI 40 MIN: CPT | Performed by: NURSE PRACTITIONER

## 2023-01-27 PROCEDURE — 99214 OFFICE O/P EST MOD 30 MIN: CPT | Mod: S$PBB,,, | Performed by: NURSE PRACTITIONER

## 2023-01-27 PROCEDURE — 99214 PR OFFICE/OUTPT VISIT, EST, LEVL IV, 30-39 MIN: ICD-10-PCS | Mod: S$PBB,,, | Performed by: NURSE PRACTITIONER

## 2023-01-27 NOTE — PROGRESS NOTES
SUBJECTIVE:      Patient ID: Martha Alonso is a 82 y.o. female.    Chief Complaint: Follow-up and Arthritis    82-year-old female presents to the clinic for anxiety and arthritis follow-up.  Patient has been doing well.      Anxiety is stable with Xanax.  I have offered additional medication to help with anxiety in the past, but patient states it is manageable.  Her mood is stable today. Denies SI.    She has been taking the meloxicam and Voltaren for arthritis as needed.  Reports the arthritis is not good.  Reports increasd pain to right hip and neck/shoulders.  Pain is worse with activity. No injury or trauma.  She does have osteoporosis, but does not want to start treatment due to possible side effects.      Blood pressure is slightly elevated today, she is anxious in clinic. Patient and family reports control at home.      Due for repeat labs.      Arthritis  Presents for follow-up visit. She complains of pain. She reports no joint swelling. The symptoms have been worsening. Affected locations include the neck and right hip. Her pain is at a severity of 5/10. Associated symptoms include pain while resting. Pertinent negatives include no diarrhea, fatigue, fever or rash. Compliance with total regimen is %. Compliance with medications is %.     History reviewed. No pertinent family history.   Social History     Socioeconomic History    Marital status:    Occupational History    Occupation: retired   Tobacco Use    Smoking status: Never    Smokeless tobacco: Never   Substance and Sexual Activity    Drug use: Never    Sexual activity: Not Currently     Current Outpatient Medications   Medication Sig Dispense Refill    ALPRAZolam (XANAX) 0.25 MG tablet TAKE 1 TABLET(0.25 MG) BY MOUTH TWICE DAILY AS NEEDED FOR ANXIETY 60 tablet 0    amLODIPine (NORVASC) 5 MG tablet Take 1 tablet (5 mg total) by mouth once daily. 90 tablet 1    benazepriL (LOTENSIN) 20 MG tablet Take 1 tablet (20 mg total) by  mouth once daily. 90 tablet 1    diclofenac sodium (VOLTAREN) 1 % Gel Apply topically 2 (two) times daily. 450 g 3    famotidine (PEPCID) 20 MG tablet Take 1 tablet (20 mg total) by mouth 2 (two) times daily. 180 tablet 1    ferrous gluconate (FERGON) 324 MG tablet Take 1 tablet (324 mg total) by mouth daily with breakfast. 90 tablet 0    ibuprofen (ADVIL,MOTRIN) 200 MG tablet Take 200 mg by mouth every 6 (six) hours as needed for Pain. As needed for hip pain      meloxicam (MOBIC) 7.5 MG tablet TAKE 1 TABLET(7.5 MG) BY MOUTH EVERY DAY 30 tablet 0    ondansetron (ZOFRAN-ODT) 4 MG TbDL Take 1 tablet (4 mg total) by mouth every 12 (twelve) hours as needed (nausea). 21 tablet 0     No current facility-administered medications for this visit.     Review of patient's allergies indicates:  No Known Allergies   Past Medical History:   Diagnosis Date    Hypertension     Rheumatoid arthritis      History reviewed. No pertinent surgical history.    Review of Systems   Constitutional:  Negative for activity change, appetite change, chills, diaphoresis, fatigue, fever and unexpected weight change.   HENT:  Negative for congestion, ear pain, sinus pressure, sore throat, trouble swallowing and voice change.    Eyes:  Negative for pain, discharge and visual disturbance.   Respiratory:  Negative for cough, chest tightness, shortness of breath and wheezing.    Cardiovascular:  Negative for chest pain and palpitations.   Gastrointestinal:  Negative for abdominal pain, constipation, diarrhea, nausea and vomiting.   Genitourinary:  Negative for difficulty urinating, flank pain, frequency and urgency.   Musculoskeletal:  Positive for arthralgias, arthritis, gait problem and neck pain. Negative for back pain, joint swelling and myalgias.        Right hip pain   Skin:  Negative for color change and rash.   Neurological:  Negative for dizziness, seizures, syncope, weakness, numbness and headaches.   Hematological:  Negative for adenopathy.  "Does not bruise/bleed easily.        Anemia   Psychiatric/Behavioral:  Negative for confusion, dysphoric mood and sleep disturbance. The patient is nervous/anxious.     OBJECTIVE:      Vitals:    01/27/23 1029 01/27/23 1100   BP: (!) 156/86 (!) 143/84   BP Location: Left arm    Patient Position: Sitting    BP Method: Medium (Automatic)    Pulse: 93    Temp: 97.8 °F (36.6 °C)    TempSrc: Oral    SpO2: 98%    Weight: 58.7 kg (129 lb 6.4 oz)    Height: 5' 3" (1.6 m)      Physical Exam  Vitals and nursing note reviewed.   Constitutional:       General: She is awake. She is not in acute distress.     Appearance: She is well-developed and normal weight. She is not ill-appearing, toxic-appearing or diaphoretic.   HENT:      Head: Normocephalic and atraumatic.      Nose: Nose normal.   Eyes:      General: Lids are normal. Gaze aligned appropriately.      Conjunctiva/sclera: Conjunctivae normal.      Right eye: Right conjunctiva is not injected.      Left eye: Left conjunctiva is not injected.      Pupils: Pupils are equal, round, and reactive to light.   Neck:     Cardiovascular:      Rate and Rhythm: Normal rate and regular rhythm.      Pulses: Normal pulses.      Heart sounds: Normal heart sounds, S1 normal and S2 normal. No murmur heard.    No friction rub. No gallop.   Pulmonary:      Effort: Pulmonary effort is normal. No respiratory distress.      Breath sounds: Normal breath sounds. No stridor. No decreased breath sounds, wheezing, rhonchi or rales.   Chest:      Chest wall: No tenderness.   Abdominal:      General: Abdomen is protuberant.      Palpations: Abdomen is soft.   Musculoskeletal:      Cervical back: Neck supple. Muscular tenderness present. Normal range of motion.      Right hip: Tenderness and bony tenderness present.      Right lower leg: No edema.      Left lower leg: No edema.        Legs:    Lymphadenopathy:      Cervical: No cervical adenopathy.   Skin:     General: Skin is warm and dry.      " Capillary Refill: Capillary refill takes less than 2 seconds.      Findings: No erythema or rash.   Neurological:      Mental Status: She is alert and oriented to person, place, and time. Mental status is at baseline.   Psychiatric:         Attention and Perception: Attention normal.         Mood and Affect: Mood is anxious.         Speech: Speech normal.         Behavior: Behavior normal. Behavior is cooperative.         Thought Content: Thought content normal.         Judgment: Judgment normal.      Comments: Shaky      Assessment:       1. Anxiety    2. Primary osteoarthritis involving multiple joints    3. Chronic right hip pain    4. Neck pain    5. Essential hypertension    6. Mixed hyperlipidemia    7. Iron deficiency anemia secondary to inadequate dietary iron intake    8. Stage 3a chronic kidney disease        Plan:       Anxiety  Stable, continue Xanax prn.   -     TSH; Future; Expected date: 01/27/2023    Primary osteoarthritis involving multiple joints  Continue Meloxicam and Voltaren.  Will check renal function.  Patient referred to ortho.    Chronic right hip pain  Possible bursitis vs arthritis.  Pain has been chronic and worse with activity.  Meloxicam and Voltaren gel is not helping.   -     Ambulatory referral/consult to Orthopedics; Future; Expected date: 02/03/2023    Neck pain  -     Ambulatory referral/consult to Orthopedics; Future; Expected date: 02/03/2023    Essential hypertension  Elevated today, but patient is nervous.  Continue amlodipine 5 mg and benazepril 20 mg. Monitor and log BP at home.  If BP remains elevated will increase amlodipine to 10 mg.  Low sodium/low fat diet.   -     Comprehensive Metabolic Panel; Future; Expected date: 01/27/2023  -     Lipid Panel; Future; Expected date: 01/27/2023  -     TSH; Future; Expected date: 01/27/2023  -     Microalbumin/Creatinine Ratio, Urine; Future; Expected date: 01/27/2023  -     Urinalysis; Future; Expected date: 01/27/2023    Mixed  hyperlipidemia  Limit red meat, butter, fried foods, cheese, and other foods that have a lot of saturated fat. Consume more: lean meats, fish, fruits, vegetables, whole grains, beans, lentils, and nuts.    -     Comprehensive Metabolic Panel; Future; Expected date: 01/27/2023  -     Lipid Panel; Future; Expected date: 01/27/2023  -     TSH; Future; Expected date: 01/27/2023    Iron deficiency anemia secondary to inadequate dietary iron intake  -     CBC Auto Differential; Future; Expected date: 01/27/2023  -     Comprehensive Metabolic Panel; Future; Expected date: 01/27/2023  -     Ferritin; Future; Expected date: 01/27/2023  -     Iron and TIBC; Future; Expected date: 01/27/2023    Stage 3a chronic kidney disease  -     CBC Auto Differential; Future; Expected date: 01/27/2023  -     Comprehensive Metabolic Panel; Future; Expected date: 01/27/2023  -     Microalbumin/Creatinine Ratio, Urine; Future; Expected date: 01/27/2023  -     Urinalysis; Future; Expected date: 01/27/2023    This note was created using [employer] voice recognition software that occasionally misinterprets phrases or words.     Follow up in about 3 months (around 4/27/2023) for Anxiety.      1/27/2023 ELINA Johnston, MARIE

## 2023-01-30 ENCOUNTER — TELEPHONE (OUTPATIENT)
Dept: FAMILY MEDICINE | Facility: CLINIC | Age: 83
End: 2023-01-30

## 2023-02-01 ENCOUNTER — PATIENT MESSAGE (OUTPATIENT)
Dept: FAMILY MEDICINE | Facility: CLINIC | Age: 83
End: 2023-02-01

## 2023-02-01 DIAGNOSIS — M25.551 CHRONIC RIGHT HIP PAIN: Primary | ICD-10-CM

## 2023-02-01 DIAGNOSIS — G89.29 CHRONIC RIGHT HIP PAIN: Primary | ICD-10-CM

## 2023-02-01 DIAGNOSIS — M15.9 PRIMARY OSTEOARTHRITIS INVOLVING MULTIPLE JOINTS: ICD-10-CM

## 2023-02-01 DIAGNOSIS — M54.2 NECK PAIN: ICD-10-CM

## 2023-02-02 ENCOUNTER — TELEPHONE (OUTPATIENT)
Dept: ORTHOPEDICS | Facility: CLINIC | Age: 83
End: 2023-02-02
Payer: MEDICARE

## 2023-02-16 ENCOUNTER — PATIENT MESSAGE (OUTPATIENT)
Dept: FAMILY MEDICINE | Facility: CLINIC | Age: 83
End: 2023-02-16

## 2023-02-16 DIAGNOSIS — F41.9 ANXIETY: ICD-10-CM

## 2023-02-16 RX ORDER — ALPRAZOLAM 0.25 MG/1
0.25 TABLET ORAL 2 TIMES DAILY PRN
Qty: 60 TABLET | Refills: 0 | Status: SHIPPED | OUTPATIENT
Start: 2023-02-16 | End: 2023-03-20

## 2023-02-20 DIAGNOSIS — M25.551 RIGHT HIP PAIN: Primary | ICD-10-CM

## 2023-03-02 ENCOUNTER — OFFICE VISIT (OUTPATIENT)
Dept: ORTHOPEDICS | Facility: CLINIC | Age: 83
End: 2023-03-02
Payer: MEDICARE

## 2023-03-02 ENCOUNTER — HOSPITAL ENCOUNTER (OUTPATIENT)
Dept: RADIOLOGY | Facility: HOSPITAL | Age: 83
Discharge: HOME | End: 2023-03-02
Attending: ORTHOPAEDIC SURGERY
Payer: MEDICARE

## 2023-03-02 VITALS — HEIGHT: 63 IN | WEIGHT: 129 LBS | BODY MASS INDEX: 22.86 KG/M2 | RESPIRATION RATE: 18 BRPM

## 2023-03-02 DIAGNOSIS — M25.551 RIGHT HIP PAIN: ICD-10-CM

## 2023-03-02 DIAGNOSIS — M25.551 RIGHT HIP PAIN: Primary | ICD-10-CM

## 2023-03-02 DIAGNOSIS — M54.50 LOW BACK PAIN, UNSPECIFIED BACK PAIN LATERALITY, UNSPECIFIED CHRONICITY, UNSPECIFIED WHETHER SCIATICA PRESENT: ICD-10-CM

## 2023-03-02 PROCEDURE — 99999 PR PBB SHADOW E&M-EST. PATIENT-LVL III: ICD-10-PCS | Mod: PBBFAC,,, | Performed by: ORTHOPAEDIC SURGERY

## 2023-03-02 PROCEDURE — 1159F PR MEDICATION LIST DOCUMENTED IN MEDICAL RECORD: ICD-10-PCS | Mod: CPTII,S$GLB,, | Performed by: ORTHOPAEDIC SURGERY

## 2023-03-02 PROCEDURE — 1160F PR REVIEW ALL MEDS BY PRESCRIBER/CLIN PHARMACIST DOCUMENTED: ICD-10-PCS | Mod: CPTII,S$GLB,, | Performed by: ORTHOPAEDIC SURGERY

## 2023-03-02 PROCEDURE — 99999 PR PBB SHADOW E&M-EST. PATIENT-LVL III: CPT | Mod: PBBFAC,,, | Performed by: ORTHOPAEDIC SURGERY

## 2023-03-02 PROCEDURE — 73502 XR HIP WITH PELVIS WHEN PERFORMED, 2 OR 3  VIEWS RIGHT: ICD-10-PCS | Mod: 26,RT,, | Performed by: RADIOLOGY

## 2023-03-02 PROCEDURE — 99204 OFFICE O/P NEW MOD 45 MIN: CPT | Mod: 25,S$GLB,, | Performed by: ORTHOPAEDIC SURGERY

## 2023-03-02 PROCEDURE — 1160F RVW MEDS BY RX/DR IN RCRD: CPT | Mod: CPTII,S$GLB,, | Performed by: ORTHOPAEDIC SURGERY

## 2023-03-02 PROCEDURE — 1159F MED LIST DOCD IN RCRD: CPT | Mod: CPTII,S$GLB,, | Performed by: ORTHOPAEDIC SURGERY

## 2023-03-02 PROCEDURE — 99204 PR OFFICE/OUTPT VISIT, NEW, LEVL IV, 45-59 MIN: ICD-10-PCS | Mod: 25,S$GLB,, | Performed by: ORTHOPAEDIC SURGERY

## 2023-03-02 PROCEDURE — 1125F PR PAIN SEVERITY QUANTIFIED, PAIN PRESENT: ICD-10-PCS | Mod: CPTII,S$GLB,, | Performed by: ORTHOPAEDIC SURGERY

## 2023-03-02 PROCEDURE — 73502 X-RAY EXAM HIP UNI 2-3 VIEWS: CPT | Mod: 26,RT,, | Performed by: RADIOLOGY

## 2023-03-02 PROCEDURE — 73502 X-RAY EXAM HIP UNI 2-3 VIEWS: CPT | Mod: TC,PN,RT

## 2023-03-02 PROCEDURE — 20610 LARGE JOINT ASPIRATION/INJECTION: R GREATER TROCHANTERIC BURSA: ICD-10-PCS | Mod: RT,S$GLB,, | Performed by: ORTHOPAEDIC SURGERY

## 2023-03-02 PROCEDURE — 1125F AMNT PAIN NOTED PAIN PRSNT: CPT | Mod: CPTII,S$GLB,, | Performed by: ORTHOPAEDIC SURGERY

## 2023-03-02 PROCEDURE — 20610 DRAIN/INJ JOINT/BURSA W/O US: CPT | Mod: RT,S$GLB,, | Performed by: ORTHOPAEDIC SURGERY

## 2023-03-02 RX ORDER — TRIAMCINOLONE ACETONIDE 40 MG/ML
40 INJECTION, SUSPENSION INTRA-ARTICULAR; INTRAMUSCULAR
Status: DISCONTINUED | OUTPATIENT
Start: 2023-03-02 | End: 2023-03-02 | Stop reason: HOSPADM

## 2023-03-02 RX ADMIN — TRIAMCINOLONE ACETONIDE 40 MG: 40 INJECTION, SUSPENSION INTRA-ARTICULAR; INTRAMUSCULAR at 01:03

## 2023-03-02 NOTE — PROCEDURES
Large Joint Aspiration/Injection: R greater trochanteric bursa    Date/Time: 3/2/2023 1:45 PM  Performed by: Sreedhar Sosa MD  Authorized by: Sreedhar Sosa MD     Consent Done?:  Yes (Verbal)  Indications:  Pain  Site marked: the procedure site was marked    Timeout: prior to procedure the correct patient, procedure, and site was verified    Local anesthetic:  Lidocaine 1% without epinephrine and bupivacaine 0.25% without epinephrine  Anesthetic total (ml):  6      Details:  Needle Size:  20 G  Ultrasonic Guidance for needle placement?: No    Approach:  Lateral  Location:  Hip  Site:  R greater trochanteric bursa  Medications:  40 mg triamcinolone acetonide 40 mg/mL  Patient tolerance:  Patient tolerated the procedure well with no immediate complications

## 2023-03-02 NOTE — PROGRESS NOTES
Past Medical History:   Diagnosis Date    Hypertension     Rheumatoid arthritis        History reviewed. No pertinent surgical history.    Current Outpatient Medications   Medication Sig    ALPRAZolam (XANAX) 0.25 MG tablet Take 1 tablet (0.25 mg total) by mouth 2 (two) times daily as needed for Anxiety.    amLODIPine (NORVASC) 5 MG tablet Take 1 tablet (5 mg total) by mouth once daily.    benazepriL (LOTENSIN) 20 MG tablet Take 1 tablet (20 mg total) by mouth once daily.    diclofenac sodium (VOLTAREN) 1 % Gel Apply topically 2 (two) times daily.    famotidine (PEPCID) 20 MG tablet Take 1 tablet (20 mg total) by mouth 2 (two) times daily.    ferrous gluconate (FERGON) 324 MG tablet Take 1 tablet (324 mg total) by mouth daily with breakfast.    ibuprofen (ADVIL,MOTRIN) 200 MG tablet Take 200 mg by mouth every 6 (six) hours as needed for Pain. As needed for hip pain    ondansetron (ZOFRAN-ODT) 4 MG TbDL Take 1 tablet (4 mg total) by mouth every 12 (twelve) hours as needed (nausea).     No current facility-administered medications for this visit.       Review of patient's allergies indicates:  No Known Allergies    History reviewed. No pertinent family history.    Social History     Socioeconomic History    Marital status:    Occupational History    Occupation: retired   Tobacco Use    Smoking status: Never    Smokeless tobacco: Never   Substance and Sexual Activity    Drug use: Never    Sexual activity: Not Currently       Chief Complaint: No chief complaint on file.      History of present illness:  82-year-old female seen for worsening chronic right hip pain.  Pain along the lateral aspect of her hip as well as in the low back and buttock.  Pain when she walks for more than 5 minutes.  Pain does not radiate down the leg.  Pain laterally as well as posteriorly.  Denies any falls.  Also complains of chronic neck and shoulder issues.  Pain is up to an 8/10.  Has tried numerous NSAIDs including Aleve Advil and  naproxen and meloxicam.    Answers submitted by the patient for this visit:  Orthopedics Questionnaire (Submitted on 2/28/2023)  unexpected weight change: No  appetite change : No  sleep disturbance: No  IMMUNOCOMPROMISED: No  nervous/ anxious: Yes  dysphoric mood: No  rash: No  visual disturbance: No  eye redness: No  eye pain: No  ear pain: No  tinnitus: No  hearing loss: No  sinus pressure : No  nosebleeds: No  enviro allergies: No  food allergies: No  cough: No  shortness of breath: No  sweating: No  dysuria: No  frequency: No  difficulty urinating: No  hematuria: No  painful intercourse: No  chest pain: No  palpitations: No  nausea: No  vomiting: No  diarrhea: No  blood in stool: No  constipation: Yes  headaches: No  dizziness: No  numbness: No  seizures: No  joint swelling: Yes  myalgia: No  weakness: No  back pain: Yes   (Submitted on 2/28/2023)  Chief Complaint: Hip pain  Pain Chronicity: chronic  History of trauma: No  Onset: more than 1 year ago  Frequency: intermittently  Progression since onset: gradually worsening  Injury mechanism: reaching  injury location: at home  pain- numeric: 8/10  pain location: left shoulder, right shoulder, right hip  pain quality: aching  Radiating Pain: No  Aggravating factors: activity, walking, lifting, sitting  fever: No  inability to bear weight: Yes  itching: No  joint locking: No  limited range of motion: Yes  stiffness: No  tingling: No  Treatments tried: heat, NSAIDs, rest, other  physical therapy: not tried  Improvement on treatment: no relief        Physical Examination:    Vital Signs:  There were no vitals filed for this visit.    There is no height or weight on file to calculate BMI.    This a well-developed, well nourished patient in no acute distress.  They are alert and oriented and cooperative to examination.  Pt. walks with a very slow gait.    Examination of the patient's right hip shows full range of motion with flexion to 160°, extension to 0, external  rotation to 50°, internal rotation of 15°, abduction of 50°, adduction of 15°. Skin has no rashes or bruising. Patient has negative Stinchfield exam. Patient has negative straight leg raise.Negative internal impingement test. Negative FANTA test. Negative Lonnie's test. Patient has no pain with hip range of motion.  Moderately tender to palpation over the greater trochanteric bursa.  Tender over the buttock and lumbar spine as well.  Patient is 5 out of 5 motor strength, palpable distal pulses, and intact light touch sensation.       X-rays:  X-rays of the right hip are ordered and reviewed which show some minimal degenerative change     Assessment::  Right trochanteric bursitis   Chronic back pain    Plan:  Reviewed the findings with her and her daughter today.  I did agree to give her a trochanteric bursal injection to hopefully help diminish some of the pain.  Really recommended physical therapy to work on her back and hips.  Patient could benefit from some stretching and strengthening.  Patient would likely benefit ultimately from pain or PM&R if PT is not successful.    This note was created using [employer] voice recognition software that occasionally misinterpreted phrases or words.    Consult note is delivered via Epic messaging service.

## 2023-04-24 ENCOUNTER — LAB VISIT (OUTPATIENT)
Dept: LAB | Facility: HOSPITAL | Age: 83
End: 2023-04-24
Attending: NURSE PRACTITIONER
Payer: MEDICARE

## 2023-04-24 DIAGNOSIS — F41.9 ANXIETY: ICD-10-CM

## 2023-04-24 DIAGNOSIS — I10 ESSENTIAL HYPERTENSION: ICD-10-CM

## 2023-04-24 DIAGNOSIS — N18.31 STAGE 3A CHRONIC KIDNEY DISEASE: ICD-10-CM

## 2023-04-24 DIAGNOSIS — D50.8 IRON DEFICIENCY ANEMIA SECONDARY TO INADEQUATE DIETARY IRON INTAKE: ICD-10-CM

## 2023-04-24 DIAGNOSIS — E78.2 MIXED HYPERLIPIDEMIA: ICD-10-CM

## 2023-04-24 LAB
ALBUMIN SERPL BCP-MCNC: 3.8 G/DL (ref 3.5–5.2)
ALBUMIN/CREAT UR: 112.6 UG/MG (ref 0–30)
ALP SERPL-CCNC: 58 U/L (ref 55–135)
ALT SERPL W/O P-5'-P-CCNC: 11 U/L (ref 10–44)
ANION GAP SERPL CALC-SCNC: 9 MMOL/L (ref 8–16)
AST SERPL-CCNC: 16 U/L (ref 10–40)
BACTERIA #/AREA URNS HPF: NEGATIVE /HPF
BASOPHILS # BLD AUTO: 0.02 K/UL (ref 0–0.2)
BASOPHILS NFR BLD: 0.3 % (ref 0–1.9)
BILIRUB SERPL-MCNC: 0.4 MG/DL (ref 0.1–1)
BILIRUB UR QL STRIP: NEGATIVE
BUN SERPL-MCNC: 18 MG/DL (ref 8–23)
CALCIUM SERPL-MCNC: 9.4 MG/DL (ref 8.7–10.5)
CHLORIDE SERPL-SCNC: 107 MMOL/L (ref 95–110)
CHOLEST SERPL-MCNC: 190 MG/DL (ref 120–199)
CHOLEST/HDLC SERPL: 3.7 {RATIO} (ref 2–5)
CLARITY UR: ABNORMAL
CO2 SERPL-SCNC: 23 MMOL/L (ref 23–29)
COLOR UR: YELLOW
CREAT SERPL-MCNC: 1.1 MG/DL (ref 0.5–1.4)
CREAT UR-MCNC: 129 MG/DL (ref 15–325)
DIFFERENTIAL METHOD: ABNORMAL
EOSINOPHIL # BLD AUTO: 0.1 K/UL (ref 0–0.5)
EOSINOPHIL NFR BLD: 2.2 % (ref 0–8)
ERYTHROCYTE [DISTWIDTH] IN BLOOD BY AUTOMATED COUNT: 17.4 % (ref 11.5–14.5)
EST. GFR  (NO RACE VARIABLE): 49.5 ML/MIN/1.73 M^2
FERRITIN SERPL-MCNC: 10 NG/ML (ref 20–300)
GLUCOSE SERPL-MCNC: 93 MG/DL (ref 70–110)
GLUCOSE UR QL STRIP: NEGATIVE
HCT VFR BLD AUTO: 39.7 % (ref 37–48.5)
HDLC SERPL-MCNC: 51 MG/DL (ref 40–75)
HDLC SERPL: 26.8 % (ref 20–50)
HGB BLD-MCNC: 11.6 G/DL (ref 12–16)
HGB UR QL STRIP: ABNORMAL
HYALINE CASTS #/AREA URNS LPF: 1 /LPF
IMM GRANULOCYTES # BLD AUTO: 0.02 K/UL (ref 0–0.04)
IMM GRANULOCYTES NFR BLD AUTO: 0.3 % (ref 0–0.5)
IRON SERPL-MCNC: 30 UG/DL (ref 30–160)
KETONES UR QL STRIP: NEGATIVE
LDLC SERPL CALC-MCNC: 121.2 MG/DL (ref 63–159)
LEUKOCYTE ESTERASE UR QL STRIP: ABNORMAL
LYMPHOCYTES # BLD AUTO: 2.3 K/UL (ref 1–4.8)
LYMPHOCYTES NFR BLD: 35.2 % (ref 18–48)
MCH RBC QN AUTO: 25.2 PG (ref 27–31)
MCHC RBC AUTO-ENTMCNC: 29.2 G/DL (ref 32–36)
MCV RBC AUTO: 86 FL (ref 82–98)
MICROALBUMIN UR DL<=1MG/L-MCNC: 145.3 UG/ML
MICROSCOPIC COMMENT: NORMAL
MONOCYTES # BLD AUTO: 0.4 K/UL (ref 0.3–1)
MONOCYTES NFR BLD: 6.6 % (ref 4–15)
NEUTROPHILS # BLD AUTO: 3.6 K/UL (ref 1.8–7.7)
NEUTROPHILS NFR BLD: 55.4 % (ref 38–73)
NITRITE UR QL STRIP: POSITIVE
NONHDLC SERPL-MCNC: 139 MG/DL
NRBC BLD-RTO: 0 /100 WBC
PH UR STRIP: 6 [PH] (ref 5–8)
PLATELET # BLD AUTO: 227 K/UL (ref 150–450)
PMV BLD AUTO: 11.3 FL (ref 9.2–12.9)
POTASSIUM SERPL-SCNC: 4 MMOL/L (ref 3.5–5.1)
PROT SERPL-MCNC: 6.8 G/DL (ref 6–8.4)
PROT UR QL STRIP: ABNORMAL
RBC # BLD AUTO: 4.61 M/UL (ref 4–5.4)
RBC #/AREA URNS HPF: 0 /HPF (ref 0–4)
SATURATED IRON: 8 % (ref 20–50)
SODIUM SERPL-SCNC: 139 MMOL/L (ref 136–145)
SP GR UR STRIP: 1.02 (ref 1–1.03)
SQUAMOUS #/AREA URNS HPF: 1 /HPF
TOTAL IRON BINDING CAPACITY: 385 UG/DL (ref 250–450)
TRANSFERRIN SERPL-MCNC: 275 MG/DL (ref 200–375)
TRIGL SERPL-MCNC: 89 MG/DL (ref 30–150)
TSH SERPL DL<=0.005 MIU/L-ACNC: 1.3 UIU/ML (ref 0.34–5.6)
URN SPEC COLLECT METH UR: ABNORMAL
UROBILINOGEN UR STRIP-ACNC: NEGATIVE EU/DL
WBC # BLD AUTO: 6.47 K/UL (ref 3.9–12.7)
WBC #/AREA URNS HPF: 1 /HPF (ref 0–5)

## 2023-04-24 PROCEDURE — 85025 COMPLETE CBC W/AUTO DIFF WBC: CPT | Performed by: NURSE PRACTITIONER

## 2023-04-24 PROCEDURE — 82570 ASSAY OF URINE CREATININE: CPT | Performed by: NURSE PRACTITIONER

## 2023-04-24 PROCEDURE — 80053 COMPREHEN METABOLIC PANEL: CPT | Performed by: NURSE PRACTITIONER

## 2023-04-24 PROCEDURE — 82728 ASSAY OF FERRITIN: CPT | Performed by: NURSE PRACTITIONER

## 2023-04-24 PROCEDURE — 84466 ASSAY OF TRANSFERRIN: CPT | Performed by: NURSE PRACTITIONER

## 2023-04-24 PROCEDURE — 84443 ASSAY THYROID STIM HORMONE: CPT | Performed by: NURSE PRACTITIONER

## 2023-04-24 PROCEDURE — 80061 LIPID PANEL: CPT | Performed by: NURSE PRACTITIONER

## 2023-04-24 PROCEDURE — 36415 COLL VENOUS BLD VENIPUNCTURE: CPT | Performed by: NURSE PRACTITIONER

## 2023-04-24 PROCEDURE — 81001 URINALYSIS AUTO W/SCOPE: CPT | Performed by: NURSE PRACTITIONER

## 2023-05-03 ENCOUNTER — OFFICE VISIT (OUTPATIENT)
Dept: FAMILY MEDICINE | Facility: CLINIC | Age: 83
End: 2023-05-03
Payer: MEDICARE

## 2023-05-03 VITALS
OXYGEN SATURATION: 96 % | WEIGHT: 121.81 LBS | TEMPERATURE: 98 F | BODY MASS INDEX: 21.58 KG/M2 | SYSTOLIC BLOOD PRESSURE: 138 MMHG | DIASTOLIC BLOOD PRESSURE: 84 MMHG | HEIGHT: 63 IN | HEART RATE: 91 BPM

## 2023-05-03 DIAGNOSIS — N39.0 URINARY TRACT INFECTION WITHOUT HEMATURIA, SITE UNSPECIFIED: Primary | ICD-10-CM

## 2023-05-03 DIAGNOSIS — I10 ESSENTIAL HYPERTENSION: ICD-10-CM

## 2023-05-03 DIAGNOSIS — E78.2 MIXED HYPERLIPIDEMIA: ICD-10-CM

## 2023-05-03 DIAGNOSIS — G89.29 CHRONIC LOW BACK PAIN, UNSPECIFIED BACK PAIN LATERALITY, UNSPECIFIED WHETHER SCIATICA PRESENT: ICD-10-CM

## 2023-05-03 DIAGNOSIS — M25.551 RIGHT HIP PAIN: ICD-10-CM

## 2023-05-03 DIAGNOSIS — D50.8 IRON DEFICIENCY ANEMIA SECONDARY TO INADEQUATE DIETARY IRON INTAKE: ICD-10-CM

## 2023-05-03 DIAGNOSIS — F41.9 ANXIETY: ICD-10-CM

## 2023-05-03 DIAGNOSIS — M54.50 CHRONIC LOW BACK PAIN, UNSPECIFIED BACK PAIN LATERALITY, UNSPECIFIED WHETHER SCIATICA PRESENT: ICD-10-CM

## 2023-05-03 PROCEDURE — 99214 PR OFFICE/OUTPT VISIT, EST, LEVL IV, 30-39 MIN: ICD-10-PCS | Mod: ,,, | Performed by: NURSE PRACTITIONER

## 2023-05-03 PROCEDURE — 1159F PR MEDICATION LIST DOCUMENTED IN MEDICAL RECORD: ICD-10-PCS | Mod: CPTII,,, | Performed by: NURSE PRACTITIONER

## 2023-05-03 PROCEDURE — 3075F PR MOST RECENT SYSTOLIC BLOOD PRESS GE 130-139MM HG: ICD-10-PCS | Mod: CPTII,,, | Performed by: NURSE PRACTITIONER

## 2023-05-03 PROCEDURE — 3079F DIAST BP 80-89 MM HG: CPT | Mod: CPTII,,, | Performed by: NURSE PRACTITIONER

## 2023-05-03 PROCEDURE — 1160F PR REVIEW ALL MEDS BY PRESCRIBER/CLIN PHARMACIST DOCUMENTED: ICD-10-PCS | Mod: CPTII,,, | Performed by: NURSE PRACTITIONER

## 2023-05-03 PROCEDURE — 1159F MED LIST DOCD IN RCRD: CPT | Mod: CPTII,,, | Performed by: NURSE PRACTITIONER

## 2023-05-03 PROCEDURE — 3079F PR MOST RECENT DIASTOLIC BLOOD PRESSURE 80-89 MM HG: ICD-10-PCS | Mod: CPTII,,, | Performed by: NURSE PRACTITIONER

## 2023-05-03 PROCEDURE — 3288F FALL RISK ASSESSMENT DOCD: CPT | Mod: CPTII,,, | Performed by: NURSE PRACTITIONER

## 2023-05-03 PROCEDURE — 1160F RVW MEDS BY RX/DR IN RCRD: CPT | Mod: CPTII,,, | Performed by: NURSE PRACTITIONER

## 2023-05-03 PROCEDURE — 1101F PR PT FALLS ASSESS DOC 0-1 FALLS W/OUT INJ PAST YR: ICD-10-PCS | Mod: CPTII,,, | Performed by: NURSE PRACTITIONER

## 2023-05-03 PROCEDURE — 3288F PR FALLS RISK ASSESSMENT DOCUMENTED: ICD-10-PCS | Mod: CPTII,,, | Performed by: NURSE PRACTITIONER

## 2023-05-03 PROCEDURE — 99214 OFFICE O/P EST MOD 30 MIN: CPT | Mod: ,,, | Performed by: NURSE PRACTITIONER

## 2023-05-03 PROCEDURE — 3075F SYST BP GE 130 - 139MM HG: CPT | Mod: CPTII,,, | Performed by: NURSE PRACTITIONER

## 2023-05-03 PROCEDURE — 99214 OFFICE O/P EST MOD 30 MIN: CPT | Performed by: NURSE PRACTITIONER

## 2023-05-03 PROCEDURE — 1101F PT FALLS ASSESS-DOCD LE1/YR: CPT | Mod: CPTII,,, | Performed by: NURSE PRACTITIONER

## 2023-05-03 RX ORDER — CIPROFLOXACIN 250 MG/1
250 TABLET, FILM COATED ORAL 2 TIMES DAILY
Qty: 6 TABLET | Refills: 0 | Status: SHIPPED | OUTPATIENT
Start: 2023-05-03 | End: 2023-05-06

## 2023-05-03 RX ORDER — AMLODIPINE BESYLATE 10 MG/1
10 TABLET ORAL DAILY
Qty: 90 TABLET | Refills: 1 | Status: SHIPPED | OUTPATIENT
Start: 2023-05-03 | End: 2023-07-27 | Stop reason: SDUPTHER

## 2023-05-03 NOTE — PROGRESS NOTES
SUBJECTIVE:      Patient ID: Martha Alonso is a 82 y.o. female.    Chief Complaint: Urinary Tract Infection    82-year-old female presents to the clinic with complaints of a UTI.  She is present with her daughter. Labs were completed last month, which were reviewed.    Patient reports increased frequency and dysuria.  Patient has been using AZO.  UA completed on 04/24 indicated a UTI.  Message was sent to call patient to see if she was symptomatic, however, patient was not called about her results.  No recent antibiotics.  Patient denies fever and flank pain.     Patient has followed up with the orthopedist, Dr. Sosa, for chronic right hip pain and lower back pain.  He suspected bursitis and received a trochanteric bursal injection. Symptomatic relieve was brief.  She started PT for her back and hip, which has been helping. No longer taking meloxicam.    She does have some iron deficiency anemia, no longer taking iron supplements.  Ferritin 10, saturated iron 8, Hgb 11.6 and Hct 39.7.      There was a slight decline in eGFR at 49.5.  Urine microalbumin 145.3 and microalbumin/creatinine ratio 112.6.  She does not have diabetes.  Blood pressure is elevated today and needs better control.      Cholesterol is stable and TSH was wnl.       Anxiety is stable with Xanax.  I have offered additional medication to help with anxiety in the past, but patient states it is manageable.  Her mood is stable today. Denies SI.    Anxiety  Presents for follow-up visit. Symptoms include excessive worry, nervous/anxious behavior and restlessness. Patient reports no chest pain, confusion, dizziness, hyperventilation, irritability, nausea, palpitations or shortness of breath. Symptoms occur most days. The severity of symptoms is mild. The quality of sleep is good. Nighttime awakenings: occasional.     Compliance with medications is %. Treatment side effects: None.   Hypertension  This is a chronic problem. The current episode  started more than 1 year ago. The problem is resistant. Associated symptoms include anxiety. Pertinent negatives include no blurred vision, chest pain, headaches, malaise/fatigue, neck pain, orthopnea, palpitations, peripheral edema, PND, shortness of breath or sweats. There are no associated agents to hypertension. Risk factors for coronary artery disease include dyslipidemia, sedentary lifestyle and post-menopausal state. Past treatments include ACE inhibitors and calcium channel blockers. The current treatment provides moderate improvement. There are no compliance problems.  Identifiable causes of hypertension include chronic renal disease. There is no history of a thyroid problem.   Hyperlipidemia  This is a chronic problem. The current episode started more than 1 year ago. Recent lipid tests were reviewed and are variable. Exacerbating diseases include chronic renal disease. She has no history of diabetes, hypothyroidism, liver disease, obesity or nephrotic syndrome. Factors aggravating her hyperlipidemia include fatty foods. Pertinent negatives include no chest pain, focal sensory loss, focal weakness, leg pain, myalgias or shortness of breath. Current antihyperlipidemic treatment includes diet change. The current treatment provides moderate improvement of lipids. There are no compliance problems.  Risk factors for coronary artery disease include dyslipidemia, hypertension and post-menopausal.     History reviewed. No pertinent family history.   Social History     Socioeconomic History    Marital status:    Occupational History    Occupation: retired   Tobacco Use    Smoking status: Never    Smokeless tobacco: Never   Substance and Sexual Activity    Drug use: Never    Sexual activity: Not Currently     Current Outpatient Medications   Medication Sig Dispense Refill    ALPRAZolam (XANAX) 0.25 MG tablet TAKE 1 TABLET(0.25 MG) BY MOUTH TWICE DAILY AS NEEDED FOR ANXIETY 60 tablet 0    benazepriL (LOTENSIN)  20 MG tablet TAKE 1 TABLET(20 MG) BY MOUTH EVERY DAY 90 tablet 1    diclofenac sodium (VOLTAREN) 1 % Gel Apply topically 2 (two) times daily. 450 g 3    famotidine (PEPCID) 20 MG tablet Take 1 tablet (20 mg total) by mouth 2 (two) times daily. 180 tablet 1    ferrous gluconate (FERGON) 324 MG tablet Take 1 tablet (324 mg total) by mouth daily with breakfast. 90 tablet 0    ibuprofen (ADVIL,MOTRIN) 200 MG tablet Take 200 mg by mouth every 6 (six) hours as needed for Pain. As needed for hip pain      ondansetron (ZOFRAN-ODT) 4 MG TbDL Take 1 tablet (4 mg total) by mouth every 12 (twelve) hours as needed (nausea). 21 tablet 0    amLODIPine (NORVASC) 10 MG tablet Take 1 tablet (10 mg total) by mouth once daily. 90 tablet 1    ciprofloxacin HCl (CIPRO) 250 MG tablet Take 1 tablet (250 mg total) by mouth 2 (two) times daily. for 3 days 6 tablet 0     No current facility-administered medications for this visit.     Review of patient's allergies indicates:  No Known Allergies   Past Medical History:   Diagnosis Date    Hypertension     Rheumatoid arthritis      History reviewed. No pertinent surgical history.    Review of Systems   Constitutional:  Negative for activity change, appetite change, chills, diaphoresis, fatigue, fever, irritability, malaise/fatigue and unexpected weight change.   HENT:  Negative for congestion, ear pain, sinus pressure, sore throat, trouble swallowing and voice change.    Eyes:  Negative for blurred vision, pain, discharge and visual disturbance.   Respiratory:  Negative for cough, chest tightness, shortness of breath and wheezing.    Cardiovascular:  Negative for chest pain, palpitations, orthopnea and PND.        Hypertension    Gastrointestinal:  Negative for abdominal pain, constipation, diarrhea, nausea and vomiting.   Genitourinary:  Positive for dysuria and frequency. Negative for difficulty urinating, flank pain, hematuria, urgency, vaginal bleeding, vaginal discharge and vaginal pain.  "  Musculoskeletal:  Positive for arthralgias and gait problem. Negative for back pain, joint swelling, myalgias and neck pain.        Right hip pain   Skin:  Negative for color change and rash.   Neurological:  Negative for dizziness, focal weakness, seizures, syncope, weakness, numbness and headaches.   Hematological:  Negative for adenopathy. Does not bruise/bleed easily.        Anemia   Psychiatric/Behavioral:  Negative for confusion, dysphoric mood and sleep disturbance. The patient is nervous/anxious.     OBJECTIVE:      Vitals:    05/03/23 0950 05/03/23 1141   BP: (!) 143/81 138/84   BP Location: Right arm    Patient Position: Sitting    BP Method: Medium (Automatic)    Pulse: 91    Temp: 98.4 °F (36.9 °C)    TempSrc: Oral    SpO2: 96%    Weight: 55.2 kg (121 lb 12.8 oz)    Height: 5' 3" (1.6 m)      Physical Exam  Vitals and nursing note reviewed.   Constitutional:       General: She is awake. She is not in acute distress.     Appearance: Normal appearance. She is well-groomed and normal weight. She is not ill-appearing, toxic-appearing or diaphoretic.   HENT:      Head: Normocephalic and atraumatic.      Nose: Nose normal.   Eyes:      General: Lids are normal. Gaze aligned appropriately.      Conjunctiva/sclera: Conjunctivae normal.      Right eye: Right conjunctiva is not injected.      Left eye: Left conjunctiva is not injected.      Pupils: Pupils are equal, round, and reactive to light.   Cardiovascular:      Rate and Rhythm: Normal rate and regular rhythm.      Pulses: Normal pulses.      Heart sounds: Normal heart sounds, S1 normal and S2 normal. No murmur heard.    No friction rub. No gallop.   Pulmonary:      Effort: Pulmonary effort is normal. No respiratory distress.      Breath sounds: Normal breath sounds. No stridor. No decreased breath sounds, wheezing, rhonchi or rales.   Chest:      Chest wall: No tenderness.   Musculoskeletal:      Cervical back: Neck supple.      Right lower leg: No edema. "      Left lower leg: No edema.   Lymphadenopathy:      Cervical: No cervical adenopathy.   Skin:     General: Skin is warm and dry.      Capillary Refill: Capillary refill takes less than 2 seconds.      Findings: No erythema or rash.   Neurological:      Mental Status: She is alert and oriented to person, place, and time. Mental status is at baseline.   Psychiatric:         Attention and Perception: Attention normal.         Mood and Affect: Mood normal.         Speech: Speech normal.         Behavior: Behavior normal. Behavior is cooperative.         Thought Content: Thought content normal.         Judgment: Judgment normal.      Assessment:       1. Urinary tract infection without hematuria, site unspecified    2. Essential hypertension    3. Mixed hyperlipidemia    4. Anxiety    5. Iron deficiency anemia secondary to inadequate dietary iron intake    6. Right hip pain    7. Chronic low back pain, unspecified back pain laterality, unspecified whether sciatica present        Plan:       Urinary tract infection without hematuria, site unspecified  Start Cipro for UTI.  She took Cipro a few years go and tolerated well. Increase fluids. Do not postpone voiding, void frequently. Cranberry tablets may help. Avoid/limit use of pads.  -     ciprofloxacin HCl (CIPRO) 250 MG tablet; Take 1 tablet (250 mg total) by mouth 2 (two) times daily. for 3 days  Dispense: 6 tablet; Refill: 0    Essential hypertension  I suspect the increase in microalbumin and microalbumin creatinine ratio are related to her blood pressure.  Amlodipine increased for better blood pressure control. Discussed monitoring for peripheral edema as a side effect. Continue benazepril 20 mg.  Low sodium/low fat diet.   -     amLODIPine (NORVASC) 10 MG tablet; Take 1 tablet (10 mg total) by mouth once daily.  Dispense: 90 tablet; Refill: 1    Mixed hyperlipidemia  Stable with diet.  Limit red meat, butter, fried foods, cheese, and other foods that have a lot  of saturated fat. Consume more: lean meats, fish, fruits, vegetables, whole grains, beans, lentils, and nuts.      Anxiety  Stable with Xanax prn. Continue current treatment.     Iron deficiency anemia secondary to inadequate dietary iron intake  Restart iron supplement.  If constipation occurs take a daily stool softener or take medication every other day.      Right hip pain  Managed by Ortho, continue PT.     Chronic low back pain, unspecified back pain laterality, unspecified whether sciatica present  Managed by Ortho, continue PT.     This note was created using [employer] voice recognition software that occasionally misinterprets phrases or words.     I spent a total of 34 minutes on the day of the visit.This includes face to face time and non-face to face time preparing to see the patient (eg, review of tests), obtaining and/or reviewing separately obtained history, documenting clinical information in the electronic or other health record, independently interpreting results and communicating results to the patient/family/caregiver, or care coordinator.    Follow up in about 3 months (around 8/3/2023) for Anxiety .      5/3/2023 ELINA Johnston, AARONP

## 2023-05-09 DIAGNOSIS — F41.9 ANXIETY: ICD-10-CM

## 2023-05-10 RX ORDER — ALPRAZOLAM 0.25 MG/1
TABLET ORAL
Qty: 60 TABLET | Refills: 0 | Status: SHIPPED | OUTPATIENT
Start: 2023-05-10 | End: 2023-06-14

## 2023-06-14 DIAGNOSIS — F41.9 ANXIETY: ICD-10-CM

## 2023-06-14 RX ORDER — ALPRAZOLAM 0.25 MG/1
TABLET ORAL
Qty: 60 TABLET | Refills: 0 | Status: SHIPPED | OUTPATIENT
Start: 2023-06-14 | End: 2023-07-27 | Stop reason: SDUPTHER

## 2023-07-27 ENCOUNTER — OFFICE VISIT (OUTPATIENT)
Dept: FAMILY MEDICINE | Facility: CLINIC | Age: 83
End: 2023-07-27
Payer: MEDICARE

## 2023-07-27 DIAGNOSIS — M25.551 CHRONIC RIGHT HIP PAIN: ICD-10-CM

## 2023-07-27 DIAGNOSIS — I10 ESSENTIAL HYPERTENSION: ICD-10-CM

## 2023-07-27 DIAGNOSIS — E78.2 MIXED HYPERLIPIDEMIA: ICD-10-CM

## 2023-07-27 DIAGNOSIS — D50.8 IRON DEFICIENCY ANEMIA SECONDARY TO INADEQUATE DIETARY IRON INTAKE: ICD-10-CM

## 2023-07-27 DIAGNOSIS — F41.9 ANXIETY: Primary | ICD-10-CM

## 2023-07-27 DIAGNOSIS — G89.29 CHRONIC RIGHT HIP PAIN: ICD-10-CM

## 2023-07-27 DIAGNOSIS — M15.9 PRIMARY OSTEOARTHRITIS INVOLVING MULTIPLE JOINTS: ICD-10-CM

## 2023-07-27 PROCEDURE — 99214 OFFICE O/P EST MOD 30 MIN: CPT | Mod: 95,,, | Performed by: NURSE PRACTITIONER

## 2023-07-27 PROCEDURE — 1159F PR MEDICATION LIST DOCUMENTED IN MEDICAL RECORD: ICD-10-PCS | Mod: CPTII,95,, | Performed by: NURSE PRACTITIONER

## 2023-07-27 PROCEDURE — 1160F PR REVIEW ALL MEDS BY PRESCRIBER/CLIN PHARMACIST DOCUMENTED: ICD-10-PCS | Mod: CPTII,95,, | Performed by: NURSE PRACTITIONER

## 2023-07-27 PROCEDURE — 1160F RVW MEDS BY RX/DR IN RCRD: CPT | Mod: CPTII,95,, | Performed by: NURSE PRACTITIONER

## 2023-07-27 PROCEDURE — 1159F MED LIST DOCD IN RCRD: CPT | Mod: CPTII,95,, | Performed by: NURSE PRACTITIONER

## 2023-07-27 PROCEDURE — 99214 PR OFFICE/OUTPT VISIT, EST, LEVL IV, 30-39 MIN: ICD-10-PCS | Mod: 95,,, | Performed by: NURSE PRACTITIONER

## 2023-07-27 RX ORDER — AMLODIPINE BESYLATE 10 MG/1
10 TABLET ORAL DAILY
Qty: 90 TABLET | Refills: 1 | Status: SHIPPED | OUTPATIENT
Start: 2023-07-27 | End: 2023-10-26 | Stop reason: SDUPTHER

## 2023-07-27 RX ORDER — FERROUS GLUCONATE 324(38)MG
324 TABLET ORAL
Qty: 90 TABLET | Refills: 1 | COMMUNITY
Start: 2023-07-27 | End: 2023-10-23 | Stop reason: SDUPTHER

## 2023-07-27 RX ORDER — ALPRAZOLAM 0.25 MG/1
0.25 TABLET ORAL 2 TIMES DAILY PRN
Qty: 60 TABLET | Refills: 0 | Status: SHIPPED | OUTPATIENT
Start: 2023-07-27 | End: 2023-09-05

## 2023-07-27 RX ORDER — BENAZEPRIL HYDROCHLORIDE 20 MG/1
20 TABLET ORAL DAILY
Qty: 90 TABLET | Refills: 1 | Status: SHIPPED | OUTPATIENT
Start: 2023-07-27 | End: 2023-10-26 | Stop reason: SDUPTHER

## 2023-07-27 RX ORDER — DICLOFENAC SODIUM 10 MG/G
GEL TOPICAL 2 TIMES DAILY
Qty: 450 G | Refills: 3 | Status: SHIPPED | OUTPATIENT
Start: 2023-07-27 | End: 2023-10-26 | Stop reason: SDUPTHER

## 2023-07-27 NOTE — PROGRESS NOTES
Subjective:        The chief complaint leading to consultation is: anxiety  The patient location is:  Home  Visit type: Virtual visit with synchronous audio/video or audio only  This was a video visit in lieu of in-person visit due to the coronavirus emergency. Patient acknowledged and consented to the video visit encounter.     82-year-old female presents via virtual visit for anxiety follow-up. She is unable to meet in person because her daughter, who brings her to appointments, is receiving chemo for lymphoma.      Anxiety is stable with Xanax.  I have offered additional medication to help with anxiety in the past, but patient states it is manageable.  Her mood is stable today. Denies SI.    Her blood pressure has been stable at home. She is taking it daily. Reports BP is 120/80's.  Reports compliance amlodipine 10 mg and benazepril 20 mg daily.     She continues to have have hip pain and is followed by Ortho.  Unable to do PT because she does not have a ride at this time. She uses Voltaren gel for pain.       No past surgical history on file.  Past Medical History:   Diagnosis Date    Hypertension     Rheumatoid arthritis      History reviewed. No pertinent family history.     Social History:   Marital Status:   Alcohol History:  has no history on file for alcohol use.  Tobacco History:  reports that she has never smoked. She has never used smokeless tobacco.  Drug History:  reports no history of drug use.    Review of patient's allergies indicates:  No Known Allergies    Current Outpatient Medications   Medication Sig Dispense Refill    ibuprofen (ADVIL,MOTRIN) 200 MG tablet Take 200 mg by mouth every 6 (six) hours as needed for Pain. As needed for hip pain      ondansetron (ZOFRAN-ODT) 4 MG TbDL Take 1 tablet (4 mg total) by mouth every 12 (twelve) hours as needed (nausea). 21 tablet 0    ALPRAZolam (XANAX) 0.25 MG tablet Take 1 tablet (0.25 mg total) by mouth 2 (two) times daily as needed for  Anxiety. 60 tablet 0    amLODIPine (NORVASC) 10 MG tablet Take 1 tablet (10 mg total) by mouth once daily. 90 tablet 1    benazepriL (LOTENSIN) 20 MG tablet Take 1 tablet (20 mg total) by mouth once daily. 90 tablet 1    diclofenac sodium (VOLTAREN) 1 % Gel Apply topically 2 (two) times daily. 450 g 3    famotidine (PEPCID) 20 MG tablet Take 1 tablet (20 mg total) by mouth 2 (two) times daily. 180 tablet 1    ferrous gluconate (FERGON) 324 MG tablet Take 1 tablet (324 mg total) by mouth daily with breakfast. 90 tablet 1     No current facility-administered medications for this visit.       Review of Systems   Constitutional:  Negative for activity change, appetite change, chills, diaphoresis, fatigue, fever and unexpected weight change.   HENT:  Negative for hearing loss, rhinorrhea and trouble swallowing.    Eyes:  Negative for discharge and visual disturbance.   Respiratory:  Negative for chest tightness and wheezing.    Cardiovascular:  Negative for chest pain and palpitations.   Gastrointestinal:  Negative for blood in stool, constipation, diarrhea and vomiting.   Endocrine: Negative for polydipsia and polyuria.   Genitourinary:  Negative for difficulty urinating, dysuria, hematuria and menstrual problem.   Musculoskeletal:  Positive for arthralgias and neck pain. Negative for joint swelling.   Neurological:  Negative for weakness and headaches.   Psychiatric/Behavioral:  Negative for confusion and dysphoric mood. The patient is nervous/anxious.        Objective:        Physical Exam:   Physical Exam  Vitals and nursing note reviewed.   Constitutional:       General: She is awake. She is not in acute distress.     Appearance: Normal appearance. She is not ill-appearing or toxic-appearing.   HENT:      Head: Normocephalic and atraumatic.   Pulmonary:      Effort: Pulmonary effort is normal.   Neurological:      Mental Status: She is alert.   Psychiatric:         Mood and Affect: Mood normal.         Behavior:  Behavior normal. Behavior is cooperative.         Thought Content: Thought content normal.         Judgment: Judgment normal.            Assessment:       1. Anxiety    2. Essential hypertension    3. Chronic right hip pain    4. Primary osteoarthritis involving multiple joints    5. Mixed hyperlipidemia    6. Iron deficiency anemia secondary to inadequate dietary iron intake      Plan:   Anxiety  Stable with Xanax prn. Continue current treatment.   -     ALPRAZolam (XANAX) 0.25 MG tablet; Take 1 tablet (0.25 mg total) by mouth 2 (two) times daily as needed for Anxiety.  Dispense: 60 tablet; Refill: 0    Essential hypertension  Stable with benazepril 20 mg and Norvasc 10 mg, low sodium diet, continue home BP monitoring.   -     Lipid Panel; Future; Expected date: 07/27/2023  -     Comprehensive Metabolic Panel; Future; Expected date: 07/27/2023  -     amLODIPine (NORVASC) 10 MG tablet; Take 1 tablet (10 mg total) by mouth once daily.  Dispense: 90 tablet; Refill: 1  -     benazepriL (LOTENSIN) 20 MG tablet; Take 1 tablet (20 mg total) by mouth once daily.  Dispense: 90 tablet; Refill: 1    Chronic right hip pain  Use Voltaren gel prn, follow-up with Ortho.   -     diclofenac sodium (VOLTAREN) 1 % Gel; Apply topically 2 (two) times daily.  Dispense: 450 g; Refill: 3    Primary osteoarthritis involving multiple joints  Use Voltaren gel prn.   -     diclofenac sodium (VOLTAREN) 1 % Gel; Apply topically 2 (two) times daily.  Dispense: 450 g; Refill: 3    Mixed hyperlipidemia  Limit red meat, butter, fried foods, cheese, and other foods that have a lot of saturated fat. Consume more: lean meats, fish, fruits, vegetables, whole grains, beans, lentils, and nuts.    -     Lipid Panel; Future; Expected date: 07/27/2023  -     Comprehensive Metabolic Panel; Future; Expected date: 07/27/2023    Iron deficiency anemia secondary to inadequate dietary iron intake  Continue Iron supplements.   -     CBC Auto Differential; Future;  Expected date: 07/27/2023  -     Ferritin; Future; Expected date: 07/27/2023  -     Iron and TIBC; Future; Expected date: 07/27/2023  -     ferrous gluconate (FERGON) 324 MG tablet; Take 1 tablet (324 mg total) by mouth daily with breakfast.  Dispense: 90 tablet; Refill: 1    Follow up in about 3 months (around 10/27/2023) for Anxiety.    Total time spent with patient: 15 min    This note was created using [employer] voice recognition software that occasionally misinterprets phrases or words.

## 2023-09-03 DIAGNOSIS — F41.9 ANXIETY: ICD-10-CM

## 2023-09-05 RX ORDER — ALPRAZOLAM 0.25 MG/1
0.25 TABLET ORAL 2 TIMES DAILY PRN
Qty: 60 TABLET | Refills: 0 | Status: SHIPPED | OUTPATIENT
Start: 2023-09-05 | End: 2023-10-26 | Stop reason: SDUPTHER

## 2023-10-18 NOTE — TELEPHONE ENCOUNTER
Returned call and spoke to patient's daughter, results given and informed of different antibiotic being sent in. She will schedule the imaging at [facility] and once that is done she will give us a call to schedule follow up appt, patient's daughter verbally understood.   18-Oct-2023 16:48

## 2023-10-20 ENCOUNTER — LAB VISIT (OUTPATIENT)
Dept: LAB | Facility: HOSPITAL | Age: 83
End: 2023-10-20
Attending: NURSE PRACTITIONER
Payer: MEDICARE

## 2023-10-20 DIAGNOSIS — D50.8 IRON DEFICIENCY ANEMIA SECONDARY TO INADEQUATE DIETARY IRON INTAKE: ICD-10-CM

## 2023-10-20 DIAGNOSIS — I10 ESSENTIAL HYPERTENSION: ICD-10-CM

## 2023-10-20 DIAGNOSIS — E78.2 MIXED HYPERLIPIDEMIA: ICD-10-CM

## 2023-10-20 LAB
ALBUMIN SERPL BCP-MCNC: 4.3 G/DL (ref 3.5–5.2)
ALP SERPL-CCNC: 57 U/L (ref 55–135)
ALT SERPL W/O P-5'-P-CCNC: 5 U/L (ref 10–44)
ANION GAP SERPL CALC-SCNC: 7 MMOL/L (ref 8–16)
AST SERPL-CCNC: 12 U/L (ref 10–40)
BASOPHILS # BLD AUTO: 0.03 K/UL (ref 0–0.2)
BASOPHILS NFR BLD: 0.5 % (ref 0–1.9)
BILIRUB SERPL-MCNC: 0.6 MG/DL (ref 0.1–1)
BUN SERPL-MCNC: 18 MG/DL (ref 8–23)
CALCIUM SERPL-MCNC: 9.4 MG/DL (ref 8.7–10.5)
CHLORIDE SERPL-SCNC: 106 MMOL/L (ref 95–110)
CHOLEST SERPL-MCNC: 190 MG/DL (ref 120–199)
CHOLEST/HDLC SERPL: 4 {RATIO} (ref 2–5)
CO2 SERPL-SCNC: 24 MMOL/L (ref 23–29)
CREAT SERPL-MCNC: 1.1 MG/DL (ref 0.5–1.4)
DIFFERENTIAL METHOD: ABNORMAL
EOSINOPHIL # BLD AUTO: 0.2 K/UL (ref 0–0.5)
EOSINOPHIL NFR BLD: 3.1 % (ref 0–8)
ERYTHROCYTE [DISTWIDTH] IN BLOOD BY AUTOMATED COUNT: 18.1 % (ref 11.5–14.5)
EST. GFR  (NO RACE VARIABLE): 49.2 ML/MIN/1.73 M^2
FERRITIN SERPL-MCNC: 7.3 NG/ML (ref 20–300)
GLUCOSE SERPL-MCNC: 81 MG/DL (ref 70–110)
HCT VFR BLD AUTO: 35.6 % (ref 37–48.5)
HDLC SERPL-MCNC: 47 MG/DL (ref 40–75)
HDLC SERPL: 24.7 % (ref 20–50)
HGB BLD-MCNC: 10.4 G/DL (ref 12–16)
IMM GRANULOCYTES # BLD AUTO: 0.01 K/UL (ref 0–0.04)
IMM GRANULOCYTES NFR BLD AUTO: 0.2 % (ref 0–0.5)
IRON SERPL-MCNC: 28 UG/DL (ref 30–160)
LDLC SERPL CALC-MCNC: 115.2 MG/DL (ref 63–159)
LYMPHOCYTES # BLD AUTO: 3 K/UL (ref 1–4.8)
LYMPHOCYTES NFR BLD: 49 % (ref 18–48)
MCH RBC QN AUTO: 23.4 PG (ref 27–31)
MCHC RBC AUTO-ENTMCNC: 29.2 G/DL (ref 32–36)
MCV RBC AUTO: 80 FL (ref 82–98)
MONOCYTES # BLD AUTO: 0.4 K/UL (ref 0.3–1)
MONOCYTES NFR BLD: 6.8 % (ref 4–15)
NEUTROPHILS # BLD AUTO: 2.5 K/UL (ref 1.8–7.7)
NEUTROPHILS NFR BLD: 40.4 % (ref 38–73)
NONHDLC SERPL-MCNC: 143 MG/DL
NRBC BLD-RTO: 0 /100 WBC
PLATELET # BLD AUTO: 218 K/UL (ref 150–450)
PMV BLD AUTO: 10.9 FL (ref 9.2–12.9)
POTASSIUM SERPL-SCNC: 4 MMOL/L (ref 3.5–5.1)
PROT SERPL-MCNC: 6.9 G/DL (ref 6–8.4)
RBC # BLD AUTO: 4.45 M/UL (ref 4–5.4)
SATURATED IRON: 6 % (ref 20–50)
SODIUM SERPL-SCNC: 137 MMOL/L (ref 136–145)
TOTAL IRON BINDING CAPACITY: 433 UG/DL (ref 250–450)
TRANSFERRIN SERPL-MCNC: 309 MG/DL (ref 200–375)
TRIGL SERPL-MCNC: 139 MG/DL (ref 30–150)
WBC # BLD AUTO: 6.21 K/UL (ref 3.9–12.7)

## 2023-10-20 PROCEDURE — 83540 ASSAY OF IRON: CPT | Performed by: NURSE PRACTITIONER

## 2023-10-20 PROCEDURE — 80061 LIPID PANEL: CPT | Performed by: NURSE PRACTITIONER

## 2023-10-20 PROCEDURE — 80053 COMPREHEN METABOLIC PANEL: CPT | Performed by: NURSE PRACTITIONER

## 2023-10-20 PROCEDURE — 82728 ASSAY OF FERRITIN: CPT | Performed by: NURSE PRACTITIONER

## 2023-10-20 PROCEDURE — 85025 COMPLETE CBC W/AUTO DIFF WBC: CPT | Performed by: NURSE PRACTITIONER

## 2023-10-20 PROCEDURE — 36415 COLL VENOUS BLD VENIPUNCTURE: CPT | Performed by: NURSE PRACTITIONER

## 2023-10-20 PROCEDURE — 84466 ASSAY OF TRANSFERRIN: CPT | Performed by: NURSE PRACTITIONER

## 2023-10-23 ENCOUNTER — OFFICE VISIT (OUTPATIENT)
Dept: FAMILY MEDICINE | Facility: CLINIC | Age: 83
End: 2023-10-23
Payer: MEDICARE

## 2023-10-23 VITALS
OXYGEN SATURATION: 98 % | HEIGHT: 63 IN | WEIGHT: 120 LBS | DIASTOLIC BLOOD PRESSURE: 84 MMHG | TEMPERATURE: 98 F | SYSTOLIC BLOOD PRESSURE: 132 MMHG | HEART RATE: 96 BPM | BODY MASS INDEX: 21.26 KG/M2

## 2023-10-23 DIAGNOSIS — F41.9 ANXIETY: ICD-10-CM

## 2023-10-23 DIAGNOSIS — K86.2 PANCREATIC CYST: ICD-10-CM

## 2023-10-23 DIAGNOSIS — N18.31 STAGE 3A CHRONIC KIDNEY DISEASE: ICD-10-CM

## 2023-10-23 DIAGNOSIS — I10 ESSENTIAL HYPERTENSION: Primary | ICD-10-CM

## 2023-10-23 DIAGNOSIS — M25.50 CHRONIC PAIN OF MULTIPLE JOINTS: ICD-10-CM

## 2023-10-23 DIAGNOSIS — Z23 NEED FOR INFLUENZA VACCINATION: ICD-10-CM

## 2023-10-23 DIAGNOSIS — E78.2 MIXED HYPERLIPIDEMIA: ICD-10-CM

## 2023-10-23 DIAGNOSIS — G89.29 CHRONIC PAIN OF MULTIPLE JOINTS: ICD-10-CM

## 2023-10-23 DIAGNOSIS — D50.8 IRON DEFICIENCY ANEMIA SECONDARY TO INADEQUATE DIETARY IRON INTAKE: ICD-10-CM

## 2023-10-23 PROCEDURE — 1159F MED LIST DOCD IN RCRD: CPT | Mod: CPTII,S$GLB,, | Performed by: NURSE PRACTITIONER

## 2023-10-23 PROCEDURE — G0008 FLU VACCINE - QUADRIVALENT - HIGH DOSE (65+) PRESERVATIVE FREE IM: ICD-10-PCS | Mod: S$GLB,,, | Performed by: NURSE PRACTITIONER

## 2023-10-23 PROCEDURE — 3079F DIAST BP 80-89 MM HG: CPT | Mod: CPTII,S$GLB,, | Performed by: NURSE PRACTITIONER

## 2023-10-23 PROCEDURE — 90662 IIV NO PRSV INCREASED AG IM: CPT | Mod: S$GLB,,, | Performed by: NURSE PRACTITIONER

## 2023-10-23 PROCEDURE — 1126F AMNT PAIN NOTED NONE PRSNT: CPT | Mod: CPTII,S$GLB,, | Performed by: NURSE PRACTITIONER

## 2023-10-23 PROCEDURE — 3288F PR FALLS RISK ASSESSMENT DOCUMENTED: ICD-10-PCS | Mod: CPTII,S$GLB,, | Performed by: NURSE PRACTITIONER

## 2023-10-23 PROCEDURE — 3075F PR MOST RECENT SYSTOLIC BLOOD PRESS GE 130-139MM HG: ICD-10-PCS | Mod: CPTII,S$GLB,, | Performed by: NURSE PRACTITIONER

## 2023-10-23 PROCEDURE — 1101F PR PT FALLS ASSESS DOC 0-1 FALLS W/OUT INJ PAST YR: ICD-10-PCS | Mod: CPTII,S$GLB,, | Performed by: NURSE PRACTITIONER

## 2023-10-23 PROCEDURE — 1126F PR PAIN SEVERITY QUANTIFIED, NO PAIN PRESENT: ICD-10-PCS | Mod: CPTII,S$GLB,, | Performed by: NURSE PRACTITIONER

## 2023-10-23 PROCEDURE — 3288F FALL RISK ASSESSMENT DOCD: CPT | Mod: CPTII,S$GLB,, | Performed by: NURSE PRACTITIONER

## 2023-10-23 PROCEDURE — 1159F PR MEDICATION LIST DOCUMENTED IN MEDICAL RECORD: ICD-10-PCS | Mod: CPTII,S$GLB,, | Performed by: NURSE PRACTITIONER

## 2023-10-23 PROCEDURE — 99214 OFFICE O/P EST MOD 30 MIN: CPT | Mod: S$GLB,,, | Performed by: NURSE PRACTITIONER

## 2023-10-23 PROCEDURE — 3075F SYST BP GE 130 - 139MM HG: CPT | Mod: CPTII,S$GLB,, | Performed by: NURSE PRACTITIONER

## 2023-10-23 PROCEDURE — G0008 ADMIN INFLUENZA VIRUS VAC: HCPCS | Mod: S$GLB,,, | Performed by: NURSE PRACTITIONER

## 2023-10-23 PROCEDURE — 1160F PR REVIEW ALL MEDS BY PRESCRIBER/CLIN PHARMACIST DOCUMENTED: ICD-10-PCS | Mod: CPTII,S$GLB,, | Performed by: NURSE PRACTITIONER

## 2023-10-23 PROCEDURE — 1160F RVW MEDS BY RX/DR IN RCRD: CPT | Mod: CPTII,S$GLB,, | Performed by: NURSE PRACTITIONER

## 2023-10-23 PROCEDURE — 1101F PT FALLS ASSESS-DOCD LE1/YR: CPT | Mod: CPTII,S$GLB,, | Performed by: NURSE PRACTITIONER

## 2023-10-23 PROCEDURE — 90662 FLU VACCINE - QUADRIVALENT - HIGH DOSE (65+) PRESERVATIVE FREE IM: ICD-10-PCS | Mod: S$GLB,,, | Performed by: NURSE PRACTITIONER

## 2023-10-23 PROCEDURE — 99214 PR OFFICE/OUTPT VISIT, EST, LEVL IV, 30-39 MIN: ICD-10-PCS | Mod: S$GLB,,, | Performed by: NURSE PRACTITIONER

## 2023-10-23 PROCEDURE — 3079F PR MOST RECENT DIASTOLIC BLOOD PRESSURE 80-89 MM HG: ICD-10-PCS | Mod: CPTII,S$GLB,, | Performed by: NURSE PRACTITIONER

## 2023-10-23 RX ORDER — DULOXETIN HYDROCHLORIDE 30 MG/1
30 CAPSULE, DELAYED RELEASE ORAL DAILY
Qty: 30 CAPSULE | Refills: 1 | Status: SHIPPED | OUTPATIENT
Start: 2023-10-23

## 2023-10-23 RX ORDER — FERROUS GLUCONATE 324(38)MG
324 TABLET ORAL
Qty: 90 TABLET | Refills: 1 | COMMUNITY
Start: 2023-10-23

## 2023-10-23 NOTE — PROGRESS NOTES
SUBJECTIVE:      Patient ID: Martha Alonso is a 83 y.o. female.    Chief Complaint: Follow-up and lab review    83-year-old female presents to the clinic for anxiety and hypertension follow-up.  She is presents with her daughter. Labs completed recently were reviewed.     Anxiety is stable with Xanax.  I have offered additional medication to help with anxiety in the past, but patient states it is manageable.  Her mood is stable today. Denies SI.    Initial blood pressure reading is elevated.  Her blood pressure has been stable at home. She monitors her BP frequently. Reports compliance amlodipine 10 mg and benazepril 20 mg daily. Kidney function overall stable, when compared to 6 months ago.     She continues to have chronic joint pain.  Reports pain to bilateral shoulders and right hip.  No longer having knee pain.  Received a steroid injection to the hip in March with no relief. She has used Voltaren gel and Tylenol arthritis without relief.  Requesting referral to rheumatologist for chronic joint pain.     She has some iron deficiency anemia.  Ferratin, iron and iron saturation were low.  She denies fatigue or associated symptoms.  No longer taking iron supplements.  The eGFR is stable from 6 months ago.      Cholesterol has improved some, still managed with diet.     She had a CT scan in 2021 which showed a stable unchanged pancreatic cyst.  Patient's daughter would like repeat imaging for surveillance.         History reviewed. No pertinent family history.   Social History     Socioeconomic History    Marital status:    Occupational History    Occupation: retired   Tobacco Use    Smoking status: Never    Smokeless tobacco: Never   Substance and Sexual Activity    Drug use: Never    Sexual activity: Not Currently     Current Outpatient Medications   Medication Sig Dispense Refill    ALPRAZolam (XANAX) 0.25 MG tablet TAKE 1 TABLET(0.25 MG) BY MOUTH TWICE DAILY AS NEEDED FOR ANXIETY 60 tablet 0     amLODIPine (NORVASC) 10 MG tablet Take 1 tablet (10 mg total) by mouth once daily. 90 tablet 1    benazepriL (LOTENSIN) 20 MG tablet Take 1 tablet (20 mg total) by mouth once daily. 90 tablet 1    diclofenac sodium (VOLTAREN) 1 % Gel Apply topically 2 (two) times daily. 450 g 3    famotidine (PEPCID) 20 MG tablet Take 1 tablet (20 mg total) by mouth 2 (two) times daily. 180 tablet 1    ibuprofen (ADVIL,MOTRIN) 200 MG tablet Take 200 mg by mouth every 6 (six) hours as needed for Pain. As needed for hip pain      ondansetron (ZOFRAN-ODT) 4 MG TbDL Take 1 tablet (4 mg total) by mouth every 12 (twelve) hours as needed (nausea). 21 tablet 0    DULoxetine (CYMBALTA) 30 MG capsule Take 1 capsule (30 mg total) by mouth once daily. 30 capsule 1    ferrous gluconate (FERGON) 324 MG tablet Take 1 tablet (324 mg total) by mouth daily with breakfast. 90 tablet 1     No current facility-administered medications for this visit.     Review of patient's allergies indicates:  No Known Allergies   Past Medical History:   Diagnosis Date    Hypertension     Rheumatoid arthritis      History reviewed. No pertinent surgical history.    Review of Systems   Constitutional:  Negative for activity change, appetite change, chills, diaphoresis, fatigue, fever and unexpected weight change.   HENT:  Negative for congestion, ear pain, sinus pressure, sore throat, trouble swallowing and voice change.    Eyes:  Negative for pain, discharge and visual disturbance.   Respiratory:  Negative for cough, chest tightness, shortness of breath and wheezing.    Cardiovascular:  Negative for chest pain and palpitations.   Gastrointestinal:  Negative for abdominal pain, constipation, diarrhea, nausea and vomiting.   Genitourinary:  Negative for difficulty urinating, flank pain, frequency and urgency.   Musculoskeletal:  Positive for arthralgias. Negative for back pain, joint swelling and neck pain.        Joint pain all over   Skin:  Negative for color change  "and rash.   Neurological:  Negative for dizziness, seizures, syncope, weakness, numbness and headaches.   Hematological:  Negative for adenopathy.   Psychiatric/Behavioral:  Negative for dysphoric mood and sleep disturbance. The patient is nervous/anxious.       OBJECTIVE:      Vitals:    10/23/23 0944 10/23/23 1014   BP: (!) 156/84 132/84   BP Location: Left arm    Patient Position: Sitting    BP Method: Medium (Automatic)    Pulse: 96    Temp: 97.8 °F (36.6 °C)    TempSrc: Oral    SpO2: 98%    Weight: 54.4 kg (120 lb)    Height: 5' 3" (1.6 m)      Physical Exam  Vitals and nursing note reviewed.   Constitutional:       General: She is awake. She is not in acute distress.     Appearance: Normal appearance. She is overweight. She is not ill-appearing, toxic-appearing or diaphoretic.   HENT:      Head: Normocephalic and atraumatic.      Nose: Nose normal.   Eyes:      General: Lids are normal. Gaze aligned appropriately.      Conjunctiva/sclera: Conjunctivae normal.      Right eye: Right conjunctiva is not injected.      Left eye: Left conjunctiva is not injected.      Pupils: Pupils are equal, round, and reactive to light.   Cardiovascular:      Rate and Rhythm: Normal rate and regular rhythm.      Pulses: Normal pulses.      Heart sounds: Normal heart sounds, S1 normal and S2 normal. No murmur heard.     No friction rub. No gallop.   Pulmonary:      Effort: Pulmonary effort is normal. No respiratory distress.      Breath sounds: Normal breath sounds. No stridor. No decreased breath sounds, wheezing, rhonchi or rales.   Chest:      Chest wall: No tenderness.   Musculoskeletal:      Cervical back: Neck supple.      Right lower leg: No edema.      Left lower leg: No edema.   Lymphadenopathy:      Cervical: No cervical adenopathy.   Skin:     General: Skin is warm and dry.      Capillary Refill: Capillary refill takes less than 2 seconds.      Findings: No erythema or rash.   Neurological:      Mental Status: She is " alert and oriented to person, place, and time. Mental status is at baseline.   Psychiatric:         Attention and Perception: Attention normal.         Mood and Affect: Mood is anxious.         Speech: Speech normal.         Behavior: Behavior normal. Behavior is cooperative.         Thought Content: Thought content normal.         Judgment: Judgment normal.        Assessment:       1. Essential hypertension    2. Mixed hyperlipidemia    3. Anxiety    4. Pancreatic cyst    5. Iron deficiency anemia secondary to inadequate dietary iron intake    6. Chronic pain of multiple joints    7. Stage 3a chronic kidney disease    8. Need for influenza vaccination        Plan:       Essential hypertension  Reduce the amount of salt in your diet; Avoid drinking too much alcohol; Exercise at least 30 minutes per day most days of the week.  Continue current medications and home BP monitoring.    Mixed hyperlipidemia  Limit red meat, butter, fried foods, cheese, and other foods that have a lot of saturated fat. Consume more: lean meats, fish, fruits, vegetables, whole grains, beans, lentils, and nuts.      Anxiety  Continue Xanax 0.25 mg prn. Start Cymbalta.   -     DULoxetine (CYMBALTA) 30 MG capsule; Take 1 capsule (30 mg total) by mouth once daily.  Dispense: 30 capsule; Refill: 1    Pancreatic cyst  -     CT Abdomen Pelvis  Without Contrast; Future; Expected date: 10/23/2023    Iron deficiency anemia secondary to inadequate dietary iron intake  Restart iron supplements.  Will recheck in 6 months.  If no improvement will send to Heme-Onc.  -     ferrous gluconate (FERGON) 324 MG tablet; Take 1 tablet (324 mg total) by mouth daily with breakfast.  Dispense: 90 tablet; Refill: 1    Chronic pain of multiple joints  Referral placed at patient and family request. Discussed she may need pain management in the future.  Trial of Cymbalta.  Continue Tylenol prn.  Limit NSAIDs due to CKD.   -     Ambulatory referral/consult to  Rheumatology; Future; Expected date: 10/30/2023  -     DULoxetine (CYMBALTA) 30 MG capsule; Take 1 capsule (30 mg total) by mouth once daily.  Dispense: 30 capsule; Refill: 1    Stage 3a chronic kidney disease  Stable, increase fluids, strict BP control, limit/avoid NSAIDs.     Need for influenza vaccination  -     Influenza - Quadrivalent - High Dose (65+) (PF) (IM)      This note was created using [employer] voice recognition software that occasionally misinterprets phrases or words.     I spent a total of 35 minutes on the day of the visit.This includes face to face time and non-face to face time preparing to see the patient (eg, review of tests), obtaining and/or reviewing separately obtained history, documenting clinical information in the electronic or other health record, independently interpreting results and communicating results to the patient/family/caregiver, or care coordinator.    Follow up in about 3 months (around 1/23/2024) for Anxiety.      10/23/2023 ELINA Johnston, FNP

## 2023-10-26 DIAGNOSIS — I10 ESSENTIAL HYPERTENSION: ICD-10-CM

## 2023-10-26 DIAGNOSIS — M15.9 PRIMARY OSTEOARTHRITIS INVOLVING MULTIPLE JOINTS: ICD-10-CM

## 2023-10-26 DIAGNOSIS — F41.9 ANXIETY: ICD-10-CM

## 2023-10-26 DIAGNOSIS — G89.29 CHRONIC RIGHT HIP PAIN: ICD-10-CM

## 2023-10-26 DIAGNOSIS — M25.551 CHRONIC RIGHT HIP PAIN: ICD-10-CM

## 2023-10-26 RX ORDER — ALPRAZOLAM 0.25 MG/1
0.25 TABLET ORAL 2 TIMES DAILY PRN
Qty: 60 TABLET | Refills: 0 | Status: SHIPPED | OUTPATIENT
Start: 2023-10-26 | End: 2023-12-03 | Stop reason: SDUPTHER

## 2023-10-26 RX ORDER — BENAZEPRIL HYDROCHLORIDE 20 MG/1
20 TABLET ORAL DAILY
Qty: 90 TABLET | Refills: 1 | Status: SHIPPED | OUTPATIENT
Start: 2023-10-26

## 2023-10-26 RX ORDER — AMLODIPINE BESYLATE 10 MG/1
10 TABLET ORAL DAILY
Qty: 90 TABLET | Refills: 1 | Status: SHIPPED | OUTPATIENT
Start: 2023-10-26 | End: 2024-01-23 | Stop reason: SDUPTHER

## 2023-10-26 RX ORDER — DICLOFENAC SODIUM 10 MG/G
GEL TOPICAL 2 TIMES DAILY
Qty: 450 G | Refills: 3 | Status: SHIPPED | OUTPATIENT
Start: 2023-10-26 | End: 2024-03-06 | Stop reason: SDUPTHER

## 2023-12-03 DIAGNOSIS — F41.9 ANXIETY: ICD-10-CM

## 2023-12-04 RX ORDER — ALPRAZOLAM 0.25 MG/1
0.25 TABLET ORAL 2 TIMES DAILY PRN
Qty: 60 TABLET | Refills: 0 | Status: SHIPPED | OUTPATIENT
Start: 2023-12-04 | End: 2024-01-23 | Stop reason: SDUPTHER

## 2024-01-23 ENCOUNTER — OFFICE VISIT (OUTPATIENT)
Dept: FAMILY MEDICINE | Facility: CLINIC | Age: 84
End: 2024-01-23
Payer: MEDICARE

## 2024-01-23 VITALS
OXYGEN SATURATION: 95 % | DIASTOLIC BLOOD PRESSURE: 86 MMHG | WEIGHT: 121 LBS | HEART RATE: 85 BPM | BODY MASS INDEX: 21.44 KG/M2 | HEIGHT: 63 IN | SYSTOLIC BLOOD PRESSURE: 138 MMHG | TEMPERATURE: 99 F

## 2024-01-23 DIAGNOSIS — I10 ESSENTIAL HYPERTENSION: ICD-10-CM

## 2024-01-23 DIAGNOSIS — H61.21 HEARING LOSS OF RIGHT EAR DUE TO CERUMEN IMPACTION: ICD-10-CM

## 2024-01-23 DIAGNOSIS — N18.31 STAGE 3A CHRONIC KIDNEY DISEASE: ICD-10-CM

## 2024-01-23 DIAGNOSIS — K21.9 GASTROESOPHAGEAL REFLUX DISEASE, UNSPECIFIED WHETHER ESOPHAGITIS PRESENT: ICD-10-CM

## 2024-01-23 DIAGNOSIS — F41.9 ANXIETY: ICD-10-CM

## 2024-01-23 DIAGNOSIS — G89.29 CHRONIC PAIN OF MULTIPLE JOINTS: Primary | ICD-10-CM

## 2024-01-23 DIAGNOSIS — M25.50 CHRONIC PAIN OF MULTIPLE JOINTS: Primary | ICD-10-CM

## 2024-01-23 DIAGNOSIS — D50.8 IRON DEFICIENCY ANEMIA SECONDARY TO INADEQUATE DIETARY IRON INTAKE: ICD-10-CM

## 2024-01-23 DIAGNOSIS — E78.2 MIXED HYPERLIPIDEMIA: ICD-10-CM

## 2024-01-23 PROCEDURE — 1101F PT FALLS ASSESS-DOCD LE1/YR: CPT | Mod: CPTII,S$GLB,, | Performed by: NURSE PRACTITIONER

## 2024-01-23 PROCEDURE — 3288F FALL RISK ASSESSMENT DOCD: CPT | Mod: CPTII,S$GLB,, | Performed by: NURSE PRACTITIONER

## 2024-01-23 PROCEDURE — 3075F SYST BP GE 130 - 139MM HG: CPT | Mod: CPTII,S$GLB,, | Performed by: NURSE PRACTITIONER

## 2024-01-23 PROCEDURE — 3079F DIAST BP 80-89 MM HG: CPT | Mod: CPTII,S$GLB,, | Performed by: NURSE PRACTITIONER

## 2024-01-23 PROCEDURE — 1126F AMNT PAIN NOTED NONE PRSNT: CPT | Mod: CPTII,S$GLB,, | Performed by: NURSE PRACTITIONER

## 2024-01-23 PROCEDURE — 1160F RVW MEDS BY RX/DR IN RCRD: CPT | Mod: CPTII,S$GLB,, | Performed by: NURSE PRACTITIONER

## 2024-01-23 PROCEDURE — 99214 OFFICE O/P EST MOD 30 MIN: CPT | Mod: S$GLB,,, | Performed by: NURSE PRACTITIONER

## 2024-01-23 PROCEDURE — 99999 PR PBB SHADOW E&M-EST. PATIENT-LVL IV: CPT | Mod: PBBFAC,,, | Performed by: NURSE PRACTITIONER

## 2024-01-23 PROCEDURE — 1159F MED LIST DOCD IN RCRD: CPT | Mod: CPTII,S$GLB,, | Performed by: NURSE PRACTITIONER

## 2024-01-23 RX ORDER — AMLODIPINE BESYLATE 10 MG/1
10 TABLET ORAL DAILY
Qty: 90 TABLET | Refills: 1 | Status: SHIPPED | OUTPATIENT
Start: 2024-01-23 | End: 2024-04-23 | Stop reason: SDUPTHER

## 2024-01-23 RX ORDER — FAMOTIDINE 20 MG/1
20 TABLET, FILM COATED ORAL 2 TIMES DAILY
Qty: 180 TABLET | Refills: 1 | Status: SHIPPED | OUTPATIENT
Start: 2024-01-23 | End: 2024-07-21

## 2024-01-23 RX ORDER — ALPRAZOLAM 0.25 MG/1
0.25 TABLET ORAL 2 TIMES DAILY PRN
Qty: 60 TABLET | Refills: 0 | Status: SHIPPED | OUTPATIENT
Start: 2024-01-23 | End: 2024-03-06 | Stop reason: SDUPTHER

## 2024-01-23 NOTE — PROGRESS NOTES
SUBJECTIVE:      Patient ID: Martha Alonso is a 83 y.o. female.    Chief Complaint: Follow-up and Medication Refill    83-year-old female presents to the clinic for anxiety and hypertension follow-up.  She is presents with her daughter.     Anxiety is stable with Xanax.  I have offered additional medication to help with anxiety in the past, but patient states it is manageable.  Her mood is stable today. Denies SI.    Blood pressure is stable. Reports compliance amlodipine 10 mg and benazepril 20 mg daily. She has CKD will recheck kidney function in 3 months.  Denies chest pain, SOB, and lower extremity swelling.     She continues to have chronic joint pain.  Reports pain to bilateral shoulders and right hip. Previously seen ortho. Received a steroid injection to the hip without relief. She has used Voltaren gel and Tylenol arthritis without relief.  Requesting referral to rheumatologist for chronic joint pain.     She has some iron deficiency anemia.  Ferratin, iron and iron saturation were low.  She denies fatigue or associated symptoms. She restarted iron supplements 3 months ago and reports compliance.  Will check labs in 3 months.      She had a CT scan in 2021 which showed a stable unchanged pancreatic cyst.  Repeat imaging was ordered at the last visit for surveillance, but daughter needs to reschedule CT.      Has some decreased hearing in the right ear.  Reports right ear feels blocked.      History reviewed. No pertinent family history.   Social History     Socioeconomic History    Marital status:    Occupational History    Occupation: retired   Tobacco Use    Smoking status: Never    Smokeless tobacco: Never   Substance and Sexual Activity    Drug use: Never    Sexual activity: Not Currently     Current Outpatient Medications   Medication Sig Dispense Refill    benazepriL (LOTENSIN) 20 MG tablet Take 1 tablet (20 mg total) by mouth once daily. 90 tablet 1    diclofenac sodium (VOLTAREN) 1 % Gel  Apply topically 2 (two) times daily. 450 g 3    DULoxetine (CYMBALTA) 30 MG capsule Take 1 capsule (30 mg total) by mouth once daily. 30 capsule 1    ferrous gluconate (FERGON) 324 MG tablet Take 1 tablet (324 mg total) by mouth daily with breakfast. 90 tablet 1    ibuprofen (ADVIL,MOTRIN) 200 MG tablet Take 200 mg by mouth every 6 (six) hours as needed for Pain. As needed for hip pain      ondansetron (ZOFRAN-ODT) 4 MG TbDL Take 1 tablet (4 mg total) by mouth every 12 (twelve) hours as needed (nausea). 21 tablet 0    ALPRAZolam (XANAX) 0.25 MG tablet Take 1 tablet (0.25 mg total) by mouth 2 (two) times daily as needed for Anxiety. 60 tablet 0    amLODIPine (NORVASC) 10 MG tablet Take 1 tablet (10 mg total) by mouth once daily. 90 tablet 1    famotidine (PEPCID) 20 MG tablet Take 1 tablet (20 mg total) by mouth 2 (two) times daily. 180 tablet 1     No current facility-administered medications for this visit.     Review of patient's allergies indicates:  No Known Allergies   Past Medical History:   Diagnosis Date    Hypertension     Rheumatoid arthritis      History reviewed. No pertinent surgical history.    Review of Systems   Constitutional:  Negative for activity change, appetite change, chills, diaphoresis, fatigue, fever and unexpected weight change.   HENT:  Positive for hearing loss (right ear). Negative for congestion, ear pain, sinus pressure, sore throat, trouble swallowing and voice change.    Eyes:  Negative for pain, discharge and visual disturbance.   Respiratory:  Negative for cough, chest tightness, shortness of breath and wheezing.    Cardiovascular:  Negative for chest pain and palpitations.        HTN and hyperlipidemia (managed with diet)   Gastrointestinal:  Negative for abdominal pain, constipation, diarrhea, nausea and vomiting.   Genitourinary:  Negative for difficulty urinating, flank pain, frequency and urgency.   Musculoskeletal:  Positive for arthralgias. Negative for back pain, joint  "swelling and neck pain.        Joint pain all over   Skin:  Negative for color change and rash.   Neurological:  Negative for dizziness, seizures, syncope, weakness, numbness and headaches.   Hematological:  Negative for adenopathy.        Iron deficiency anemia, compliant with iron supplements   Psychiatric/Behavioral:  Negative for dysphoric mood and sleep disturbance. The patient is nervous/anxious.       OBJECTIVE:      Vitals:    01/23/24 0944   BP: 138/86   BP Location: Left arm   Patient Position: Sitting   BP Method: Medium (Automatic)   Pulse: 85   Temp: 98.6 °F (37 °C)   TempSrc: Oral   SpO2: 95%   Weight: 54.9 kg (121 lb)   Height: 5' 3" (1.6 m)     Physical Exam  Vitals and nursing note reviewed.   Constitutional:       General: She is awake. She is not in acute distress.     Appearance: Normal appearance. She is well-developed, well-groomed and normal weight. She is not ill-appearing, toxic-appearing or diaphoretic.   HENT:      Head: Normocephalic and atraumatic.      Right Ear: Decreased hearing noted. There is impacted cerumen.      Nose: Nose normal.   Eyes:      General: Lids are normal. Gaze aligned appropriately.      Conjunctiva/sclera: Conjunctivae normal.      Right eye: Right conjunctiva is not injected.      Left eye: Left conjunctiva is not injected.      Pupils: Pupils are equal, round, and reactive to light.   Neck:      Thyroid: No thyromegaly or thyroid tenderness.   Cardiovascular:      Rate and Rhythm: Normal rate and regular rhythm.      Pulses: Normal pulses.      Heart sounds: Normal heart sounds, S1 normal and S2 normal. No murmur heard.     No friction rub. No gallop.   Pulmonary:      Effort: Pulmonary effort is normal. No respiratory distress.      Breath sounds: Normal breath sounds. No stridor. No decreased breath sounds, wheezing, rhonchi or rales.   Chest:      Chest wall: No tenderness.   Musculoskeletal:      Cervical back: Neck supple.      Right lower leg: No edema.     "  Left lower leg: No edema.   Lymphadenopathy:      Cervical: No cervical adenopathy.   Skin:     General: Skin is warm and dry.      Capillary Refill: Capillary refill takes less than 2 seconds.      Findings: No erythema or rash.   Neurological:      Mental Status: She is alert and oriented to person, place, and time. Mental status is at baseline.   Psychiatric:         Attention and Perception: Attention normal.         Mood and Affect: Mood normal.         Speech: Speech normal.         Behavior: Behavior normal. Behavior is cooperative.         Thought Content: Thought content normal.         Judgment: Judgment normal.      Lab Visit on 10/20/2023   Component Date Value Ref Range Status    WBC 10/20/2023 6.21  3.90 - 12.70 K/uL Final    RBC 10/20/2023 4.45  4.00 - 5.40 M/uL Final    Hemoglobin 10/20/2023 10.4 (L)  12.0 - 16.0 g/dL Final    Hematocrit 10/20/2023 35.6 (L)  37.0 - 48.5 % Final    MCV 10/20/2023 80 (L)  82 - 98 fL Final    MCH 10/20/2023 23.4 (L)  27.0 - 31.0 pg Final    MCHC 10/20/2023 29.2 (L)  32.0 - 36.0 g/dL Final    RDW 10/20/2023 18.1 (H)  11.5 - 14.5 % Final    Platelets 10/20/2023 218  150 - 450 K/uL Final    MPV 10/20/2023 10.9  9.2 - 12.9 fL Final    Immature Granulocytes 10/20/2023 0.2  0.0 - 0.5 % Final    Gran # (ANC) 10/20/2023 2.5  1.8 - 7.7 K/uL Final    Immature Grans (Abs) 10/20/2023 0.01  0.00 - 0.04 K/uL Final    Comment: Mild elevation in immature granulocytes is non specific and   can be seen in a variety of conditions including stress response,   acute inflammation, trauma and pregnancy. Correlation with other   laboratory and clinical findings is essential.      Lymph # 10/20/2023 3.0  1.0 - 4.8 K/uL Final    Mono # 10/20/2023 0.4  0.3 - 1.0 K/uL Final    Eos # 10/20/2023 0.2  0.0 - 0.5 K/uL Final    Baso # 10/20/2023 0.03  0.00 - 0.20 K/uL Final    nRBC 10/20/2023 0  0 /100 WBC Final    Gran % 10/20/2023 40.4  38.0 - 73.0 % Final    Lymph % 10/20/2023 49.0 (H)  18.0 - 48.0  % Final    Mono % 10/20/2023 6.8  4.0 - 15.0 % Final    Eosinophil % 10/20/2023 3.1  0.0 - 8.0 % Final    Basophil % 10/20/2023 0.5  0.0 - 1.9 % Final    Differential Method 10/20/2023 Automated   Final    Cholesterol 10/20/2023 190  120 - 199 mg/dL Final    Comment: The National Cholesterol Education Program (NCEP) has set the  following guidelines (reference ranges) for Cholesterol:  Optimal.....................<200 mg/dL  Borderline High.............200-239 mg/dL  High........................> or = 240 mg/dL      Triglycerides 10/20/2023 139  30 - 150 mg/dL Final    Comment: The National Cholesterol Education Program (NCEP) has set the  following guidelines (reference values) for triglycerides:  Normal......................<150 mg/dL  Borderline High.............150-199 mg/dL  High........................200-499 mg/dL      HDL 10/20/2023 47  40 - 75 mg/dL Final    Comment: The National Cholesterol Education Program (NCEP) has set the  following guidelines (reference values) for HDL Cholesterol:  Low...............<40 mg/dL  Optimal...........>60 mg/dL      LDL Cholesterol 10/20/2023 115.2  63.0 - 159.0 mg/dL Final    Comment: The National Cholesterol Education Program (NCEP) has set the  following guidelines (reference values) for LDL Cholesterol:  Optimal.......................<130 mg/dL  Borderline High...............130-159 mg/dL  High..........................160-189 mg/dL  Very High.....................>190 mg/dL      HDL/Cholesterol Ratio 10/20/2023 24.7  20.0 - 50.0 % Final    Total Cholesterol/HDL Ratio 10/20/2023 4.0  2.0 - 5.0 Final    Non-HDL Cholesterol 10/20/2023 143  mg/dL Final    Comment: Risk category and Non-HDL cholesterol goals:  Coronary heart disease (CHD)or equivalent (10-year risk of CHD >20%):  Non-HDL cholesterol goal     <130 mg/dL  Two or more CHD risk factors and 10-year risk of CHD <= 20%:  Non-HDL cholesterol goal     <160 mg/dL  0 to 1 CHD risk factor:  Non-HDL cholesterol goal      <190 mg/dL      Sodium 10/20/2023 137  136 - 145 mmol/L Final    Potassium 10/20/2023 4.0  3.5 - 5.1 mmol/L Final    Chloride 10/20/2023 106  95 - 110 mmol/L Final    CO2 10/20/2023 24  23 - 29 mmol/L Final    Glucose 10/20/2023 81  70 - 110 mg/dL Final    BUN 10/20/2023 18  8 - 23 mg/dL Final    Creatinine 10/20/2023 1.1  0.5 - 1.4 mg/dL Final    Calcium 10/20/2023 9.4  8.7 - 10.5 mg/dL Final    Total Protein 10/20/2023 6.9  6.0 - 8.4 g/dL Final    Albumin 10/20/2023 4.3  3.5 - 5.2 g/dL Final    Total Bilirubin 10/20/2023 0.6  0.1 - 1.0 mg/dL Final    Comment: For infants and newborns, interpretation of results should be based  on gestational age, weight and in agreement with clinical  observations.    Premature Infant recommended reference ranges:  Up to 24 hours.............<8.0 mg/dL  Up to 48 hours............<12.0 mg/dL  3-5 days..................<15.0 mg/dL  6-29 days.................<15.0 mg/dL      Alkaline Phosphatase 10/20/2023 57  55 - 135 U/L Final    AST 10/20/2023 12  10 - 40 U/L Final    ALT 10/20/2023 5 (L)  10 - 44 U/L Final    eGFR 10/20/2023 49.2 (A)  >60 mL/min/1.73 m^2 Final    Anion Gap 10/20/2023 7 (L)  8 - 16 mmol/L Final    Ferritin 10/20/2023 7.3 (L)  20.0 - 300.0 ng/mL Final    Iron 10/20/2023 28 (L)  30 - 160 ug/dL Final    Transferrin 10/20/2023 309  200 - 375 mg/dL Final    TIBC 10/20/2023 433  250 - 450 ug/dL Final    Saturated Iron 10/20/2023 6 (L)  20 - 50 % Final     Assessment:       1. Chronic pain of multiple joints    2. Anxiety    3. Essential hypertension    4. Gastroesophageal reflux disease, unspecified whether esophagitis present    5. Mixed hyperlipidemia    6. Iron deficiency anemia secondary to inadequate dietary iron intake    7. Stage 3a chronic kidney disease    8. Hearing loss of right ear due to cerumen impaction        Plan:       Chronic pain of multiple joints  Referral placed to rheumatology at patient request. May need pain management.   -     Ambulatory  referral/consult to Rheumatology; Future; Expected date: 01/30/2024    Anxiety  Stable, continue Xanax prn.   -     ALPRAZolam (XANAX) 0.25 MG tablet; Take 1 tablet (0.25 mg total) by mouth 2 (two) times daily as needed for Anxiety.  Dispense: 60 tablet; Refill: 0    Essential hypertension  Stable with Benazepril 20 mg and Amlodipine 10 mg. Reduce the amount of salt in your diet. Continue current medications and home BP monitoring.  -     amLODIPine (NORVASC) 10 MG tablet; Take 1 tablet (10 mg total) by mouth once daily.  Dispense: 90 tablet; Refill: 1  -     Comprehensive Metabolic Panel; Future; Expected date: 04/23/2024  -     Lipid Panel; Future; Expected date: 04/23/2024  -     TSH; Future; Expected date: 04/23/2024  -     Microalbumin/Creatinine Ratio, Urine; Future; Expected date: 04/23/2024  -     Urinalysis; Future; Expected date: 04/23/2024    Gastroesophageal reflux disease, unspecified whether esophagitis present  Stable, continue Pepcid.   -     famotidine (PEPCID) 20 MG tablet; Take 1 tablet (20 mg total) by mouth 2 (two) times daily.  Dispense: 180 tablet; Refill: 1    Mixed hyperlipidemia  Limit red meat, butter, fried foods, cheese, and other foods that have a lot of saturated fat. Consume more: lean meats, fish, fruits, vegetables, whole grains, beans, lentils, and nuts.  -     Comprehensive Metabolic Panel; Future; Expected date: 04/23/2024  -     Lipid Panel; Future; Expected date: 04/23/2024  -     TSH; Future; Expected date: 04/23/2024    Iron deficiency anemia secondary to inadequate dietary iron intake  Continue iron supplements.   -     CBC Auto Differential; Future; Expected date: 04/23/2024  -     Comprehensive Metabolic Panel; Future; Expected date: 04/23/2024  -     Ferritin; Future; Expected date: 04/23/2024  -     Iron and TIBC; Future; Expected date: 04/23/2024    Stage 3a chronic kidney disease  Limit NSAIDs, increase fluids, monitor BP at home.   -     CBC Auto Differential; Future;  Expected date: 04/23/2024  -     Comprehensive Metabolic Panel; Future; Expected date: 04/23/2024  -     Microalbumin/Creatinine Ratio, Urine; Future; Expected date: 04/23/2024  -     Urinalysis; Future; Expected date: 04/23/2024  -     Ferritin; Future; Expected date: 04/23/2024  -     Iron and TIBC; Future; Expected date: 04/23/2024    Hearing loss of right ear due to cerumen impaction  Right ear irrigated, cerumen removed.     This note was created using [employer] voice recognition software that occasionally misinterprets phrases or words.     I spent a total of 23 minutes on the day of the visit.This includes face to face time and non-face to face time preparing to see the patient (eg, review of tests), obtaining and/or reviewing separately obtained history, documenting clinical information in the electronic or other health record, independently interpreting results and communicating results to the patient/family/caregiver, or care coordinator.    Follow up in about 3 months (around 4/23/2024) for Anxiety, HTN, CKD, HLD.          1/23/2024 ELINA Johnston, AARONP

## 2024-02-07 ENCOUNTER — PATIENT MESSAGE (OUTPATIENT)
Dept: RHEUMATOLOGY | Facility: CLINIC | Age: 84
End: 2024-02-07
Payer: MEDICARE

## 2024-02-28 ENCOUNTER — PATIENT OUTREACH (OUTPATIENT)
Dept: ADMINISTRATIVE | Facility: HOSPITAL | Age: 84
End: 2024-02-28
Payer: MEDICARE

## 2024-02-28 NOTE — PROGRESS NOTES
Population Health Chart Review & Patient Outreach Details      Additional Pop Health Notes:               Updates Requested / Reviewed:      Updated Care Coordination Note         Health Maintenance Topics Overdue:    Health Maintenance Due   Topic Date Due    TETANUS VACCINE  Never done    Shingles Vaccine (1 of 2) Never done    RSV Vaccine (Age 60+ and Pregnant patients) (1 - 1-dose 60+ series) Never done    Pneumococcal Vaccines (Age 65+) (2 of 2 - PCV) 01/16/2021    COVID-19 Vaccine (4 - 2023-24 season) 09/01/2023         Health Maintenance Topic(s) Outreach Outcomes & Actions Taken:    Eye Exam - Outreach Outcomes & Actions Taken  : Non-Diabetic Eye External Records Uploaded, Care Team & History Updated if Applicable

## 2024-03-06 DIAGNOSIS — M15.9 PRIMARY OSTEOARTHRITIS INVOLVING MULTIPLE JOINTS: ICD-10-CM

## 2024-03-06 DIAGNOSIS — M25.551 CHRONIC RIGHT HIP PAIN: ICD-10-CM

## 2024-03-06 DIAGNOSIS — F41.9 ANXIETY: ICD-10-CM

## 2024-03-06 DIAGNOSIS — G89.29 CHRONIC RIGHT HIP PAIN: ICD-10-CM

## 2024-03-06 RX ORDER — DICLOFENAC SODIUM 10 MG/G
GEL TOPICAL 2 TIMES DAILY
Qty: 450 G | Refills: 3 | Status: SHIPPED | OUTPATIENT
Start: 2024-03-06 | End: 2024-06-17 | Stop reason: SDUPTHER

## 2024-03-06 RX ORDER — ALPRAZOLAM 0.25 MG/1
0.25 TABLET ORAL 2 TIMES DAILY PRN
Qty: 60 TABLET | Refills: 0 | Status: SHIPPED | OUTPATIENT
Start: 2024-03-06 | End: 2024-04-23 | Stop reason: SDUPTHER

## 2024-04-15 ENCOUNTER — TELEPHONE (OUTPATIENT)
Dept: FAMILY MEDICINE | Facility: CLINIC | Age: 84
End: 2024-04-15
Payer: MEDICARE

## 2024-04-15 DIAGNOSIS — F41.9 ANXIETY: ICD-10-CM

## 2024-04-15 DIAGNOSIS — I10 ESSENTIAL HYPERTENSION: ICD-10-CM

## 2024-04-15 RX ORDER — AMLODIPINE BESYLATE 10 MG/1
10 TABLET ORAL DAILY
Qty: 90 TABLET | Refills: 1 | Status: CANCELLED | OUTPATIENT
Start: 2024-04-15

## 2024-04-16 RX ORDER — ALPRAZOLAM 0.25 MG/1
0.25 TABLET ORAL 2 TIMES DAILY PRN
Qty: 60 TABLET | Refills: 0 | OUTPATIENT
Start: 2024-04-16

## 2024-04-16 RX ORDER — BENAZEPRIL HYDROCHLORIDE 20 MG/1
20 TABLET ORAL DAILY
Qty: 90 TABLET | Refills: 0 | Status: SHIPPED | OUTPATIENT
Start: 2024-04-16

## 2024-04-22 ENCOUNTER — LAB VISIT (OUTPATIENT)
Dept: LAB | Facility: HOSPITAL | Age: 84
End: 2024-04-22
Attending: NURSE PRACTITIONER
Payer: MEDICARE

## 2024-04-22 DIAGNOSIS — E78.1 PURE HYPERGLYCERIDEMIA: ICD-10-CM

## 2024-04-22 DIAGNOSIS — D50.8 IRON DEFICIENCY ANEMIA SECONDARY TO INADEQUATE DIETARY IRON INTAKE: ICD-10-CM

## 2024-04-22 DIAGNOSIS — I10 ESSENTIAL HYPERTENSION, MALIGNANT: ICD-10-CM

## 2024-04-22 DIAGNOSIS — I10 ESSENTIAL HYPERTENSION, MALIGNANT: Primary | ICD-10-CM

## 2024-04-22 DIAGNOSIS — N18.31 CHRONIC KIDNEY DISEASE (CKD) STAGE G3A/A1, MODERATELY DECREASED GLOMERULAR FILTRATION RATE (GFR) BETWEEN 45-59 ML/MIN/1.73 SQUARE METER AND ALBUMINURIA CREATININE RATIO LESS THAN 30 MG/G: ICD-10-CM

## 2024-04-22 DIAGNOSIS — D50.8 IRON DEFICIENCY ANEMIA SECONDARY TO INADEQUATE DIETARY IRON INTAKE: Primary | ICD-10-CM

## 2024-04-22 LAB
ALBUMIN SERPL BCP-MCNC: 4.1 G/DL (ref 3.5–5.2)
ALBUMIN/CREAT UR: 12 UG/MG (ref 0–30)
ALP SERPL-CCNC: 66 U/L (ref 55–135)
ALT SERPL W/O P-5'-P-CCNC: 5 U/L (ref 10–44)
ANION GAP SERPL CALC-SCNC: 6 MMOL/L (ref 8–16)
AST SERPL-CCNC: 12 U/L (ref 10–40)
BACTERIA #/AREA URNS HPF: ABNORMAL /HPF
BASOPHILS # BLD AUTO: 0.01 K/UL (ref 0–0.2)
BASOPHILS NFR BLD: 0.2 % (ref 0–1.9)
BILIRUB SERPL-MCNC: 0.6 MG/DL (ref 0.1–1)
BILIRUB UR QL STRIP: NEGATIVE
BUN SERPL-MCNC: 15 MG/DL (ref 8–23)
CALCIUM SERPL-MCNC: 9.3 MG/DL (ref 8.7–10.5)
CHLORIDE SERPL-SCNC: 108 MMOL/L (ref 95–110)
CHOLEST SERPL-MCNC: 167 MG/DL (ref 120–199)
CHOLEST/HDLC SERPL: 3.8 {RATIO} (ref 2–5)
CLARITY UR: ABNORMAL
CO2 SERPL-SCNC: 23 MMOL/L (ref 23–29)
COLOR UR: YELLOW
CREAT SERPL-MCNC: 1.1 MG/DL (ref 0.5–1.4)
CREAT UR-MCNC: 236.9 MG/DL (ref 15–325)
DIFFERENTIAL METHOD BLD: ABNORMAL
EOSINOPHIL # BLD AUTO: 0.1 K/UL (ref 0–0.5)
EOSINOPHIL NFR BLD: 2.4 % (ref 0–8)
ERYTHROCYTE [DISTWIDTH] IN BLOOD BY AUTOMATED COUNT: 17.5 % (ref 11.5–14.5)
EST. GFR  (NO RACE VARIABLE): 49.2 ML/MIN/1.73 M^2
FERRITIN SERPL-MCNC: 11 NG/ML (ref 20–300)
GLUCOSE SERPL-MCNC: 96 MG/DL (ref 70–110)
GLUCOSE UR QL STRIP: NEGATIVE
HCT VFR BLD AUTO: 40.2 % (ref 37–48.5)
HDLC SERPL-MCNC: 44 MG/DL (ref 40–75)
HDLC SERPL: 26.3 % (ref 20–50)
HGB BLD-MCNC: 12.1 G/DL (ref 12–16)
HGB UR QL STRIP: NEGATIVE
IMM GRANULOCYTES # BLD AUTO: 0.01 K/UL (ref 0–0.04)
IMM GRANULOCYTES NFR BLD AUTO: 0.2 % (ref 0–0.5)
IRON SERPL-MCNC: 41 UG/DL (ref 30–160)
KETONES UR QL STRIP: NEGATIVE
LDLC SERPL CALC-MCNC: 101.6 MG/DL (ref 63–159)
LEUKOCYTE ESTERASE UR QL STRIP: ABNORMAL
LYMPHOCYTES # BLD AUTO: 2.1 K/UL (ref 1–4.8)
LYMPHOCYTES NFR BLD: 42 % (ref 18–48)
MCH RBC QN AUTO: 26 PG (ref 27–31)
MCHC RBC AUTO-ENTMCNC: 30.1 G/DL (ref 32–36)
MCV RBC AUTO: 86 FL (ref 82–98)
MICROALBUMIN UR DL<=1MG/L-MCNC: 28.4 UG/ML
MICROSCOPIC COMMENT: ABNORMAL
MONOCYTES # BLD AUTO: 0.4 K/UL (ref 0.3–1)
MONOCYTES NFR BLD: 7.2 % (ref 4–15)
NEUTROPHILS # BLD AUTO: 2.4 K/UL (ref 1.8–7.7)
NEUTROPHILS NFR BLD: 48 % (ref 38–73)
NITRITE UR QL STRIP: POSITIVE
NONHDLC SERPL-MCNC: 123 MG/DL
NRBC BLD-RTO: 0 /100 WBC
PH UR STRIP: 6 [PH] (ref 5–8)
PLATELET # BLD AUTO: 193 K/UL (ref 150–450)
PMV BLD AUTO: 11.7 FL (ref 9.2–12.9)
POTASSIUM SERPL-SCNC: 4.4 MMOL/L (ref 3.5–5.1)
PROT SERPL-MCNC: 6.8 G/DL (ref 6–8.4)
PROT UR QL STRIP: ABNORMAL
RBC # BLD AUTO: 4.66 M/UL (ref 4–5.4)
RBC #/AREA URNS HPF: 1 /HPF (ref 0–4)
SATURATED IRON: 11 % (ref 20–50)
SODIUM SERPL-SCNC: 137 MMOL/L (ref 136–145)
SP GR UR STRIP: 1.02 (ref 1–1.03)
SQUAMOUS #/AREA URNS HPF: 1 /HPF
TOTAL IRON BINDING CAPACITY: 371 UG/DL (ref 250–450)
TRANSFERRIN SERPL-MCNC: 265 MG/DL (ref 200–375)
TRIGL SERPL-MCNC: 107 MG/DL (ref 30–150)
TSH SERPL DL<=0.005 MIU/L-ACNC: 1.2 UIU/ML (ref 0.34–5.6)
URN SPEC COLLECT METH UR: ABNORMAL
UROBILINOGEN UR STRIP-ACNC: ABNORMAL EU/DL
WBC # BLD AUTO: 5.02 K/UL (ref 3.9–12.7)
WBC #/AREA URNS HPF: 53 /HPF (ref 0–5)
YEAST URNS QL MICRO: ABNORMAL

## 2024-04-22 PROCEDURE — 36415 COLL VENOUS BLD VENIPUNCTURE: CPT | Performed by: NURSE PRACTITIONER

## 2024-04-22 PROCEDURE — 83540 ASSAY OF IRON: CPT | Performed by: NURSE PRACTITIONER

## 2024-04-22 PROCEDURE — 82728 ASSAY OF FERRITIN: CPT | Performed by: NURSE PRACTITIONER

## 2024-04-22 PROCEDURE — 80061 LIPID PANEL: CPT | Performed by: NURSE PRACTITIONER

## 2024-04-22 PROCEDURE — 82043 UR ALBUMIN QUANTITATIVE: CPT | Performed by: NURSE PRACTITIONER

## 2024-04-22 PROCEDURE — 80053 COMPREHEN METABOLIC PANEL: CPT | Performed by: NURSE PRACTITIONER

## 2024-04-22 PROCEDURE — 81001 URINALYSIS AUTO W/SCOPE: CPT | Performed by: NURSE PRACTITIONER

## 2024-04-22 PROCEDURE — 85025 COMPLETE CBC W/AUTO DIFF WBC: CPT | Performed by: NURSE PRACTITIONER

## 2024-04-22 PROCEDURE — 84443 ASSAY THYROID STIM HORMONE: CPT | Performed by: NURSE PRACTITIONER

## 2024-04-23 ENCOUNTER — OFFICE VISIT (OUTPATIENT)
Dept: FAMILY MEDICINE | Facility: CLINIC | Age: 84
End: 2024-04-23
Payer: MEDICARE

## 2024-04-23 VITALS
SYSTOLIC BLOOD PRESSURE: 130 MMHG | DIASTOLIC BLOOD PRESSURE: 82 MMHG | HEART RATE: 84 BPM | OXYGEN SATURATION: 97 % | WEIGHT: 117.5 LBS | HEIGHT: 63 IN | BODY MASS INDEX: 20.82 KG/M2 | RESPIRATION RATE: 16 BRPM

## 2024-04-23 DIAGNOSIS — F41.9 ANXIETY: ICD-10-CM

## 2024-04-23 DIAGNOSIS — N18.31 STAGE 3A CHRONIC KIDNEY DISEASE: Primary | ICD-10-CM

## 2024-04-23 DIAGNOSIS — I10 ESSENTIAL HYPERTENSION: ICD-10-CM

## 2024-04-23 DIAGNOSIS — E78.2 MIXED HYPERLIPIDEMIA: ICD-10-CM

## 2024-04-23 DIAGNOSIS — D50.8 IRON DEFICIENCY ANEMIA SECONDARY TO INADEQUATE DIETARY IRON INTAKE: ICD-10-CM

## 2024-04-23 DIAGNOSIS — K86.2 PANCREATIC CYST: ICD-10-CM

## 2024-04-23 DIAGNOSIS — N39.0 URINARY TRACT INFECTION WITHOUT HEMATURIA, SITE UNSPECIFIED: ICD-10-CM

## 2024-04-23 PROCEDURE — 1126F AMNT PAIN NOTED NONE PRSNT: CPT | Mod: CPTII,S$GLB,, | Performed by: NURSE PRACTITIONER

## 2024-04-23 PROCEDURE — 3288F FALL RISK ASSESSMENT DOCD: CPT | Mod: CPTII,S$GLB,, | Performed by: NURSE PRACTITIONER

## 2024-04-23 PROCEDURE — 1160F RVW MEDS BY RX/DR IN RCRD: CPT | Mod: CPTII,S$GLB,, | Performed by: NURSE PRACTITIONER

## 2024-04-23 PROCEDURE — 99999 PR PBB SHADOW E&M-EST. PATIENT-LVL IV: CPT | Mod: PBBFAC,,, | Performed by: NURSE PRACTITIONER

## 2024-04-23 PROCEDURE — 3079F DIAST BP 80-89 MM HG: CPT | Mod: CPTII,S$GLB,, | Performed by: NURSE PRACTITIONER

## 2024-04-23 PROCEDURE — 99214 OFFICE O/P EST MOD 30 MIN: CPT | Mod: S$GLB,,, | Performed by: NURSE PRACTITIONER

## 2024-04-23 PROCEDURE — 3075F SYST BP GE 130 - 139MM HG: CPT | Mod: CPTII,S$GLB,, | Performed by: NURSE PRACTITIONER

## 2024-04-23 PROCEDURE — 1159F MED LIST DOCD IN RCRD: CPT | Mod: CPTII,S$GLB,, | Performed by: NURSE PRACTITIONER

## 2024-04-23 PROCEDURE — 1101F PT FALLS ASSESS-DOCD LE1/YR: CPT | Mod: CPTII,S$GLB,, | Performed by: NURSE PRACTITIONER

## 2024-04-23 RX ORDER — AMLODIPINE BESYLATE 10 MG/1
10 TABLET ORAL DAILY
Qty: 90 TABLET | Refills: 3 | Status: SHIPPED | OUTPATIENT
Start: 2024-04-23

## 2024-04-23 RX ORDER — ALPRAZOLAM 0.25 MG/1
0.25 TABLET ORAL 2 TIMES DAILY PRN
Qty: 60 TABLET | Refills: 0 | Status: SHIPPED | OUTPATIENT
Start: 2024-04-23 | End: 2024-05-30 | Stop reason: SDUPTHER

## 2024-04-23 RX ORDER — CIPROFLOXACIN 250 MG/1
250 TABLET, FILM COATED ORAL 2 TIMES DAILY
Qty: 6 TABLET | Refills: 0 | Status: SHIPPED | OUTPATIENT
Start: 2024-04-23 | End: 2024-04-26

## 2024-04-23 NOTE — PROGRESS NOTES
SUBJECTIVE:      Patient ID: Martha Alonso is a 83 y.o. female.    Chief Complaint: lab review    83-year-old female presents to the clinic for anxiety follow-up and lab review.  She is doing well today and offers no new complaints.    *Anxiety - Anxiety is stable with Xanax.  I have offered additional medication to help with anxiety in the past, but patient states it is manageable.  Her mood is stable today. Denies SI.    *CKD - kidney function has remained relatively stable over the last year, eGFR 49.2, creating 1.1. Mild microalbuminuria.     *HTN - stable with benazepril 20 mg and amlodipine 10 mg.  Initial BP was elevated, but BP stable with repeat, 130/82.  Denies chest pain, shortness for breath, lower extremity edema.    *HLD - cholesterol has gradually improved over the years.  Patient does not take any cholesterol medications, .6, HDL 44, triglycerides 107, total cholesterol 167.    *Anemia - anemia is relatively stable, hematocrit crit and hemoglobin were within normal limits.  Iron saturation and ferratin were both sltight low. She has been taking the iron supplements over the last month. Anemia has improved from previous. Anemia may be secondary to CKD.    She had a CT scan in 2021 which showed a stable unchanged pancreatic cyst.  Repeat imaging was ordered at the last visit for surveillance, but was unable to completed in time and needs a new order.      UA consistent with UTI, will treat with Cipro.         No family history on file.   Social History     Socioeconomic History    Marital status:    Occupational History    Occupation: retired   Tobacco Use    Smoking status: Never    Smokeless tobacco: Never   Substance and Sexual Activity    Drug use: Never    Sexual activity: Not Currently     Current Outpatient Medications   Medication Sig Dispense Refill    benazepriL (LOTENSIN) 20 MG tablet Take 1 tablet (20 mg total) by mouth once daily. 90 tablet 0    diclofenac sodium  (VOLTAREN) 1 % Gel Apply topically 2 (two) times daily. 450 g 3    DULoxetine (CYMBALTA) 30 MG capsule Take 1 capsule (30 mg total) by mouth once daily. 30 capsule 1    famotidine (PEPCID) 20 MG tablet Take 1 tablet (20 mg total) by mouth 2 (two) times daily. 180 tablet 1    ferrous gluconate (FERGON) 324 MG tablet Take 1 tablet (324 mg total) by mouth daily with breakfast. 90 tablet 1    ibuprofen (ADVIL,MOTRIN) 200 MG tablet Take 200 mg by mouth every 6 (six) hours as needed for Pain. As needed for hip pain      ondansetron (ZOFRAN-ODT) 4 MG TbDL Take 1 tablet (4 mg total) by mouth every 12 (twelve) hours as needed (nausea). 21 tablet 0    ALPRAZolam (XANAX) 0.25 MG tablet Take 1 tablet (0.25 mg total) by mouth 2 (two) times daily as needed for Anxiety. 60 tablet 0    amLODIPine (NORVASC) 10 MG tablet Take 1 tablet (10 mg total) by mouth once daily. 90 tablet 3    ciprofloxacin HCl (CIPRO) 250 MG tablet Take 1 tablet (250 mg total) by mouth 2 (two) times daily. for 3 days 6 tablet 0     No current facility-administered medications for this visit.     Review of patient's allergies indicates:  No Known Allergies   Past Medical History:   Diagnosis Date    Hypertension     Rheumatoid arthritis      No past surgical history on file.    Review of Systems   Constitutional:  Negative for activity change, appetite change, chills, diaphoresis, fatigue, fever and unexpected weight change.   HENT:  Positive for hearing loss (right ear). Negative for congestion, ear pain, sinus pressure, sore throat, trouble swallowing and voice change.    Eyes:  Negative for pain, discharge and visual disturbance.   Respiratory:  Negative for cough, chest tightness, shortness of breath and wheezing.    Cardiovascular:  Negative for chest pain and palpitations.        HTN and hyperlipidemia (managed with diet)   Gastrointestinal:  Negative for abdominal pain, constipation, diarrhea, nausea and vomiting.   Genitourinary:  Negative for  "difficulty urinating, flank pain, frequency and urgency.   Musculoskeletal:  Positive for arthralgias. Negative for back pain, joint swelling and neck pain.        Joint pain all over   Skin:  Negative for color change and rash.   Neurological:  Negative for dizziness, seizures, syncope, weakness, numbness and headaches.   Hematological:  Negative for adenopathy.        Iron deficiency anemia, compliant with iron supplements   Psychiatric/Behavioral:  Negative for dysphoric mood and sleep disturbance. The patient is not nervous/anxious.       OBJECTIVE:      Vitals:    04/23/24 1029 04/23/24 1058   BP: (!) 156/84 130/82   BP Location: Left arm    Patient Position: Sitting    BP Method: Medium (Automatic)    Pulse: 84    Resp: 16    SpO2: 97%    Weight: 53.3 kg (117 lb 8 oz)    Height: 5' 3" (1.6 m)      Physical Exam  Vitals and nursing note reviewed.   Constitutional:       General: She is awake. She is not in acute distress.     Appearance: Normal appearance. She is normal weight. She is not ill-appearing, toxic-appearing or diaphoretic.   HENT:      Head: Normocephalic and atraumatic.      Nose: Nose normal.   Eyes:      General: Lids are normal. Gaze aligned appropriately.      Conjunctiva/sclera: Conjunctivae normal.      Right eye: Right conjunctiva is not injected.      Left eye: Left conjunctiva is not injected.      Pupils: Pupils are equal, round, and reactive to light.   Cardiovascular:      Rate and Rhythm: Normal rate and regular rhythm.      Pulses: Normal pulses.      Heart sounds: Normal heart sounds, S1 normal and S2 normal. No murmur heard.     No friction rub. No gallop.   Pulmonary:      Effort: Pulmonary effort is normal. No respiratory distress.      Breath sounds: Normal breath sounds. No stridor. No decreased breath sounds, wheezing, rhonchi or rales.   Chest:      Chest wall: No tenderness.   Musculoskeletal:      Cervical back: Neck supple.      Right lower leg: No edema.      Left lower " leg: No edema.   Lymphadenopathy:      Cervical: No cervical adenopathy.   Skin:     General: Skin is warm and dry.      Capillary Refill: Capillary refill takes less than 2 seconds.      Findings: No erythema or rash.   Neurological:      Mental Status: She is alert and oriented to person, place, and time. Mental status is at baseline.   Psychiatric:         Attention and Perception: Attention normal.         Mood and Affect: Mood normal.         Speech: Speech normal.         Behavior: Behavior normal. Behavior is cooperative.         Thought Content: Thought content normal.         Judgment: Judgment normal.        Lab Visit on 04/22/2024   Component Date Value Ref Range Status    Iron 04/22/2024 41  30 - 160 ug/dL Final    Transferrin 04/22/2024 265  200 - 375 mg/dL Final    TIBC 04/22/2024 371  250 - 450 ug/dL Final    Saturated Iron 04/22/2024 11 (L)  20 - 50 % Final    Ferritin 04/22/2024 11.0 (L)  20.0 - 300.0 ng/mL Final    Specimen UA 04/22/2024 Urine, Clean Catch   Final    Color, UA 04/22/2024 Yellow  Yellow, Straw, [place] Final    Appearance, UA 04/22/2024 Hazy (A)  Clear Final    pH, UA 04/22/2024 6.0  5.0 - 8.0 Final    Specific Gravity, UA 04/22/2024 1.025  1.005 - 1.030 Final    Protein, UA 04/22/2024 Trace (A)  Negative Final    Comment: Recommend a 24 hour urine protein or a urine   protein/creatinine ratio if globulin induced proteinuria is  clinically suspected.      Glucose, UA 04/22/2024 Negative  Negative Final    Ketones, UA 04/22/2024 Negative  Negative Final    Bilirubin (UA) 04/22/2024 Negative  Negative Final    Occult Blood UA 04/22/2024 Negative  Negative Final    Nitrite, UA 04/22/2024 Positive (A)  Negative Final    Urobilinogen, UA 04/22/2024 2.0-3.0 (A)  Negative EU/dL Final    Leukocytes, UA 04/22/2024 2+ (A)  Negative Final    TSH 04/22/2024 1.202  0.340 - 5.600 uIU/mL Final    Cholesterol 04/22/2024 167  120 - 199 mg/dL Final    Comment: The National Cholesterol Education Program  (NCEP) has set the  following guidelines (reference ranges) for Cholesterol:  Optimal.....................<200 mg/dL  Borderline High.............200-239 mg/dL  High........................> or = 240 mg/dL      Triglycerides 04/22/2024 107  30 - 150 mg/dL Final    Comment: The National Cholesterol Education Program (NCEP) has set the  following guidelines (reference values) for triglycerides:  Normal......................<150 mg/dL  Borderline High.............150-199 mg/dL  High........................200-499 mg/dL      HDL 04/22/2024 44  40 - 75 mg/dL Final    Comment: The National Cholesterol Education Program (NCEP) has set the  following guidelines (reference values) for HDL Cholesterol:  Low...............<40 mg/dL  Optimal...........>60 mg/dL      LDL Cholesterol 04/22/2024 101.6  63.0 - 159.0 mg/dL Final    Comment: The National Cholesterol Education Program (NCEP) has set the  following guidelines (reference values) for LDL Cholesterol:  Optimal.......................<130 mg/dL  Borderline High...............130-159 mg/dL  High..........................160-189 mg/dL  Very High.....................>190 mg/dL      HDL/Cholesterol Ratio 04/22/2024 26.3  20.0 - 50.0 % Final    Total Cholesterol/HDL Ratio 04/22/2024 3.8  2.0 - 5.0 Final    Non-HDL Cholesterol 04/22/2024 123  mg/dL Final    Comment: Risk category and Non-HDL cholesterol goals:  Coronary heart disease (CHD)or equivalent (10-year risk of CHD >20%):  Non-HDL cholesterol goal     <130 mg/dL  Two or more CHD risk factors and 10-year risk of CHD <= 20%:  Non-HDL cholesterol goal     <160 mg/dL  0 to 1 CHD risk factor:  Non-HDL cholesterol goal     <190 mg/dL      Sodium 04/22/2024 137  136 - 145 mmol/L Final    Potassium 04/22/2024 4.4  3.5 - 5.1 mmol/L Final    Chloride 04/22/2024 108  95 - 110 mmol/L Final    CO2 04/22/2024 23  23 - 29 mmol/L Final    Glucose 04/22/2024 96  70 - 110 mg/dL Final    BUN 04/22/2024 15  8 - 23 mg/dL Final    Creatinine  04/22/2024 1.1  0.5 - 1.4 mg/dL Final    Calcium 04/22/2024 9.3  8.7 - 10.5 mg/dL Final    Total Protein 04/22/2024 6.8  6.0 - 8.4 g/dL Final    Albumin 04/22/2024 4.1  3.5 - 5.2 g/dL Final    Total Bilirubin 04/22/2024 0.6  0.1 - 1.0 mg/dL Final    Comment: For infants and newborns, interpretation of results should be based  on gestational age, weight and in agreement with clinical  observations.    Premature Infant recommended reference ranges:  Up to 24 hours.............<8.0 mg/dL  Up to 48 hours............<12.0 mg/dL  3-5 days..................<15.0 mg/dL  6-29 days.................<15.0 mg/dL      Alkaline Phosphatase 04/22/2024 66  55 - 135 U/L Final    AST 04/22/2024 12  10 - 40 U/L Final    ALT 04/22/2024 5 (L)  10 - 44 U/L Final    eGFR 04/22/2024 49.2 (A)  >60 mL/min/1.73 m^2 Final    Anion Gap 04/22/2024 6 (L)  8 - 16 mmol/L Final    WBC 04/22/2024 5.02  3.90 - 12.70 K/uL Final    RBC 04/22/2024 4.66  4.00 - 5.40 M/uL Final    Hemoglobin 04/22/2024 12.1  12.0 - 16.0 g/dL Final    Hematocrit 04/22/2024 40.2  37.0 - 48.5 % Final    MCV 04/22/2024 86  82 - 98 fL Final    MCH 04/22/2024 26.0 (L)  27.0 - 31.0 pg Final    MCHC 04/22/2024 30.1 (L)  32.0 - 36.0 g/dL Final    RDW 04/22/2024 17.5 (H)  11.5 - 14.5 % Final    Platelets 04/22/2024 193  150 - 450 K/uL Final    MPV 04/22/2024 11.7  9.2 - 12.9 fL Final    Immature Granulocytes 04/22/2024 0.2  0.0 - 0.5 % Final    Gran # (ANC) 04/22/2024 2.4  1.8 - 7.7 K/uL Final    Immature Grans (Abs) 04/22/2024 0.01  0.00 - 0.04 K/uL Final    Comment: Mild elevation in immature granulocytes is non specific and   can be seen in a variety of conditions including stress response,   acute inflammation, trauma and pregnancy. Correlation with other   laboratory and clinical findings is essential.      Lymph # 04/22/2024 2.1  1.0 - 4.8 K/uL Final    Mono # 04/22/2024 0.4  0.3 - 1.0 K/uL Final    Eos # 04/22/2024 0.1  0.0 - 0.5 K/uL Final    Baso # 04/22/2024 0.01  0.00 -  0.20 K/uL Final    nRBC 04/22/2024 0  0 /100 WBC Final    Gran % 04/22/2024 48.0  38.0 - 73.0 % Final    Lymph % 04/22/2024 42.0  18.0 - 48.0 % Final    Mono % 04/22/2024 7.2  4.0 - 15.0 % Final    Eosinophil % 04/22/2024 2.4  0.0 - 8.0 % Final    Basophil % 04/22/2024 0.2  0.0 - 1.9 % Final    Differential Method 04/22/2024 Automated   Final    Microalbumin, Urine 04/22/2024 28.4 (H)  <19.9 ug/mL Final    Creatinine, Urine 04/22/2024 236.9  15.0 - 325.0 mg/dL Final    Comment: The random urine reference ranges provided were established   for 24 hour urine collections.  No reference ranges exist for  random urine specimens.  Correlate clinically.      Microalb/Creat Ratio 04/22/2024 12.0  0.0 - 30.0 ug/mg Final    RBC, UA 04/22/2024 1  0 - 4 /hpf Final    WBC, UA 04/22/2024 53 (H)  0 - 5 /hpf Final    Bacteria 04/22/2024 Rare  None-Occ /hpf Final    Yeast, UA 04/22/2024 Rare (A)  None Final    Squam Epithel, UA 04/22/2024 1  /hpf Final    Microscopic Comment 04/22/2024 SEE COMMENT   Final    Comment: Other formed elements not mentioned in the report are not   present in the microscopic examination.        Assessment:       1. Stage 3a chronic kidney disease    2. Anxiety    3. Essential hypertension    4. Mixed hyperlipidemia    5. Iron deficiency anemia secondary to inadequate dietary iron intake    6. Pancreatic cyst    7. Urinary tract infection without hematuria, site unspecified        Plan:       Stage 3a chronic kidney disease  Kidney function stable. BP is controlled. Increase fluids to stay hydrated. Limit NSAIDs. Will recheck in 3 months.  If eGFR starts to decline will send to nephrology.   -     CBC Auto Differential; Future; Expected date: 07/23/2024  -     Ferritin; Future; Expected date: 07/23/2024  -     Iron and TIBC; Future; Expected date: 07/23/2024  -     Comprehensive Metabolic Panel; Future; Expected date: 07/23/2024    Anxiety  Stable, continue Xanax 0.25 mg prn.  reviewed. Follow-up in 3  months.   -     ALPRAZolam (XANAX) 0.25 MG tablet; Take 1 tablet (0.25 mg total) by mouth 2 (two) times daily as needed for Anxiety.  Dispense: 60 tablet; Refill: 0    Essential hypertension  Stable, continue amlodipine 10 mg and benazepril 20 mg.  Limit sodium intake.  -     Comprehensive Metabolic Panel; Future; Expected date: 07/23/2024  -     amLODIPine (NORVASC) 10 MG tablet; Take 1 tablet (10 mg total) by mouth once daily.  Dispense: 90 tablet; Refill: 3    Mixed hyperlipidemia  Limit red meat, butter, fried foods, cheese, and other foods that have a lot of saturated fat. Consume more: lean meats, fish, fruits, vegetables, whole grains, beans, lentils, and nuts.   -     Comprehensive Metabolic Panel; Future; Expected date: 07/23/2024    Iron deficiency anemia secondary to inadequate dietary iron intake  Improving, continue iron supplements.   -     CBC Auto Differential; Future; Expected date: 07/23/2024  -     Ferritin; Future; Expected date: 07/23/2024  -     Iron and TIBC; Future; Expected date: 07/23/2024  -     Comprehensive Metabolic Panel; Future; Expected date: 07/23/2024    Pancreatic cyst  -     CT Abdomen Pelvis  Without Contrast; Future; Expected date: 04/23/2024    Urinary tract infection without hematuria, site unspecified  -     ciprofloxacin HCl (CIPRO) 250 MG tablet; Take 1 tablet (250 mg total) by mouth 2 (two) times daily. for 3 days  Dispense: 6 tablet; Refill: 0    This note was created using [employer] voice recognition software that occasionally misinterprets phrases or words.     I spent a total of 27 minutes on the day of the visit.This includes face to face time and non-face to face time preparing to see the patient (eg, review of tests), obtaining and/or reviewing separately obtained history, documenting clinical information in the electronic or other health record, independently interpreting results and communicating results to the patient/family/caregiver, or care  coordinator.    Follow up in about 3 months (around 7/23/2024) for Anxiety.          4/23/2024 ELINA Johnston, FNP

## 2024-04-30 ENCOUNTER — HOSPITAL ENCOUNTER (OUTPATIENT)
Dept: RADIOLOGY | Facility: HOSPITAL | Age: 84
Discharge: HOME | End: 2024-04-30
Attending: NURSE PRACTITIONER
Payer: MEDICARE

## 2024-04-30 DIAGNOSIS — K86.2 PANCREATIC CYST: ICD-10-CM

## 2024-04-30 PROCEDURE — 74176 CT ABD & PELVIS W/O CONTRAST: CPT | Mod: TC,PO

## 2024-04-30 PROCEDURE — 74176 CT ABD & PELVIS W/O CONTRAST: CPT | Mod: 26,,, | Performed by: RADIOLOGY

## 2024-04-30 NOTE — PROGRESS NOTES
Please call patient.  Cystic lesion still present within the body of the pancrease, not much change in size from previous. There is an enlarging left renal cyst. Has see seen GI for the cystic lesion before?

## 2024-05-30 DIAGNOSIS — F41.9 ANXIETY: ICD-10-CM

## 2024-05-31 RX ORDER — ALPRAZOLAM 0.25 MG/1
0.25 TABLET ORAL 2 TIMES DAILY PRN
Qty: 60 TABLET | Refills: 0 | Status: SHIPPED | OUTPATIENT
Start: 2024-05-31

## 2024-06-17 DIAGNOSIS — G89.29 CHRONIC RIGHT HIP PAIN: ICD-10-CM

## 2024-06-17 DIAGNOSIS — M15.9 PRIMARY OSTEOARTHRITIS INVOLVING MULTIPLE JOINTS: ICD-10-CM

## 2024-06-17 DIAGNOSIS — M25.551 CHRONIC RIGHT HIP PAIN: ICD-10-CM

## 2024-06-18 RX ORDER — DICLOFENAC SODIUM 10 MG/G
GEL TOPICAL 2 TIMES DAILY
Qty: 450 G | Refills: 3 | Status: SHIPPED | OUTPATIENT
Start: 2024-06-18

## 2024-07-12 ENCOUNTER — TELEPHONE (OUTPATIENT)
Dept: FAMILY MEDICINE | Facility: CLINIC | Age: 84
End: 2024-07-12
Payer: MEDICARE

## 2024-07-23 ENCOUNTER — OFFICE VISIT (OUTPATIENT)
Dept: FAMILY MEDICINE | Facility: CLINIC | Age: 84
End: 2024-07-23
Payer: MEDICARE

## 2024-07-23 VITALS
HEART RATE: 93 BPM | HEIGHT: 63 IN | BODY MASS INDEX: 20.2 KG/M2 | SYSTOLIC BLOOD PRESSURE: 126 MMHG | DIASTOLIC BLOOD PRESSURE: 76 MMHG | WEIGHT: 114 LBS | OXYGEN SATURATION: 96 %

## 2024-07-23 DIAGNOSIS — K86.2 PANCREATIC CYST: ICD-10-CM

## 2024-07-23 DIAGNOSIS — M15.9 PRIMARY OSTEOARTHRITIS INVOLVING MULTIPLE JOINTS: ICD-10-CM

## 2024-07-23 DIAGNOSIS — K21.9 GASTROESOPHAGEAL REFLUX DISEASE, UNSPECIFIED WHETHER ESOPHAGITIS PRESENT: ICD-10-CM

## 2024-07-23 DIAGNOSIS — F41.9 ANXIETY: Primary | Chronic | ICD-10-CM

## 2024-07-23 DIAGNOSIS — I10 ESSENTIAL HYPERTENSION: ICD-10-CM

## 2024-07-23 PROCEDURE — 99214 OFFICE O/P EST MOD 30 MIN: CPT | Mod: S$GLB,,, | Performed by: NURSE PRACTITIONER

## 2024-07-23 PROCEDURE — 1159F MED LIST DOCD IN RCRD: CPT | Mod: CPTII,S$GLB,, | Performed by: NURSE PRACTITIONER

## 2024-07-23 PROCEDURE — 1101F PT FALLS ASSESS-DOCD LE1/YR: CPT | Mod: CPTII,S$GLB,, | Performed by: NURSE PRACTITIONER

## 2024-07-23 PROCEDURE — 3288F FALL RISK ASSESSMENT DOCD: CPT | Mod: CPTII,S$GLB,, | Performed by: NURSE PRACTITIONER

## 2024-07-23 PROCEDURE — 3074F SYST BP LT 130 MM HG: CPT | Mod: CPTII,S$GLB,, | Performed by: NURSE PRACTITIONER

## 2024-07-23 PROCEDURE — 99999 PR PBB SHADOW E&M-EST. PATIENT-LVL IV: CPT | Mod: PBBFAC,,, | Performed by: NURSE PRACTITIONER

## 2024-07-23 PROCEDURE — 1126F AMNT PAIN NOTED NONE PRSNT: CPT | Mod: CPTII,S$GLB,, | Performed by: NURSE PRACTITIONER

## 2024-07-23 PROCEDURE — 1160F RVW MEDS BY RX/DR IN RCRD: CPT | Mod: CPTII,S$GLB,, | Performed by: NURSE PRACTITIONER

## 2024-07-23 PROCEDURE — 3078F DIAST BP <80 MM HG: CPT | Mod: CPTII,S$GLB,, | Performed by: NURSE PRACTITIONER

## 2024-07-23 RX ORDER — ALPRAZOLAM 0.25 MG/1
0.25 TABLET ORAL 2 TIMES DAILY PRN
Qty: 60 TABLET | Refills: 0 | Status: SHIPPED | OUTPATIENT
Start: 2024-07-23

## 2024-07-23 RX ORDER — AMLODIPINE BESYLATE 10 MG/1
10 TABLET ORAL DAILY
Qty: 90 TABLET | Refills: 3 | Status: SHIPPED | OUTPATIENT
Start: 2024-07-23

## 2024-07-23 RX ORDER — FAMOTIDINE 20 MG/1
20 TABLET, FILM COATED ORAL 2 TIMES DAILY
Qty: 180 TABLET | Refills: 1 | Status: SHIPPED | OUTPATIENT
Start: 2024-07-23 | End: 2025-01-19

## 2024-07-23 RX ORDER — BENAZEPRIL HYDROCHLORIDE 20 MG/1
20 TABLET ORAL DAILY
Qty: 90 TABLET | Refills: 0 | Status: SHIPPED | OUTPATIENT
Start: 2024-07-23

## 2024-07-23 NOTE — PROGRESS NOTES
SUBJECTIVE:      Patient ID: Martha Alonso is a 83 y.o. female.    Chief Complaint: Follow-up and Anxiety    83-year-old female presents to the clinic for 3 month anxiety follow-up. She is present with her daughter. She has been doing well. Anxiety is stable with Xanax 0.25 mg bid. Mood is stable. She plans to see a rheumatologist in September for chronic joint pain. Patient has chronic pain to right hip as well as arthralgia throughout multiple joints. Manages joint pain with Voltaren gel. Uses Celebrex on and off. Blood pressure is stable today, compliant with Amlodipine 10 mg and Benazepril 20 mg. She has follow-up labs that need to be completed.        No family history on file.   Social History     Socioeconomic History    Marital status:    Occupational History    Occupation: retired   Tobacco Use    Smoking status: Never    Smokeless tobacco: Never   Substance and Sexual Activity    Drug use: Never    Sexual activity: Not Currently     Current Outpatient Medications   Medication Sig Dispense Refill    diclofenac sodium (VOLTAREN) 1 % Gel Apply topically 2 (two) times daily. 450 g 3    DULoxetine (CYMBALTA) 30 MG capsule Take 1 capsule (30 mg total) by mouth once daily. 30 capsule 1    ferrous gluconate (FERGON) 324 MG tablet Take 1 tablet (324 mg total) by mouth daily with breakfast. 90 tablet 1    ibuprofen (ADVIL,MOTRIN) 200 MG tablet Take 200 mg by mouth every 6 (six) hours as needed for Pain. As needed for hip pain      ondansetron (ZOFRAN-ODT) 4 MG TbDL Take 1 tablet (4 mg total) by mouth every 12 (twelve) hours as needed (nausea). 21 tablet 0    ALPRAZolam (XANAX) 0.25 MG tablet Take 1 tablet (0.25 mg total) by mouth 2 (two) times daily as needed for Anxiety. 60 tablet 0    amLODIPine (NORVASC) 10 MG tablet Take 1 tablet (10 mg total) by mouth once daily. 90 tablet 3    benazepriL (LOTENSIN) 20 MG tablet Take 1 tablet (20 mg total) by mouth once daily. 90 tablet 0    famotidine (PEPCID) 20  "MG tablet Take 1 tablet (20 mg total) by mouth 2 (two) times daily. 180 tablet 1     No current facility-administered medications for this visit.     Review of patient's allergies indicates:  No Known Allergies   Past Medical History:   Diagnosis Date    Hypertension     Rheumatoid arthritis      History reviewed. No pertinent surgical history.    Review of Systems   Constitutional:  Positive for unexpected weight change (Weight gradually decreasing, daughter started making her eat lunch, which seems to be helping per daughter). Negative for activity change, appetite change, chills, diaphoresis, fatigue and fever.   HENT:  Positive for hearing loss (right ear). Negative for congestion, ear pain, sinus pressure, sore throat, trouble swallowing and voice change.    Eyes:  Negative for pain, discharge and visual disturbance.   Respiratory:  Negative for cough, chest tightness, shortness of breath and wheezing.    Cardiovascular:  Negative for chest pain and palpitations.        HTN and hyperlipidemia (managed with diet)   Gastrointestinal:  Negative for abdominal pain, constipation, diarrhea, nausea and vomiting.   Genitourinary:  Negative for difficulty urinating, flank pain, frequency and urgency.   Musculoskeletal:  Positive for arthralgias and gait problem (right hip pain). Negative for back pain, joint swelling and neck pain.        Joint pain all over   Skin:  Negative for color change and rash.   Neurological:  Negative for dizziness, seizures, syncope, weakness, numbness and headaches.   Hematological:  Negative for adenopathy.        Iron deficiency anemia, compliant with iron supplements   Psychiatric/Behavioral:  Negative for dysphoric mood and sleep disturbance. The patient is nervous/anxious.       OBJECTIVE:      Vitals:    07/23/24 1002   BP: 126/76   BP Location: Left arm   Patient Position: Sitting   BP Method: Medium (Manual)   Pulse: 93   SpO2: 96%   Weight: 51.7 kg (114 lb)   Height: 5' 3" (1.6 m) "     Physical Exam  Vitals and nursing note reviewed.   Constitutional:       General: She is awake. She is not in acute distress.     Appearance: Normal appearance. She is well-developed, well-groomed and normal weight. She is not ill-appearing, toxic-appearing or diaphoretic.   HENT:      Head: Normocephalic and atraumatic.      Nose: Nose normal.   Eyes:      General: Lids are normal. Gaze aligned appropriately.      Conjunctiva/sclera: Conjunctivae normal.      Right eye: Right conjunctiva is not injected.      Left eye: Left conjunctiva is not injected.      Pupils: Pupils are equal, round, and reactive to light.   Cardiovascular:      Rate and Rhythm: Normal rate and regular rhythm.      Pulses: Normal pulses.      Heart sounds: Normal heart sounds, S1 normal and S2 normal. No murmur heard.     No friction rub. No gallop.   Pulmonary:      Effort: Pulmonary effort is normal. No respiratory distress.      Breath sounds: Normal breath sounds. No stridor. No decreased breath sounds, wheezing, rhonchi or rales.   Chest:      Chest wall: No tenderness.   Musculoskeletal:      Cervical back: Neck supple.      Right lower leg: No edema.      Left lower leg: No edema.   Lymphadenopathy:      Cervical: No cervical adenopathy.   Skin:     General: Skin is warm and dry.      Capillary Refill: Capillary refill takes less than 2 seconds.      Findings: No erythema or rash.   Neurological:      Mental Status: She is alert and oriented to person, place, and time. Mental status is at baseline.   Psychiatric:         Attention and Perception: Attention normal.         Mood and Affect: Mood normal.         Speech: Speech normal.         Behavior: Behavior normal. Behavior is cooperative.         Thought Content: Thought content normal.         Judgment: Judgment normal.        Lab Visit on 04/22/2024   Component Date Value Ref Range Status    Iron 04/22/2024 41  30 - 160 ug/dL Final    Transferrin 04/22/2024 265  200 - 375 mg/dL  Final    TIBC 04/22/2024 371  250 - 450 ug/dL Final    Saturated Iron 04/22/2024 11 (L)  20 - 50 % Final    Ferritin 04/22/2024 11.0 (L)  20.0 - 300.0 ng/mL Final    Specimen UA 04/22/2024 Urine, Clean Catch   Final    Color, UA 04/22/2024 Yellow  Yellow, Straw, [place] Final    Appearance, UA 04/22/2024 Hazy (A)  Clear Final    pH, UA 04/22/2024 6.0  5.0 - 8.0 Final    Specific Gravity, UA 04/22/2024 1.025  1.005 - 1.030 Final    Protein, UA 04/22/2024 Trace (A)  Negative Final    Comment: Recommend a 24 hour urine protein or a urine   protein/creatinine ratio if globulin induced proteinuria is  clinically suspected.      Glucose, UA 04/22/2024 Negative  Negative Final    Ketones, UA 04/22/2024 Negative  Negative Final    Bilirubin (UA) 04/22/2024 Negative  Negative Final    Occult Blood UA 04/22/2024 Negative  Negative Final    Nitrite, UA 04/22/2024 Positive (A)  Negative Final    Urobilinogen, UA 04/22/2024 2.0-3.0 (A)  Negative EU/dL Final    Leukocytes, UA 04/22/2024 2+ (A)  Negative Final    TSH 04/22/2024 1.202  0.340 - 5.600 uIU/mL Final    Cholesterol 04/22/2024 167  120 - 199 mg/dL Final    Comment: The National Cholesterol Education Program (NCEP) has set the  following guidelines (reference ranges) for Cholesterol:  Optimal.....................<200 mg/dL  Borderline High.............200-239 mg/dL  High........................> or = 240 mg/dL      Triglycerides 04/22/2024 107  30 - 150 mg/dL Final    Comment: The National Cholesterol Education Program (NCEP) has set the  following guidelines (reference values) for triglycerides:  Normal......................<150 mg/dL  Borderline High.............150-199 mg/dL  High........................200-499 mg/dL      HDL 04/22/2024 44  40 - 75 mg/dL Final    Comment: The National Cholesterol Education Program (NCEP) has set the  following guidelines (reference values) for HDL Cholesterol:  Low...............<40 mg/dL  Optimal...........>60 mg/dL      LDL Cholesterol  04/22/2024 101.6  63.0 - 159.0 mg/dL Final    Comment: The National Cholesterol Education Program (NCEP) has set the  following guidelines (reference values) for LDL Cholesterol:  Optimal.......................<130 mg/dL  Borderline High...............130-159 mg/dL  High..........................160-189 mg/dL  Very High.....................>190 mg/dL      HDL/Cholesterol Ratio 04/22/2024 26.3  20.0 - 50.0 % Final    Total Cholesterol/HDL Ratio 04/22/2024 3.8  2.0 - 5.0 Final    Non-HDL Cholesterol 04/22/2024 123  mg/dL Final    Comment: Risk category and Non-HDL cholesterol goals:  Coronary heart disease (CHD)or equivalent (10-year risk of CHD >20%):  Non-HDL cholesterol goal     <130 mg/dL  Two or more CHD risk factors and 10-year risk of CHD <= 20%:  Non-HDL cholesterol goal     <160 mg/dL  0 to 1 CHD risk factor:  Non-HDL cholesterol goal     <190 mg/dL      Sodium 04/22/2024 137  136 - 145 mmol/L Final    Potassium 04/22/2024 4.4  3.5 - 5.1 mmol/L Final    Chloride 04/22/2024 108  95 - 110 mmol/L Final    CO2 04/22/2024 23  23 - 29 mmol/L Final    Glucose 04/22/2024 96  70 - 110 mg/dL Final    BUN 04/22/2024 15  8 - 23 mg/dL Final    Creatinine 04/22/2024 1.1  0.5 - 1.4 mg/dL Final    Calcium 04/22/2024 9.3  8.7 - 10.5 mg/dL Final    Total Protein 04/22/2024 6.8  6.0 - 8.4 g/dL Final    Albumin 04/22/2024 4.1  3.5 - 5.2 g/dL Final    Total Bilirubin 04/22/2024 0.6  0.1 - 1.0 mg/dL Final    Comment: For infants and newborns, interpretation of results should be based  on gestational age, weight and in agreement with clinical  observations.    Premature Infant recommended reference ranges:  Up to 24 hours.............<8.0 mg/dL  Up to 48 hours............<12.0 mg/dL  3-5 days..................<15.0 mg/dL  6-29 days.................<15.0 mg/dL      Alkaline Phosphatase 04/22/2024 66  55 - 135 U/L Final    AST 04/22/2024 12  10 - 40 U/L Final    ALT 04/22/2024 5 (L)  10 - 44 U/L Final    eGFR 04/22/2024 49.2 (A)  >60  mL/min/1.73 m^2 Final    Anion Gap 04/22/2024 6 (L)  8 - 16 mmol/L Final    WBC 04/22/2024 5.02  3.90 - 12.70 K/uL Final    RBC 04/22/2024 4.66  4.00 - 5.40 M/uL Final    Hemoglobin 04/22/2024 12.1  12.0 - 16.0 g/dL Final    Hematocrit 04/22/2024 40.2  37.0 - 48.5 % Final    MCV 04/22/2024 86  82 - 98 fL Final    MCH 04/22/2024 26.0 (L)  27.0 - 31.0 pg Final    MCHC 04/22/2024 30.1 (L)  32.0 - 36.0 g/dL Final    RDW 04/22/2024 17.5 (H)  11.5 - 14.5 % Final    Platelets 04/22/2024 193  150 - 450 K/uL Final    MPV 04/22/2024 11.7  9.2 - 12.9 fL Final    Immature Granulocytes 04/22/2024 0.2  0.0 - 0.5 % Final    Gran # (ANC) 04/22/2024 2.4  1.8 - 7.7 K/uL Final    Immature Grans (Abs) 04/22/2024 0.01  0.00 - 0.04 K/uL Final    Comment: Mild elevation in immature granulocytes is non specific and   can be seen in a variety of conditions including stress response,   acute inflammation, trauma and pregnancy. Correlation with other   laboratory and clinical findings is essential.      Lymph # 04/22/2024 2.1  1.0 - 4.8 K/uL Final    Mono # 04/22/2024 0.4  0.3 - 1.0 K/uL Final    Eos # 04/22/2024 0.1  0.0 - 0.5 K/uL Final    Baso # 04/22/2024 0.01  0.00 - 0.20 K/uL Final    nRBC 04/22/2024 0  0 /100 WBC Final    Gran % 04/22/2024 48.0  38.0 - 73.0 % Final    Lymph % 04/22/2024 42.0  18.0 - 48.0 % Final    Mono % 04/22/2024 7.2  4.0 - 15.0 % Final    Eosinophil % 04/22/2024 2.4  0.0 - 8.0 % Final    Basophil % 04/22/2024 0.2  0.0 - 1.9 % Final    Differential Method 04/22/2024 Automated   Final    Microalbumin, Urine 04/22/2024 28.4 (H)  <19.9 ug/mL Final    Creatinine, Urine 04/22/2024 236.9  15.0 - 325.0 mg/dL Final    Comment: The random urine reference ranges provided were established   for 24 hour urine collections.  No reference ranges exist for  random urine specimens.  Correlate clinically.      Microalb/Creat Ratio 04/22/2024 12.0  0.0 - 30.0 ug/mg Final    RBC, UA 04/22/2024 1  0 - 4 /hpf Final    WBC, UA 04/22/2024  53 (H)  0 - 5 /hpf Final    Bacteria 04/22/2024 Rare  None-Occ /hpf Final    Yeast, UA 04/22/2024 Rare (A)  None Final    Squam Epithel, UA 04/22/2024 1  /hpf Final    Microscopic Comment 04/22/2024 SEE COMMENT   Final    Comment: Other formed elements not mentioned in the report are not   present in the microscopic examination.      ]  Assessment:       1. Anxiety    2. Essential hypertension    3. Primary osteoarthritis involving multiple joints    4. Pancreatic cyst        Plan:       Anxiety  Stable, continue Xanax 0.25 mg.  reviewed. Follow-up in 3 months.   -     ALPRAZolam (XANAX) 0.25 MG tablet; Take 1 tablet (0.25 mg total) by mouth 2 (two) times daily as needed for Anxiety.  Dispense: 60 tablet; Refill: 0    Essential hypertension  Stable, BP controlled. Continue Amlodipine 10 mg and Benazepril 20 mg. Reduce the amount of salt in your diet. Continue home BP monitoring.  -     amLODIPine (NORVASC) 10 MG tablet; Take 1 tablet (10 mg total) by mouth once daily.  Dispense: 90 tablet; Refill: 3  -     benazepriL (LOTENSIN) 20 MG tablet; Take 1 tablet (20 mg total) by mouth once daily.  Dispense: 90 tablet; Refill: 0    Primary osteoarthritis involving multiple joints  Follow-up with rheumatology in September. Continue Celebrex and Voltaren gel.    Pancreatic cyst  Referred to Dr. Prieto for 2nd opinion.   -     Ambulatory referral/consult to Gastroenterology; Future; Expected date: 07/30/2024    This note was created using [employer] voice recognition software that occasionally misinterprets phrases or words.     I spent a total of 21 minutes on the day of the visit.This includes face to face time and non-face to face time preparing to see the patient (eg, review of tests), obtaining and/or reviewing separately obtained history, documenting clinical information in the electronic or other health record, independently interpreting results and communicating results to the patient/family/caregiver, or care  coordinator.    Follow up in about 3 months (around 10/23/2024) for Anxiety.          7/23/2024 ELINA Johnston, FNP

## 2024-08-27 DIAGNOSIS — F41.9 ANXIETY: Chronic | ICD-10-CM

## 2024-08-27 RX ORDER — ALPRAZOLAM 0.25 MG/1
0.25 TABLET ORAL 2 TIMES DAILY PRN
Qty: 60 TABLET | Refills: 0 | Status: SHIPPED | OUTPATIENT
Start: 2024-08-27

## 2024-08-29 NOTE — PROGRESS NOTES
Please call patient. UA consisted with UTI, see if patient is having symptoms.  Will discuss other labs at follow-up.  right rail up/left rail down

## 2024-09-23 ENCOUNTER — OFFICE VISIT (OUTPATIENT)
Dept: RHEUMATOLOGY | Facility: CLINIC | Age: 84
End: 2024-09-23
Payer: MEDICARE

## 2024-09-23 VITALS
BODY MASS INDEX: 20.31 KG/M2 | HEART RATE: 86 BPM | SYSTOLIC BLOOD PRESSURE: 159 MMHG | WEIGHT: 114.63 LBS | HEIGHT: 63 IN | DIASTOLIC BLOOD PRESSURE: 79 MMHG

## 2024-09-23 DIAGNOSIS — M79.18 MYALGIA, OTHER SITE: ICD-10-CM

## 2024-09-23 DIAGNOSIS — M67.951 TENDINOPATHY OF RIGHT GLUTEUS MEDIUS: ICD-10-CM

## 2024-09-23 DIAGNOSIS — M25.50 POLYARTHRALGIA: ICD-10-CM

## 2024-09-23 PROCEDURE — 1101F PT FALLS ASSESS-DOCD LE1/YR: CPT | Mod: CPTII,S$GLB,, | Performed by: STUDENT IN AN ORGANIZED HEALTH CARE EDUCATION/TRAINING PROGRAM

## 2024-09-23 PROCEDURE — 1125F AMNT PAIN NOTED PAIN PRSNT: CPT | Mod: CPTII,S$GLB,, | Performed by: STUDENT IN AN ORGANIZED HEALTH CARE EDUCATION/TRAINING PROGRAM

## 2024-09-23 PROCEDURE — 3078F DIAST BP <80 MM HG: CPT | Mod: CPTII,S$GLB,, | Performed by: STUDENT IN AN ORGANIZED HEALTH CARE EDUCATION/TRAINING PROGRAM

## 2024-09-23 PROCEDURE — 3288F FALL RISK ASSESSMENT DOCD: CPT | Mod: CPTII,S$GLB,, | Performed by: STUDENT IN AN ORGANIZED HEALTH CARE EDUCATION/TRAINING PROGRAM

## 2024-09-23 PROCEDURE — 20552 NJX 1/MLT TRIGGER POINT 1/2: CPT | Mod: S$GLB,,, | Performed by: STUDENT IN AN ORGANIZED HEALTH CARE EDUCATION/TRAINING PROGRAM

## 2024-09-23 PROCEDURE — 1159F MED LIST DOCD IN RCRD: CPT | Mod: CPTII,S$GLB,, | Performed by: STUDENT IN AN ORGANIZED HEALTH CARE EDUCATION/TRAINING PROGRAM

## 2024-09-23 PROCEDURE — 99204 OFFICE O/P NEW MOD 45 MIN: CPT | Mod: 25,S$GLB,, | Performed by: STUDENT IN AN ORGANIZED HEALTH CARE EDUCATION/TRAINING PROGRAM

## 2024-09-23 PROCEDURE — 3077F SYST BP >= 140 MM HG: CPT | Mod: CPTII,S$GLB,, | Performed by: STUDENT IN AN ORGANIZED HEALTH CARE EDUCATION/TRAINING PROGRAM

## 2024-09-23 PROCEDURE — 99999 PR PBB SHADOW E&M-EST. PATIENT-LVL IV: CPT | Mod: PBBFAC,,, | Performed by: STUDENT IN AN ORGANIZED HEALTH CARE EDUCATION/TRAINING PROGRAM

## 2024-09-23 RX ORDER — TRIAMCINOLONE ACETONIDE 40 MG/ML
40 INJECTION, SUSPENSION INTRA-ARTICULAR; INTRAMUSCULAR
Status: DISCONTINUED | OUTPATIENT
Start: 2024-09-23 | End: 2024-09-23 | Stop reason: HOSPADM

## 2024-09-23 RX ORDER — LIDOCAINE HYDROCHLORIDE 10 MG/ML
1 INJECTION, SOLUTION EPIDURAL; INFILTRATION; INTRACAUDAL; PERINEURAL
Status: DISCONTINUED | OUTPATIENT
Start: 2024-09-23 | End: 2024-09-23 | Stop reason: HOSPADM

## 2024-09-23 RX ADMIN — TRIAMCINOLONE ACETONIDE 40 MG: 40 INJECTION, SUSPENSION INTRA-ARTICULAR; INTRAMUSCULAR at 02:09

## 2024-09-23 RX ADMIN — LIDOCAINE HYDROCHLORIDE 1 ML: 10 INJECTION, SOLUTION EPIDURAL; INFILTRATION; INTRACAUDAL; PERINEURAL at 02:09

## 2024-09-23 NOTE — PROGRESS NOTES
9/23/2024    11:26 AM   Rapid3 Question Responses and Scores   MDHAQ Score 0.7   Psychologic Score 2.2   Pain Score 7   When you awakened in the morning OVER THE LAST WEEK, did you feel stiff? Yes   If Yes, please indicate the number of hours until you are as limber as you will be for the day 1   Fatigue Score 0   Global Health Score 6   RAPID3 Score 5.11

## 2024-09-23 NOTE — PROGRESS NOTES
RHEUMATOLOGY OUTPATIENT CLINIC NOTE    9/23/2024    Attending Rheumatologist: Christine Santiago  Primary Care Provider: Pedro Pablo Coronado FNP-C   Physician Requesting Consultation: Pedro Pablo Coronado FNP-C  [address] [facility]  [address]  [place] [address]  Chief Complaint/Reason For Consultation:  Joint Pain      Subjective:       HPI  Martha Alonso is a 83 y.o. White female who comes for evaluation of joint pain.     For the past 2 years or so, she has been having pain in right buttock area and lateral hip, no radiation. Occasionally has pain in the left side but right side is worse. Worse with walking and standing washing dishes. Denies any low back pain.   She has seen orthopedics in 3/2023 and had right greater trochanteric bursa injection but it did bit help. She did physical therapy   She also reports pain around right scapula when cooking, moping. Denies any shoulder pain, neck pain.   Reports pain in toes when walking, has noted some swelling in the toes at times, but this does not bother her.   Denies any hand, wrist, elbows pain.   Denies any morning stiffness. She is able to reach arms above head and get up from seated position.   Denies weight loss, oral ulcers, CP, SOB    She reports that she was told at some point that she had elevated rheumatoid factor.     Nephew with RA. He is my patient.     Denies any smoking or vaping.   Denies alcohol use  Denies illicit drugs.     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     Review of Systems   Constitutional:  Negative for fever and unexpected weight change.   HENT:  Negative for mouth sores and trouble swallowing.    Eyes:  Positive for redness.   Respiratory:  Negative for cough and shortness of breath.    Cardiovascular:  Negative for chest pain.   Gastrointestinal:  Positive for constipation. Negative for diarrhea.   Genitourinary:  Negative for dysuria and genital sores.   Integumentary:   Negative for rash.   Neurological:  Negative for headaches.   Hematological:  Does not bruise/bleed easily.        Chronic comorbid conditions affecting medical decision making today:  Past Medical History:   Diagnosis Date    Hypertension     Rheumatoid arthritis      History reviewed. No pertinent surgical history.  No family history on file.  Social History     Substance and Sexual Activity   Alcohol Use Not Currently     Social History     Tobacco Use   Smoking Status Never   Smokeless Tobacco Never     Social History     Substance and Sexual Activity   Drug Use Never       Current Outpatient Medications:     ALPRAZolam (XANAX) 0.25 MG tablet, Take 1 tablet (0.25 mg total) by mouth 2 (two) times daily as needed for Anxiety., Disp: 60 tablet, Rfl: 0    amLODIPine (NORVASC) 10 MG tablet, Take 1 tablet (10 mg total) by mouth once daily., Disp: 90 tablet, Rfl: 3    benazepriL (LOTENSIN) 20 MG tablet, Take 1 tablet (20 mg total) by mouth once daily., Disp: 90 tablet, Rfl: 0    diclofenac sodium (VOLTAREN) 1 % Gel, Apply topically 2 (two) times daily., Disp: 450 g, Rfl: 3    famotidine (PEPCID) 20 MG tablet, Take 1 tablet (20 mg total) by mouth 2 (two) times daily., Disp: 180 tablet, Rfl: 1    ferrous gluconate (FERGON) 324 MG tablet, Take 1 tablet (324 mg total) by mouth daily with breakfast., Disp: 90 tablet, Rfl: 1    ibuprofen (ADVIL,MOTRIN) 200 MG tablet, Take 200 mg by mouth every 6 (six) hours as needed for Pain. As needed for hip pain, Disp: , Rfl:     ondansetron (ZOFRAN-ODT) 4 MG TbDL, Take 1 tablet (4 mg total) by mouth every 12 (twelve) hours as needed (nausea)., Disp: 21 tablet, Rfl: 0    DULoxetine (CYMBALTA) 30 MG capsule, Take 1 capsule (30 mg total) by mouth once daily., Disp: 30 capsule, Rfl: 1     Objective:         Vitals:    09/23/24 1427   BP: (!) 159/79   Pulse: 86     Physical Exam   Constitutional: She is oriented to person, place, and time. She appears well-developed.   HENT:   Head:  "Normocephalic.   Pulmonary/Chest: Effort normal.   Abdominal: Soft.   Musculoskeletal:      Cervical back: Neck supple.      Comments: Cspine FROM no tenderness.   Tspine FROM no tenderness  Lspine FROM no tenderness.  Shoulders: FROM; no synovitis;  Elbows: FROM; no synovitis; no tophi or nodules  Wrists: FROM; no synovitis;    MCPs: FROM; no synovitis;   PIPs:FROM; no synovitis;   DIPs: FROM; no synovitis; multiple heberden nodes.   HIPS: FROM  Knees: FROM; no synovitis; no instability  Ankles: FROM: no synovitis   Toes: no tenderness on palpation.       Significant tenderness in right trapezius muscle.   Point tenderness in right buttock.    Lymphadenopathy:     She has no cervical adenopathy.   Neurological: She is alert and oriented to person, place, and time.   Skin: No rash noted.   Vitals reviewed.      Reviewed old and all outside pertinent medical records available.    All lab results personally reviewed and interpreted by me.  Lab Results   Component Value Date    WBC 5.02 04/22/2024    HGB 12.1 04/22/2024    HCT 40.2 04/22/2024    MCV 86 04/22/2024    MCH 26.0 (L) 04/22/2024    MCHC 30.1 (L) 04/22/2024    RDW 17.5 (H) 04/22/2024     04/22/2024    MPV 11.7 04/22/2024       Lab Results   Component Value Date     04/22/2024    K 4.4 04/22/2024     04/22/2024    CO2 23 04/22/2024    GLU 96 04/22/2024    BUN 15 04/22/2024    CALCIUM 9.3 04/22/2024    PROT 6.8 04/22/2024    ALBUMIN 4.1 04/22/2024    BILITOT 0.6 04/22/2024    AST 12 04/22/2024    ALKPHOS 66 04/22/2024    ALT 5 (L) 04/22/2024       Lab Results   Component Value Date    COLORU Yellow 04/22/2024    APPEARANCEUA Hazy (A) 04/22/2024    SPECGRAV 1.025 04/22/2024    PHUR 6.0 04/22/2024    PROTEINUA Trace (A) 04/22/2024    KETONESU Negative 04/22/2024    LEUKOCYTESUR 2+ (A) 04/22/2024    NITRITE Positive (A) 04/22/2024    UROBILINOGEN 2.0-3.0 (A) 04/22/2024       No results found for: "CRP"    No results found for: "TRANG", "RF", " ""SEDRATE", "CCPANTIBODIE"    No components found for: "25OHVITDTOT", "15UPJHSM3", "29VCJRNL9", "METHODNOTE"    No results found for: "URICACID"    No results found for: "QUANTIFERON", "HEPBCOREIGG", "HEPBSAB", "HEPBSAG", "HEPCAB"    Imaging:  All imaging reviewed and independently interpreted by me.     ASSESSMENT / PLAN:     Martha Alonso is a 83 y.o. White female with:    1. Tendinopathy of right gluteus medius  -this is her biggest concern. Not true hip joint pain or greater trochanteric bursa pain. Symptoms did not improve with bursa injection.   -discussed with patient that I think her symptoms are likely from tendinitis.   -XR bilateral hips showed degenerative changes  -advised her to use tylenol 1 gr up to 3 times per day especially on days that she is very active as it seems to be aggravated by activities.   -continue voltaren gel as needed.   -avoid systemic NSAIDS due to CKD  -continue heating pad   -referral to physical therapy  -we could consider obtaining MRI of the pelvis but pt declined.     2. Myalgia other site  -she has significant myalgias with spasms in right trapezius. Likely due to poor posture.   -will proceed with trigger point injection now. See procedure note.     3. Polyarthralgia  -she has been dealing with chronic pain in feet, with intermittent swelling. She has been told in the past RF is positive but has never been on any treatment  -will obtain autoimmune labs when she is due for next labs with her PCP. Above symptoms are not very severe, therefore she may not need treatment but will monitor.     Follow up in about 4 months (around 1/23/2025).    Method of contact with patient concerns: [facility]    Disclaimer:  This note is prepared using voice recognition software and as such is likely to have errors and has not been proof read. Please contact me for questions.     Time spent: 45 minutes in face to face discussion concerning diagnosis, prognosis, review of lab and " test results, benefits of treatment as well as management of disease, counseling of patient and coordination of care between various health care providers.  Greater than half the time spent was used for coordination of care and counseling of patient.    Christine Santiago M.D.  Rheumatology  [facility]

## 2024-09-23 NOTE — PROCEDURES
Trigger Point Injection    Performed by: Christine Santiago MD  Authorized by: Christine Santiago MD    Trapezus:  Right    Consent Done?:  Yes (Written)    Pre-Procedure:   Indications:  Pain relief      Local anesthesia used?: No    Medications: 1 mL LIDOcaine (PF) 10 mg/ml (1%) 10 mg/mL (1 %); 40 mg triamcinolone acetonide 40 mg/mL

## 2024-10-17 DIAGNOSIS — I10 ESSENTIAL HYPERTENSION: ICD-10-CM

## 2024-10-17 DIAGNOSIS — F41.9 ANXIETY: Chronic | ICD-10-CM

## 2024-10-17 RX ORDER — AMLODIPINE BESYLATE 10 MG/1
10 TABLET ORAL DAILY
Qty: 90 TABLET | Refills: 3 | Status: CANCELLED | OUTPATIENT
Start: 2024-10-17

## 2024-10-17 RX ORDER — ALPRAZOLAM 0.25 MG/1
0.25 TABLET ORAL 2 TIMES DAILY PRN
Qty: 60 TABLET | Refills: 0 | Status: SHIPPED | OUTPATIENT
Start: 2024-10-17

## 2024-10-17 RX ORDER — BENAZEPRIL HYDROCHLORIDE 20 MG/1
20 TABLET ORAL DAILY
Qty: 90 TABLET | Refills: 0 | Status: SHIPPED | OUTPATIENT
Start: 2024-10-17

## 2024-10-23 ENCOUNTER — OFFICE VISIT (OUTPATIENT)
Dept: FAMILY MEDICINE | Facility: CLINIC | Age: 84
End: 2024-10-23
Payer: MEDICARE

## 2024-10-23 VITALS
WEIGHT: 110.88 LBS | HEIGHT: 63 IN | BODY MASS INDEX: 19.64 KG/M2 | HEART RATE: 80 BPM | TEMPERATURE: 99 F | OXYGEN SATURATION: 98 % | SYSTOLIC BLOOD PRESSURE: 133 MMHG | DIASTOLIC BLOOD PRESSURE: 83 MMHG

## 2024-10-23 DIAGNOSIS — I10 ESSENTIAL HYPERTENSION: Primary | ICD-10-CM

## 2024-10-23 DIAGNOSIS — N18.31 STAGE 3A CHRONIC KIDNEY DISEASE: ICD-10-CM

## 2024-10-23 DIAGNOSIS — M15.0 PRIMARY OSTEOARTHRITIS INVOLVING MULTIPLE JOINTS: ICD-10-CM

## 2024-10-23 DIAGNOSIS — F41.9 ANXIETY: ICD-10-CM

## 2024-10-23 DIAGNOSIS — M25.551 CHRONIC RIGHT HIP PAIN: ICD-10-CM

## 2024-10-23 DIAGNOSIS — D50.8 IRON DEFICIENCY ANEMIA SECONDARY TO INADEQUATE DIETARY IRON INTAKE: ICD-10-CM

## 2024-10-23 DIAGNOSIS — G89.29 CHRONIC RIGHT HIP PAIN: ICD-10-CM

## 2024-10-23 DIAGNOSIS — E78.2 MIXED HYPERLIPIDEMIA: ICD-10-CM

## 2024-10-23 PROCEDURE — 1101F PT FALLS ASSESS-DOCD LE1/YR: CPT | Mod: CPTII,S$GLB,, | Performed by: NURSE PRACTITIONER

## 2024-10-23 PROCEDURE — 1160F RVW MEDS BY RX/DR IN RCRD: CPT | Mod: CPTII,S$GLB,, | Performed by: NURSE PRACTITIONER

## 2024-10-23 PROCEDURE — 3288F FALL RISK ASSESSMENT DOCD: CPT | Mod: CPTII,S$GLB,, | Performed by: NURSE PRACTITIONER

## 2024-10-23 PROCEDURE — G2211 COMPLEX E/M VISIT ADD ON: HCPCS | Mod: 95,S$GLB,, | Performed by: NURSE PRACTITIONER

## 2024-10-23 PROCEDURE — 1159F MED LIST DOCD IN RCRD: CPT | Mod: CPTII,S$GLB,, | Performed by: NURSE PRACTITIONER

## 2024-10-23 PROCEDURE — 99214 OFFICE O/P EST MOD 30 MIN: CPT | Mod: S$GLB,,, | Performed by: NURSE PRACTITIONER

## 2024-10-23 PROCEDURE — 99999 PR PBB SHADOW E&M-EST. PATIENT-LVL III: CPT | Mod: PBBFAC,,, | Performed by: NURSE PRACTITIONER

## 2024-10-23 PROCEDURE — 1126F AMNT PAIN NOTED NONE PRSNT: CPT | Mod: CPTII,S$GLB,, | Performed by: NURSE PRACTITIONER

## 2024-10-23 RX ORDER — DICLOFENAC SODIUM 10 MG/G
GEL TOPICAL 2 TIMES DAILY
Qty: 450 G | Refills: 3 | Status: SHIPPED | OUTPATIENT
Start: 2024-10-23

## 2024-10-23 NOTE — PROGRESS NOTES
SUBJECTIVE:      Patient ID: Martha Alonso is a 84 y.o. female.    Chief Complaint: Follow-up (3 mon f/u)    History of Present Illness    CHIEF COMPLAINT:  Ms. Alonso presents for a follow-up visit regarding anxiety and to discuss recent rheumatology consultation findings. She is present with her Daughter.    HPI:  Ms. Alonso has been managing anxiety with Xanax, which appears to be effective. She recently saw a rheumatologist in September who administered two steroid injections in her right shoulder, more towards the back, providing some relief. 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 A follow-up visit with the rheumatologist is planned for January.    Ms. Alonso's weight has been trending downward over the past 6 months, with a total loss of 7 lbs. Her current weight is 110 lbs, and her BMI is 19.65. Her eating habits have changed; she often skips lunch and sometimes only eats dinner. She consumes grits in the morning to take her iron supplements.    Ms. Alonso continues to take iron supplements. She denies leg swelling and palpitations. Her anxiety appears to be well-controlled.    MEDICATIONS:  Ms. Alonso is on Xanax for anxiety management. She is also taking Benazepril and Amlodipine. She uses Voltaren gel and is on iron supplements.    MEDICAL HISTORY:  Ms. Alonso has a history of anxiety and arthritis.        Review of Systems   Constitutional:  Positive for unexpected weight change (Weight gradually decreasingt). Negative for activity change, appetite change, chills, diaphoresis, fatigue and fever.   HENT:  Positive for hearing loss (right ear). Negative for congestion, ear pain, sinus pressure, sore throat, trouble swallowing and voice change.    Eyes:  Negative for pain, discharge and visual disturbance.   Respiratory:  Negative for cough, chest tightness, shortness of breath and  wheezing.    Cardiovascular:  Negative for chest pain and palpitations.        HTN and hyperlipidemia (managed with diet)   Gastrointestinal:  Negative for abdominal pain, constipation, diarrhea, nausea and vomiting.   Genitourinary:  Negative for difficulty urinating, flank pain, frequency and urgency.   Musculoskeletal:  Positive for arthralgias and gait problem (right hip pain). Negative for back pain, joint swelling and neck pain.        Joint pain all over   Skin:  Negative for color change and rash.   Neurological:  Negative for dizziness, seizures, syncope, weakness, numbness and headaches.   Hematological:  Negative for adenopathy.        Iron deficiency anemia, compliant with iron supplements   Psychiatric/Behavioral:  Negative for dysphoric mood and sleep disturbance. The patient is nervous/anxious.        No family history on file.   Social History     Socioeconomic History    Marital status:    Occupational History    Occupation: retired   Tobacco Use    Smoking status: Never    Smokeless tobacco: Never   Substance and Sexual Activity    Alcohol use: Not Currently    Drug use: Never    Sexual activity: Not Currently     Current Outpatient Medications   Medication Sig Dispense Refill    ALPRAZolam (XANAX) 0.25 MG tablet Take 1 tablet (0.25 mg total) by mouth 2 (two) times daily as needed for Anxiety. 60 tablet 0    amLODIPine (NORVASC) 10 MG tablet Take 1 tablet (10 mg total) by mouth once daily. 90 tablet 3    benazepriL (LOTENSIN) 20 MG tablet Take 1 tablet (20 mg total) by mouth once daily. 90 tablet 0    famotidine (PEPCID) 20 MG tablet Take 1 tablet (20 mg total) by mouth 2 (two) times daily. 180 tablet 1    ferrous gluconate (FERGON) 324 MG tablet Take 1 tablet (324 mg total) by mouth daily with breakfast. 90 tablet 1    ibuprofen (ADVIL,MOTRIN) 200 MG tablet Take 200 mg by mouth every 6 (six) hours as needed for Pain. As needed for hip pain      ondansetron (ZOFRAN-ODT) 4 MG TbDL Take 1  "tablet (4 mg total) by mouth every 12 (twelve) hours as needed (nausea). 21 tablet 0    diclofenac sodium (VOLTAREN) 1 % Gel Apply topically 2 (two) times daily. 450 g 3     No current facility-administered medications for this visit.     Review of patient's allergies indicates:  No Known Allergies   Past Medical History:   Diagnosis Date    Hypertension     Rheumatoid arthritis      No past surgical history on file.       OBJECTIVE:      Vitals:    10/23/24 1029   BP: 133/83   BP Location: Right arm   Patient Position: Sitting   Pulse: 80   Temp: 98.6 °F (37 °C)   TempSrc: Oral   SpO2: 98%   Weight: 50.3 kg (110 lb 14.4 oz)   Height: 5' 3" (1.6 m)     Physical Exam  Vitals and nursing note reviewed.   Constitutional:       General: She is awake. She is not in acute distress.     Appearance: Normal appearance. She is well-developed, well-groomed and normal weight. She is not ill-appearing, toxic-appearing or diaphoretic.   HENT:      Head: Normocephalic and atraumatic.      Nose: Nose normal.   Eyes:      General: Lids are normal. Gaze aligned appropriately.      Conjunctiva/sclera: Conjunctivae normal.      Right eye: Right conjunctiva is not injected.      Left eye: Left conjunctiva is not injected.      Pupils: Pupils are equal, round, and reactive to light.   Cardiovascular:      Rate and Rhythm: Normal rate and regular rhythm.      Pulses: Normal pulses.      Heart sounds: Normal heart sounds, S1 normal and S2 normal. No murmur heard.     No friction rub. No gallop.   Pulmonary:      Effort: Pulmonary effort is normal. No respiratory distress.      Breath sounds: Normal breath sounds. No stridor. No decreased breath sounds, wheezing, rhonchi or rales.   Chest:      Chest wall: No tenderness.   Musculoskeletal:      Cervical back: Neck supple.      Right lower leg: No edema.      Left lower leg: No edema.   Lymphadenopathy:      Cervical: No cervical adenopathy.   Skin:     General: Skin is warm and dry.      " Capillary Refill: Capillary refill takes less than 2 seconds.      Findings: No erythema or rash.   Neurological:      Mental Status: She is alert and oriented to person, place, and time. Mental status is at baseline.   Psychiatric:         Attention and Perception: Attention normal.         Mood and Affect: Mood normal.         Speech: Speech normal.         Behavior: Behavior normal. Behavior is cooperative.         Thought Content: Thought content normal.         Judgment: Judgment normal.       Lab Visit on 04/22/2024   Component Date Value Ref Range Status    Iron 04/22/2024 41  30 - 160 ug/dL Final    Transferrin 04/22/2024 265  200 - 375 mg/dL Final    TIBC 04/22/2024 371  250 - 450 ug/dL Final    Saturated Iron 04/22/2024 11 (L)  20 - 50 % Final    Ferritin 04/22/2024 11.0 (L)  20.0 - 300.0 ng/mL Final    Specimen UA 04/22/2024 Urine, Clean Catch   Final    Color, UA 04/22/2024 Yellow  Yellow, Straw, [place] Final    Appearance, UA 04/22/2024 Hazy (A)  Clear Final    pH, UA 04/22/2024 6.0  5.0 - 8.0 Final    Specific Gravity, UA 04/22/2024 1.025  1.005 - 1.030 Final    Protein, UA 04/22/2024 Trace (A)  Negative Final    Comment: Recommend a 24 hour urine protein or a urine   protein/creatinine ratio if globulin induced proteinuria is  clinically suspected.      Glucose, UA 04/22/2024 Negative  Negative Final    Ketones, UA 04/22/2024 Negative  Negative Final    Bilirubin (UA) 04/22/2024 Negative  Negative Final    Occult Blood UA 04/22/2024 Negative  Negative Final    Nitrite, UA 04/22/2024 Positive (A)  Negative Final    Urobilinogen, UA 04/22/2024 2.0-3.0 (A)  Negative EU/dL Final    Leukocytes, UA 04/22/2024 2+ (A)  Negative Final    TSH 04/22/2024 1.202  0.340 - 5.600 uIU/mL Final    Cholesterol 04/22/2024 167  120 - 199 mg/dL Final    Comment: The National Cholesterol Education Program (NCEP) has set the  following guidelines (reference ranges) for Cholesterol:  Optimal.....................<200  mg/dL  Borderline High.............200-239 mg/dL  High........................> or = 240 mg/dL      Triglycerides 04/22/2024 107  30 - 150 mg/dL Final    Comment: The National Cholesterol Education Program (NCEP) has set the  following guidelines (reference values) for triglycerides:  Normal......................<150 mg/dL  Borderline High.............150-199 mg/dL  High........................200-499 mg/dL      HDL 04/22/2024 44  40 - 75 mg/dL Final    Comment: The National Cholesterol Education Program (NCEP) has set the  following guidelines (reference values) for HDL Cholesterol:  Low...............<40 mg/dL  Optimal...........>60 mg/dL      LDL Cholesterol 04/22/2024 101.6  63.0 - 159.0 mg/dL Final    Comment: The National Cholesterol Education Program (NCEP) has set the  following guidelines (reference values) for LDL Cholesterol:  Optimal.......................<130 mg/dL  Borderline High...............130-159 mg/dL  High..........................160-189 mg/dL  Very High.....................>190 mg/dL      HDL/Cholesterol Ratio 04/22/2024 26.3  20.0 - 50.0 % Final    Total Cholesterol/HDL Ratio 04/22/2024 3.8  2.0 - 5.0 Final    Non-HDL Cholesterol 04/22/2024 123  mg/dL Final    Comment: Risk category and Non-HDL cholesterol goals:  Coronary heart disease (CHD)or equivalent (10-year risk of CHD >20%):  Non-HDL cholesterol goal     <130 mg/dL  Two or more CHD risk factors and 10-year risk of CHD <= 20%:  Non-HDL cholesterol goal     <160 mg/dL  0 to 1 CHD risk factor:  Non-HDL cholesterol goal     <190 mg/dL      Sodium 04/22/2024 137  136 - 145 mmol/L Final    Potassium 04/22/2024 4.4  3.5 - 5.1 mmol/L Final    Chloride 04/22/2024 108  95 - 110 mmol/L Final    CO2 04/22/2024 23  23 - 29 mmol/L Final    Glucose 04/22/2024 96  70 - 110 mg/dL Final    BUN 04/22/2024 15  8 - 23 mg/dL Final    Creatinine 04/22/2024 1.1  0.5 - 1.4 mg/dL Final    Calcium 04/22/2024 9.3  8.7 - 10.5 mg/dL Final    Total Protein  04/22/2024 6.8  6.0 - 8.4 g/dL Final    Albumin 04/22/2024 4.1  3.5 - 5.2 g/dL Final    Total Bilirubin 04/22/2024 0.6  0.1 - 1.0 mg/dL Final    Comment: For infants and newborns, interpretation of results should be based  on gestational age, weight and in agreement with clinical  observations.    Premature Infant recommended reference ranges:  Up to 24 hours.............<8.0 mg/dL  Up to 48 hours............<12.0 mg/dL  3-5 days..................<15.0 mg/dL  6-29 days.................<15.0 mg/dL      Alkaline Phosphatase 04/22/2024 66  55 - 135 U/L Final    AST 04/22/2024 12  10 - 40 U/L Final    ALT 04/22/2024 5 (L)  10 - 44 U/L Final    eGFR 04/22/2024 49.2 (A)  >60 mL/min/1.73 m^2 Final    Anion Gap 04/22/2024 6 (L)  8 - 16 mmol/L Final    WBC 04/22/2024 5.02  3.90 - 12.70 K/uL Final    RBC 04/22/2024 4.66  4.00 - 5.40 M/uL Final    Hemoglobin 04/22/2024 12.1  12.0 - 16.0 g/dL Final    Hematocrit 04/22/2024 40.2  37.0 - 48.5 % Final    MCV 04/22/2024 86  82 - 98 fL Final    MCH 04/22/2024 26.0 (L)  27.0 - 31.0 pg Final    MCHC 04/22/2024 30.1 (L)  32.0 - 36.0 g/dL Final    RDW 04/22/2024 17.5 (H)  11.5 - 14.5 % Final    Platelets 04/22/2024 193  150 - 450 K/uL Final    MPV 04/22/2024 11.7  9.2 - 12.9 fL Final    Immature Granulocytes 04/22/2024 0.2  0.0 - 0.5 % Final    Gran # (ANC) 04/22/2024 2.4  1.8 - 7.7 K/uL Final    Immature Grans (Abs) 04/22/2024 0.01  0.00 - 0.04 K/uL Final    Comment: Mild elevation in immature granulocytes is non specific and   can be seen in a variety of conditions including stress response,   acute inflammation, trauma and pregnancy. Correlation with other   laboratory and clinical findings is essential.      Lymph # 04/22/2024 2.1  1.0 - 4.8 K/uL Final    Mono # 04/22/2024 0.4  0.3 - 1.0 K/uL Final    Eos # 04/22/2024 0.1  0.0 - 0.5 K/uL Final    Baso # 04/22/2024 0.01  0.00 - 0.20 K/uL Final    nRBC 04/22/2024 0  0 /100 WBC Final    Gran % 04/22/2024 48.0  38.0 - 73.0 % Final     Lymph % 04/22/2024 42.0  18.0 - 48.0 % Final    Mono % 04/22/2024 7.2  4.0 - 15.0 % Final    Eosinophil % 04/22/2024 2.4  0.0 - 8.0 % Final    Basophil % 04/22/2024 0.2  0.0 - 1.9 % Final    Differential Method 04/22/2024 Automated   Final    Microalbumin, Urine 04/22/2024 28.4 (H)  <19.9 ug/mL Final    Creatinine, Urine 04/22/2024 236.9  15.0 - 325.0 mg/dL Final    Comment: The random urine reference ranges provided were established   for 24 hour urine collections.  No reference ranges exist for  random urine specimens.  Correlate clinically.      Microalb/Creat Ratio 04/22/2024 12.0  0.0 - 30.0 ug/mg Final    RBC, UA 04/22/2024 1  0 - 4 /hpf Final    WBC, UA 04/22/2024 53 (H)  0 - 5 /hpf Final    Bacteria 04/22/2024 Rare  None-Occ /hpf Final    Yeast, UA 04/22/2024 Rare (A)  None Final    Squam Epithel, UA 04/22/2024 1  /hpf Final    Microscopic Comment 04/22/2024 SEE COMMENT   Final    Comment: Other formed elements not mentioned in the report are not   present in the microscopic examination.           Assessment:       1. Essential hypertension    2. Chronic right hip pain    3. Primary osteoarthritis involving multiple joints    4. Anxiety    5. Mixed hyperlipidemia    6. Iron deficiency anemia secondary to inadequate dietary iron intake    7. Stage 3a chronic kidney disease        Plan:       Assessment & Plan    Assessed anxiety management, noting stable control with current Xanax regimen  Reviewed rheumatologist's findings from September visit, including steroid injections for shoulder pain and suspicion of muscular rather than hip-related discomfort  Evaluated weight loss trend, noting 7-pound decrease over 6 months, with BMI still within normal range at 19.65  Considered potential age-related factors affecting appetite and weight  Assessed blood pressure, finding it within acceptable range    WEIGHT MANAGEMENT:  - Explained importance of maintaining weight.  - Discussed options for high-calorie nutritional  drinks (e.g., Boost, Ensure) to help maintain weight, mentioning availability of 250-500 calorie options.  - Ms. Alonso to avoid skipping lunch.  - Recommend snacking on foods throughout the day.  - Ms. Alonso to incorporate protein shakes or high-calorie nutritional drinks into diet.    RHEUMATOID ARTHRITIS:  - Refilled Voltaren gel.  - Encouraged patient to get labs completed ordered by Rheumatology.    HYPERTENSION:  - Continued Benazepril at current dose.  - Continued Amlodipine at current dose.  -Reduce the amount of salt in your diet; Lose weight; Avoid drinking too much alcohol; Exercise at least 30 minutes per day most days of the week.  Continue current medications and home BP monitoring.    MEDICATIONS/SUPPLEMENTS:  - Continued iron supplements.    LABS:  - Ordered routine labs.  - Directed lab orders to be completed at [facility] or [facility] imaging center.  - Ms. Alonso to complete labs prior to next visit.    FOLLOW UP:  - Follow up in 3 months.  - Will message lab results through patient portal.        Essential hypertension  -     Comprehensive Metabolic Panel; Future; Expected date: 10/23/2024  -     Lipid Panel; Future; Expected date: 10/23/2024  -     TSH; Future; Expected date: 10/23/2024    Chronic right hip pain  Recommend starting PT as ordered by Rheumatology.  Avoid oral NSAIDs due to CKD.  Diclofenac gel refilled  -     diclofenac sodium (VOLTAREN) 1 % Gel; Apply topically 2 (two) times daily.  Dispense: 450 g; Refill: 3  -     CBC Auto Differential; Future; Expected date: 10/23/2024    Primary osteoarthritis involving multiple joints  -     diclofenac sodium (VOLTAREN) 1 % Gel; Apply topically 2 (two) times daily.  Dispense: 450 g; Refill: 3    Anxiety  Stable, continue Xanax,  reviewed, follow-up in three months.  -     TSH; Future; Expected date: 10/23/2024    Mixed hyperlipidemia  Limit red meat, butter, fried foods, cheese, and other foods that have a lot of saturated fat. Consume more: lean  meats, fish, fruits, vegetables, whole grains, beans, lentils, and nuts.  Weight loss, and 30-45 min of cardiovascular exercise daily.  -     Comprehensive Metabolic Panel; Future; Expected date: 10/23/2024  -     Lipid Panel; Future; Expected date: 10/23/2024  -     TSH; Future; Expected date: 10/23/2024    Iron deficiency anemia secondary to inadequate dietary iron intake  Continue iron supplements.  -     CBC Auto Differential; Future; Expected date: 10/23/2024  -     Comprehensive Metabolic Panel; Future; Expected date: 10/23/2024  -     Ferritin; Future; Expected date: 10/23/2024  -     Iron and TIBC; Future; Expected date: 10/23/2024    Stage 3a chronic kidney disease  May need referral to nephrology, labs pending. Avoid oral NSAIDs, increase fluids, strict BP control.   -     CBC Auto Differential; Future; Expected date: 10/23/2024  -     Comprehensive Metabolic Panel; Future; Expected date: 10/23/2024  -     TSH; Future; Expected date: 10/23/2024  -     Ferritin; Future; Expected date: 10/23/2024  -     Iron and TIBC; Future; Expected date: 10/23/2024    I spent a total of 15 minutes on the day of the visit.This includes face to face time and non-face to face time preparing to see the patient (eg, review of tests), obtaining and/or reviewing separately obtained history, documenting clinical information in the electronic or other health record, independently interpreting results and communicating results to the patient/family/caregiver, or care coordinator.    Follow up in about 3 months (around 1/23/2025) for Anxiety.          10/23/2024 ELINA Johnston, MARIE    This note was generated with the assistance of ambient listening technology. Verbal consent was obtained by the patient and accompanying visitor(s) for the recording of patient appointment to facilitate this note. I attest to having reviewed and edited the generated note for accuracy, though some syntax or spelling errors may persist. Please  contact the author of this note for any clarification.

## 2024-11-15 ENCOUNTER — TELEPHONE (OUTPATIENT)
Dept: FAMILY MEDICINE | Facility: CLINIC | Age: 84
End: 2024-11-15
Payer: MEDICARE

## 2024-11-15 DIAGNOSIS — N39.0 URINARY TRACT INFECTION WITHOUT HEMATURIA, SITE UNSPECIFIED: Primary | ICD-10-CM

## 2024-11-19 ENCOUNTER — OFFICE VISIT (OUTPATIENT)
Dept: FAMILY MEDICINE | Facility: CLINIC | Age: 84
End: 2024-11-19
Payer: MEDICARE

## 2024-11-19 ENCOUNTER — LAB VISIT (OUTPATIENT)
Dept: LAB | Facility: HOSPITAL | Age: 84
End: 2024-11-19
Attending: NURSE PRACTITIONER
Payer: MEDICARE

## 2024-11-19 VITALS
OXYGEN SATURATION: 96 % | BODY MASS INDEX: 19.96 KG/M2 | SYSTOLIC BLOOD PRESSURE: 128 MMHG | DIASTOLIC BLOOD PRESSURE: 78 MMHG | HEART RATE: 80 BPM | TEMPERATURE: 98 F | WEIGHT: 112.63 LBS | HEIGHT: 63 IN

## 2024-11-19 DIAGNOSIS — R35.0 URINARY FREQUENCY: ICD-10-CM

## 2024-11-19 DIAGNOSIS — I10 ESSENTIAL HYPERTENSION: ICD-10-CM

## 2024-11-19 DIAGNOSIS — R30.0 DYSURIA: Primary | ICD-10-CM

## 2024-11-19 DIAGNOSIS — N39.0 URINARY TRACT INFECTION WITHOUT HEMATURIA, SITE UNSPECIFIED: ICD-10-CM

## 2024-11-19 LAB
BILIRUB UR QL STRIP: NEGATIVE
GLUCOSE UR QL STRIP: NEGATIVE
KETONES UR QL STRIP: NEGATIVE
LEUKOCYTE ESTERASE UR QL STRIP: POSITIVE
PH, POC UA: 5 (ref 5–8)
POC BLOOD, URINE: NEGATIVE
POC NITRATES, URINE: NEGATIVE
PROT UR QL STRIP: NEGATIVE
SP GR UR STRIP: 1.02 (ref 1–1.03)
UROBILINOGEN UR STRIP-ACNC: 1 (ref 0.1–1.1)

## 2024-11-19 PROCEDURE — 99999 PR PBB SHADOW E&M-EST. PATIENT-LVL IV: CPT | Mod: PBBFAC,,, | Performed by: NURSE PRACTITIONER

## 2024-11-19 PROCEDURE — 99214 OFFICE O/P EST MOD 30 MIN: CPT | Mod: S$GLB,,, | Performed by: NURSE PRACTITIONER

## 2024-11-19 PROCEDURE — 1160F RVW MEDS BY RX/DR IN RCRD: CPT | Mod: CPTII,S$GLB,, | Performed by: NURSE PRACTITIONER

## 2024-11-19 PROCEDURE — 87086 URINE CULTURE/COLONY COUNT: CPT | Performed by: NURSE PRACTITIONER

## 2024-11-19 PROCEDURE — 3288F FALL RISK ASSESSMENT DOCD: CPT | Mod: CPTII,S$GLB,, | Performed by: NURSE PRACTITIONER

## 2024-11-19 PROCEDURE — 81003 URINALYSIS AUTO W/O SCOPE: CPT | Mod: QW,S$GLB,, | Performed by: NURSE PRACTITIONER

## 2024-11-19 PROCEDURE — 1125F AMNT PAIN NOTED PAIN PRSNT: CPT | Mod: CPTII,S$GLB,, | Performed by: NURSE PRACTITIONER

## 2024-11-19 PROCEDURE — 1159F MED LIST DOCD IN RCRD: CPT | Mod: CPTII,S$GLB,, | Performed by: NURSE PRACTITIONER

## 2024-11-19 PROCEDURE — 1101F PT FALLS ASSESS-DOCD LE1/YR: CPT | Mod: CPTII,S$GLB,, | Performed by: NURSE PRACTITIONER

## 2024-11-19 PROCEDURE — G2211 COMPLEX E/M VISIT ADD ON: HCPCS | Mod: 95,S$GLB,, | Performed by: NURSE PRACTITIONER

## 2024-11-19 RX ORDER — CIPROFLOXACIN 250 MG/1
250 TABLET, FILM COATED ORAL 2 TIMES DAILY
Qty: 6 TABLET | Refills: 0 | Status: SHIPPED | OUTPATIENT
Start: 2024-11-19 | End: 2024-11-22

## 2024-11-19 NOTE — PROGRESS NOTES
SUBJECTIVE:      Patient ID: Martha Alonso is a 84 y.o. female.    Chief Complaint: Urinary Urgency (Onset last week Tuesday ), Urinary Frequency, and Pelvic Pain    History of Present Illness    CHIEF COMPLAINT:  Ms. Alonso presents with concerns about recent high blood pressure readings and urinary symptoms including dysuria and urgency.    HPI:  Ms. Alonso had significantly elevated blood pressure for approximately 3 days last week, with one reading as high as 180/102. Prior readings were 160/93, 178/96, 147/90, 143/86, 130/87. The blood pressure has since returned to normal levels, with a recent reading of 127/81. Ms. Alonso is uncertain of the cause of this blood pressure spike. Initial blood pressure reading today is 150/73. Her daughter is concerned about medication compliance and has recently been filling her weekly pill dispenser.    Concurrently, the patient has had urinary symptoms for about a week, starting around last Tuesday. These symptoms include dysuria, frequency, urgency, and pressure in the bladder area. Ms. Alonso reports feeling the need to urinate immediately after voiding. Ms. Alonso did not take available Azo due to concerns about it affecting test results.    Ms. Alonso might have white coat syndrome, potentially affecting the blood pressure readings.    MEDICATIONS:  Ms. Alonso is on some blood pressure medication.    MEDICAL HISTORY:  Ms. Alonso has a history of hypertension.    TEST RESULTS:  Ms. Alonso's prior GFR test result was in the 40s. A recent urinalysis showed the presence of leukocytes.        Review of Systems   Constitutional:  Negative for activity change, appetite change, chills, diaphoresis, fatigue, fever and unexpected weight change.   HENT:  Negative for congestion, ear pain, sinus pressure, sore throat, trouble swallowing and voice change.    Eyes:  Negative for pain, discharge and visual disturbance.   Respiratory:  Negative for cough, chest tightness, shortness  of breath and wheezing.    Cardiovascular:  Negative for chest pain and palpitations.   Gastrointestinal:  Negative for abdominal pain, constipation, diarrhea, nausea and vomiting.   Genitourinary:  Positive for dysuria, frequency, pelvic pain and urgency. Negative for difficulty urinating, flank pain, hematuria, vaginal bleeding, vaginal discharge and vaginal pain.   Musculoskeletal:  Negative for back pain and joint swelling.   Skin:  Negative for color change and rash.   Neurological:  Negative for dizziness, seizures, syncope, weakness, numbness and headaches.   Hematological:  Negative for adenopathy.   Psychiatric/Behavioral:  Negative for dysphoric mood and sleep disturbance. The patient is not nervous/anxious.        No family history on file.   Social History     Socioeconomic History    Marital status:    Occupational History    Occupation: retired   Tobacco Use    Smoking status: Never    Smokeless tobacco: Never   Substance and Sexual Activity    Alcohol use: Not Currently    Drug use: Never    Sexual activity: Not Currently     Current Outpatient Medications   Medication Sig Dispense Refill    ALPRAZolam (XANAX) 0.25 MG tablet Take 1 tablet (0.25 mg total) by mouth 2 (two) times daily as needed for Anxiety. 60 tablet 0    amLODIPine (NORVASC) 10 MG tablet Take 1 tablet (10 mg total) by mouth once daily. 90 tablet 3    benazepriL (LOTENSIN) 20 MG tablet Take 1 tablet (20 mg total) by mouth once daily. 90 tablet 0    diclofenac sodium (VOLTAREN) 1 % Gel Apply topically 2 (two) times daily. 450 g 3    famotidine (PEPCID) 20 MG tablet Take 1 tablet (20 mg total) by mouth 2 (two) times daily. 180 tablet 1    ferrous gluconate (FERGON) 324 MG tablet Take 1 tablet (324 mg total) by mouth daily with breakfast. 90 tablet 1    ondansetron (ZOFRAN-ODT) 4 MG TbDL Take 1 tablet (4 mg total) by mouth every 12 (twelve) hours as needed (nausea). 21 tablet 0    ciprofloxacin HCl (CIPRO) 250 MG tablet Take 1  "tablet (250 mg total) by mouth 2 (two) times daily. for 3 days 6 tablet 0     No current facility-administered medications for this visit.     Review of patient's allergies indicates:  No Known Allergies   Past Medical History:   Diagnosis Date    Hypertension     Rheumatoid arthritis      History reviewed. No pertinent surgical history.       OBJECTIVE:      Vitals:    11/19/24 1607 11/19/24 1630   BP: (!) 150/73 128/78   BP Location: Left arm Left arm   Patient Position: Sitting Sitting   Pulse: 78 80   Temp: 97.5 °F (36.4 °C)    TempSrc: Oral    SpO2: 96%    Weight: 51.1 kg (112 lb 9.6 oz)    Height: 5' 3" (1.6 m)      Physical Exam  Vitals and nursing note reviewed.   Constitutional:       General: She is awake. She is not in acute distress.     Appearance: Normal appearance. She is well-developed, well-groomed and normal weight. She is not ill-appearing, toxic-appearing or diaphoretic.   HENT:      Head: Normocephalic and atraumatic.      Nose: Nose normal.   Eyes:      General: Lids are normal. Gaze aligned appropriately.      Conjunctiva/sclera: Conjunctivae normal.      Right eye: Right conjunctiva is not injected.      Left eye: Left conjunctiva is not injected.      Pupils: Pupils are equal, round, and reactive to light.   Cardiovascular:      Rate and Rhythm: Normal rate and regular rhythm.      Pulses: Normal pulses.      Heart sounds: Normal heart sounds, S1 normal and S2 normal. No murmur heard.     No friction rub. No gallop.   Pulmonary:      Effort: Pulmonary effort is normal. No respiratory distress.      Breath sounds: Normal breath sounds. No stridor. No decreased breath sounds, wheezing, rhonchi or rales.   Chest:      Chest wall: No tenderness.   Abdominal:      General: There is no distension.      Palpations: Abdomen is soft.      Tenderness: There is abdominal tenderness in the suprapubic area. There is no right CVA tenderness or left CVA tenderness.   Musculoskeletal:      Cervical back: " Neck supple.      Right lower leg: No edema.      Left lower leg: No edema.   Lymphadenopathy:      Cervical: No cervical adenopathy.   Skin:     General: Skin is warm and dry.      Capillary Refill: Capillary refill takes less than 2 seconds.      Findings: No erythema or rash.   Neurological:      Mental Status: She is alert and oriented to person, place, and time. Mental status is at baseline.   Psychiatric:         Attention and Perception: Attention normal.         Mood and Affect: Mood normal.         Speech: Speech normal.         Behavior: Behavior normal. Behavior is cooperative.         Thought Content: Thought content normal.         Judgment: Judgment normal.       Office Visit on 11/19/2024   Component Date Value Ref Range Status    POC Blood, Urine 11/19/2024 Negative  Negative Final    POC Bilirubin, Urine 11/19/2024 Negative  Negative Final    POC Urobilinogen, Urine 11/19/2024 1.0  0.1 - 1.1 Final    POC Ketones, Urine 11/19/2024 Negative  Negative Final    POC Protein, Urine 11/19/2024 Negative  Negative Final    POC Nitrates, Urine 11/19/2024 Negative  Negative Final    POC Glucose, Urine 11/19/2024 Negative  Negative Final    pH, UA 11/19/2024 5.0  5.0 - 8.0 Final    POC Specific Gravity, Urine 11/19/2024 1.025  1.003 - 1.029 Final    POC Leukocytes, Urine 11/19/2024 Positive (A)  Negative Final          Assessment:       1. Dysuria    2. Urinary frequency    3. Essential hypertension        Plan:       Assessment & Plan    Considered UTI as cause of elevated blood pressure and urinary symptoms  Noted leukocytes in urine, suggesting possible UTI  Will start empiric antibiotic treatment while awaiting culture results  Chose low-dose Cipro due to patient's GFR in the 40s  Assessed for kidney involvement, found no pain on palpation  Planned to reassess blood pressure after UTI treatment    URINARY TRACT INFECTION:  - Started Ciprofloxacin 250 mg twice daily for 3 days.  - Ms. Vergel to increase  fluid intake.  - Urine culture ordered.  - Can use OTC Azo for burning sensation if needed.    ELEVATED BLOOD PRESSURE:  - Blood pressure may be elevated due to current illness.  - Nurse to recheck blood pressure.  - Continued benazepril 20 mg and amlodipine 10 mg.   - Reduce the amount of salt in your diet.  - Continue home blood pressure monitoring.     FOLLOW-UP:  - Keep routine follow-up in January.   - Check patient portal for updates.        Dysuria  -     POCT Urinalysis, Dipstick, Automated, W/O Scope  -     ciprofloxacin HCl (CIPRO) 250 MG tablet; Take 1 tablet (250 mg total) by mouth 2 (two) times daily. for 3 days  Dispense: 6 tablet; Refill: 0    Urinary frequency  -     ciprofloxacin HCl (CIPRO) 250 MG tablet; Take 1 tablet (250 mg total) by mouth 2 (two) times daily. for 3 days  Dispense: 6 tablet; Refill: 0    Essential hypertension    I spent a total of 15 minutes on the day of the visit.This includes face to face time and non-face to face time preparing to see the patient (eg, review of tests), obtaining and/or reviewing separately obtained history, documenting clinical information in the electronic or other health record, independently interpreting results and communicating results to the patient/family/caregiver, or care coordinator.    Follow up if symptoms worsen or fail to improve, for follow-up scheduled in January.          11/19/2024 ELINA Johnston, MARIE    This note was generated with the assistance of ambient listening technology. Verbal consent was obtained by the patient and accompanying visitor(s) for the recording of patient appointment to facilitate this note. I attest to having reviewed and edited the generated note for accuracy, though some syntax or spelling errors may persist. Please contact the author of this note for any clarification.

## 2024-11-20 LAB
BACTERIA UR CULT: NORMAL
BACTERIA UR CULT: NORMAL

## 2024-12-03 DIAGNOSIS — F41.9 ANXIETY: Chronic | ICD-10-CM

## 2024-12-03 RX ORDER — ALPRAZOLAM 0.25 MG/1
0.25 TABLET ORAL 2 TIMES DAILY PRN
Qty: 60 TABLET | Refills: 0 | Status: SHIPPED | OUTPATIENT
Start: 2024-12-03

## 2025-01-16 ENCOUNTER — PATIENT MESSAGE (OUTPATIENT)
Dept: FAMILY MEDICINE | Facility: CLINIC | Age: 85
End: 2025-01-16
Payer: MEDICARE

## 2025-01-16 DIAGNOSIS — I10 ESSENTIAL HYPERTENSION: ICD-10-CM

## 2025-01-16 RX ORDER — BENAZEPRIL HYDROCHLORIDE 20 MG/1
20 TABLET ORAL DAILY
Qty: 90 TABLET | Refills: 3 | Status: SHIPPED | OUTPATIENT
Start: 2025-01-16

## 2025-01-17 DIAGNOSIS — F41.9 ANXIETY: Chronic | ICD-10-CM

## 2025-01-18 DIAGNOSIS — K21.9 GASTROESOPHAGEAL REFLUX DISEASE, UNSPECIFIED WHETHER ESOPHAGITIS PRESENT: ICD-10-CM

## 2025-01-21 ENCOUNTER — TELEPHONE (OUTPATIENT)
Dept: FAMILY MEDICINE | Facility: CLINIC | Age: 85
End: 2025-01-21
Payer: MEDICARE

## 2025-01-21 ENCOUNTER — PATIENT MESSAGE (OUTPATIENT)
Dept: FAMILY MEDICINE | Facility: CLINIC | Age: 85
End: 2025-01-21
Payer: MEDICARE

## 2025-01-21 RX ORDER — ALPRAZOLAM 0.25 MG/1
0.25 TABLET ORAL 2 TIMES DAILY PRN
Qty: 60 TABLET | Refills: 0 | Status: SHIPPED | OUTPATIENT
Start: 2025-01-21

## 2025-01-23 RX ORDER — FAMOTIDINE 20 MG/1
20 TABLET, FILM COATED ORAL 2 TIMES DAILY
Qty: 180 TABLET | Refills: 1 | Status: SHIPPED | OUTPATIENT
Start: 2025-01-23

## 2025-01-28 ENCOUNTER — LAB VISIT (OUTPATIENT)
Dept: LAB | Facility: HOSPITAL | Age: 85
End: 2025-01-28
Attending: NURSE PRACTITIONER
Payer: MEDICARE

## 2025-01-28 DIAGNOSIS — F41.9 ANXIETY: ICD-10-CM

## 2025-01-28 DIAGNOSIS — M25.50 POLYARTHRALGIA: ICD-10-CM

## 2025-01-28 DIAGNOSIS — E78.2 MIXED HYPERLIPIDEMIA: ICD-10-CM

## 2025-01-28 DIAGNOSIS — N18.31 STAGE 3A CHRONIC KIDNEY DISEASE: ICD-10-CM

## 2025-01-28 DIAGNOSIS — G89.29 CHRONIC RIGHT HIP PAIN: ICD-10-CM

## 2025-01-28 DIAGNOSIS — M25.551 CHRONIC RIGHT HIP PAIN: ICD-10-CM

## 2025-01-28 DIAGNOSIS — I10 ESSENTIAL HYPERTENSION: ICD-10-CM

## 2025-01-28 DIAGNOSIS — D50.8 IRON DEFICIENCY ANEMIA SECONDARY TO INADEQUATE DIETARY IRON INTAKE: ICD-10-CM

## 2025-01-28 LAB
ALBUMIN SERPL BCP-MCNC: 4.2 G/DL (ref 3.5–5.2)
ALP SERPL-CCNC: 57 U/L (ref 55–135)
ALT SERPL W/O P-5'-P-CCNC: 5 U/L (ref 10–44)
ANION GAP SERPL CALC-SCNC: 8 MMOL/L (ref 8–16)
AST SERPL-CCNC: 13 U/L (ref 10–40)
BASOPHILS # BLD AUTO: 0.04 K/UL (ref 0–0.2)
BASOPHILS NFR BLD: 0.7 % (ref 0–1.9)
BILIRUB SERPL-MCNC: 0.7 MG/DL (ref 0.1–1)
BUN SERPL-MCNC: 21 MG/DL (ref 8–23)
CALCIUM SERPL-MCNC: 9.6 MG/DL (ref 8.7–10.5)
CHLORIDE SERPL-SCNC: 106 MMOL/L (ref 95–110)
CHOLEST SERPL-MCNC: 186 MG/DL (ref 120–199)
CHOLEST/HDLC SERPL: 3.3 {RATIO} (ref 2–5)
CO2 SERPL-SCNC: 24 MMOL/L (ref 23–29)
CREAT SERPL-MCNC: 0.9 MG/DL (ref 0.5–1.4)
DIFFERENTIAL METHOD BLD: ABNORMAL
EOSINOPHIL # BLD AUTO: 0.2 K/UL (ref 0–0.5)
EOSINOPHIL NFR BLD: 2.6 % (ref 0–8)
ERYTHROCYTE [DISTWIDTH] IN BLOOD BY AUTOMATED COUNT: 14.9 % (ref 11.5–14.5)
EST. GFR  (NO RACE VARIABLE): >60 ML/MIN/1.73 M^2
FERRITIN SERPL-MCNC: 15.1 NG/ML (ref 20–300)
GLUCOSE SERPL-MCNC: 82 MG/DL (ref 70–110)
HCT VFR BLD AUTO: 39 % (ref 37–48.5)
HDLC SERPL-MCNC: 56 MG/DL (ref 40–75)
HDLC SERPL: 30.1 % (ref 20–50)
HGB BLD-MCNC: 12.2 G/DL (ref 12–16)
IMM GRANULOCYTES # BLD AUTO: 0.01 K/UL (ref 0–0.04)
IMM GRANULOCYTES NFR BLD AUTO: 0.2 % (ref 0–0.5)
IRON SERPL-MCNC: 58 UG/DL (ref 30–160)
LDLC SERPL CALC-MCNC: 110 MG/DL (ref 63–159)
LYMPHOCYTES # BLD AUTO: 2.5 K/UL (ref 1–4.8)
LYMPHOCYTES NFR BLD: 40.5 % (ref 18–48)
MCH RBC QN AUTO: 27.9 PG (ref 27–31)
MCHC RBC AUTO-ENTMCNC: 31.3 G/DL (ref 32–36)
MCV RBC AUTO: 89 FL (ref 82–98)
MONOCYTES # BLD AUTO: 0.4 K/UL (ref 0.3–1)
MONOCYTES NFR BLD: 6.6 % (ref 4–15)
NEUTROPHILS # BLD AUTO: 3 K/UL (ref 1.8–7.7)
NEUTROPHILS NFR BLD: 49.4 % (ref 38–73)
NONHDLC SERPL-MCNC: 130 MG/DL
NRBC BLD-RTO: 0 /100 WBC
PLATELET # BLD AUTO: 172 K/UL (ref 150–450)
PMV BLD AUTO: 10.8 FL (ref 9.2–12.9)
POTASSIUM SERPL-SCNC: 4.2 MMOL/L (ref 3.5–5.1)
PROT SERPL-MCNC: 6.8 G/DL (ref 6–8.4)
RBC # BLD AUTO: 4.38 M/UL (ref 4–5.4)
RHEUMATOID FACT SERPL-ACNC: <10 IU/ML (ref 0–15)
SATURATED IRON: 15 % (ref 20–50)
SODIUM SERPL-SCNC: 138 MMOL/L (ref 136–145)
TOTAL IRON BINDING CAPACITY: 398 UG/DL (ref 250–450)
TRANSFERRIN SERPL-MCNC: 284 MG/DL (ref 200–375)
TRIGL SERPL-MCNC: 100 MG/DL (ref 30–150)
TSH SERPL DL<=0.005 MIU/L-ACNC: 1.37 UIU/ML (ref 0.34–5.6)
WBC # BLD AUTO: 6.08 K/UL (ref 3.9–12.7)

## 2025-01-28 PROCEDURE — 86038 ANTINUCLEAR ANTIBODIES: CPT | Performed by: STUDENT IN AN ORGANIZED HEALTH CARE EDUCATION/TRAINING PROGRAM

## 2025-01-28 PROCEDURE — 84466 ASSAY OF TRANSFERRIN: CPT | Performed by: NURSE PRACTITIONER

## 2025-01-28 PROCEDURE — 80053 COMPREHEN METABOLIC PANEL: CPT | Performed by: NURSE PRACTITIONER

## 2025-01-28 PROCEDURE — 85025 COMPLETE CBC W/AUTO DIFF WBC: CPT | Performed by: NURSE PRACTITIONER

## 2025-01-28 PROCEDURE — 84443 ASSAY THYROID STIM HORMONE: CPT | Performed by: NURSE PRACTITIONER

## 2025-01-28 PROCEDURE — 86431 RHEUMATOID FACTOR QUANT: CPT | Performed by: STUDENT IN AN ORGANIZED HEALTH CARE EDUCATION/TRAINING PROGRAM

## 2025-01-28 PROCEDURE — 86200 CCP ANTIBODY: CPT | Performed by: STUDENT IN AN ORGANIZED HEALTH CARE EDUCATION/TRAINING PROGRAM

## 2025-01-28 PROCEDURE — 82728 ASSAY OF FERRITIN: CPT | Performed by: NURSE PRACTITIONER

## 2025-01-28 PROCEDURE — 80061 LIPID PANEL: CPT | Performed by: NURSE PRACTITIONER

## 2025-01-28 PROCEDURE — 36415 COLL VENOUS BLD VENIPUNCTURE: CPT | Performed by: STUDENT IN AN ORGANIZED HEALTH CARE EDUCATION/TRAINING PROGRAM

## 2025-01-29 ENCOUNTER — OFFICE VISIT (OUTPATIENT)
Dept: RHEUMATOLOGY | Facility: CLINIC | Age: 85
End: 2025-01-29
Payer: MEDICARE

## 2025-01-29 VITALS
SYSTOLIC BLOOD PRESSURE: 146 MMHG | HEART RATE: 120 BPM | BODY MASS INDEX: 20.12 KG/M2 | DIASTOLIC BLOOD PRESSURE: 77 MMHG | HEIGHT: 63 IN | WEIGHT: 113.56 LBS

## 2025-01-29 DIAGNOSIS — M25.50 POLYARTHRALGIA: ICD-10-CM

## 2025-01-29 DIAGNOSIS — M79.18 MYALGIA, OTHER SITE: ICD-10-CM

## 2025-01-29 DIAGNOSIS — D50.8 IRON DEFICIENCY ANEMIA SECONDARY TO INADEQUATE DIETARY IRON INTAKE: Primary | ICD-10-CM

## 2025-01-29 DIAGNOSIS — M67.951 TENDINOPATHY OF RIGHT GLUTEUS MEDIUS: ICD-10-CM

## 2025-01-29 PROCEDURE — 1125F AMNT PAIN NOTED PAIN PRSNT: CPT | Mod: CPTII,S$GLB,, | Performed by: STUDENT IN AN ORGANIZED HEALTH CARE EDUCATION/TRAINING PROGRAM

## 2025-01-29 PROCEDURE — 1159F MED LIST DOCD IN RCRD: CPT | Mod: CPTII,S$GLB,, | Performed by: STUDENT IN AN ORGANIZED HEALTH CARE EDUCATION/TRAINING PROGRAM

## 2025-01-29 PROCEDURE — 99214 OFFICE O/P EST MOD 30 MIN: CPT | Mod: S$GLB,,, | Performed by: STUDENT IN AN ORGANIZED HEALTH CARE EDUCATION/TRAINING PROGRAM

## 2025-01-29 PROCEDURE — 99999 PR PBB SHADOW E&M-EST. PATIENT-LVL III: CPT | Mod: PBBFAC,,, | Performed by: STUDENT IN AN ORGANIZED HEALTH CARE EDUCATION/TRAINING PROGRAM

## 2025-01-29 RX ORDER — FERROUS GLUCONATE 324(38)MG
324 TABLET ORAL
Qty: 90 TABLET | Refills: 1 | COMMUNITY
Start: 2025-01-29

## 2025-01-30 LAB
ANA TITR SER IF: NEGATIVE {TITER}
CCP IGA+IGG SERPL IA-ACNC: 3 UNITS (ref 0–19)

## 2025-02-11 ENCOUNTER — OFFICE VISIT (OUTPATIENT)
Dept: FAMILY MEDICINE | Facility: CLINIC | Age: 85
End: 2025-02-11
Payer: MEDICARE

## 2025-02-11 VITALS
DIASTOLIC BLOOD PRESSURE: 70 MMHG | HEIGHT: 63 IN | BODY MASS INDEX: 20.2 KG/M2 | OXYGEN SATURATION: 98 % | TEMPERATURE: 98 F | SYSTOLIC BLOOD PRESSURE: 136 MMHG | HEART RATE: 85 BPM | WEIGHT: 114 LBS

## 2025-02-11 DIAGNOSIS — F41.9 ANXIETY: Primary | Chronic | ICD-10-CM

## 2025-02-11 DIAGNOSIS — R35.0 URINARY FREQUENCY: ICD-10-CM

## 2025-02-11 DIAGNOSIS — M25.50 POLYARTHRALGIA: ICD-10-CM

## 2025-02-11 DIAGNOSIS — Z23 NEED FOR INFLUENZA VACCINATION: ICD-10-CM

## 2025-02-11 DIAGNOSIS — I10 ESSENTIAL HYPERTENSION: ICD-10-CM

## 2025-02-11 DIAGNOSIS — D50.8 IRON DEFICIENCY ANEMIA SECONDARY TO INADEQUATE DIETARY IRON INTAKE: ICD-10-CM

## 2025-02-11 DIAGNOSIS — N18.31 STAGE 3A CHRONIC KIDNEY DISEASE: ICD-10-CM

## 2025-02-11 LAB
BACTERIA #/AREA URNS AUTO: ABNORMAL /HPF
BILIRUB UR QL STRIP: NEGATIVE
CLARITY UR REFRACT.AUTO: ABNORMAL
COLOR UR AUTO: YELLOW
GLUCOSE UR QL STRIP: NEGATIVE
HGB UR QL STRIP: NEGATIVE
HYALINE CASTS UR QL AUTO: 3 /LPF
KETONES UR QL STRIP: NEGATIVE
LEUKOCYTE ESTERASE UR QL STRIP: ABNORMAL
MICROSCOPIC COMMENT: ABNORMAL
NITRITE UR QL STRIP: POSITIVE
PH UR STRIP: 7 [PH] (ref 5–8)
PROT UR QL STRIP: ABNORMAL
RBC #/AREA URNS AUTO: 2 /HPF (ref 0–4)
SP GR UR STRIP: 1.02 (ref 1–1.03)
SQUAMOUS #/AREA URNS AUTO: 1 /HPF
URN SPEC COLLECT METH UR: ABNORMAL
WBC #/AREA URNS AUTO: 90 /HPF (ref 0–5)

## 2025-02-11 PROCEDURE — 1126F AMNT PAIN NOTED NONE PRSNT: CPT | Mod: CPTII,S$GLB,, | Performed by: NURSE PRACTITIONER

## 2025-02-11 PROCEDURE — 99214 OFFICE O/P EST MOD 30 MIN: CPT | Mod: S$GLB,,, | Performed by: NURSE PRACTITIONER

## 2025-02-11 PROCEDURE — 90653 IIV ADJUVANT VACCINE IM: CPT | Mod: S$GLB,,, | Performed by: NURSE PRACTITIONER

## 2025-02-11 PROCEDURE — 87086 URINE CULTURE/COLONY COUNT: CPT | Performed by: NURSE PRACTITIONER

## 2025-02-11 PROCEDURE — 99999 PR PBB SHADOW E&M-EST. PATIENT-LVL III: CPT | Mod: PBBFAC,,, | Performed by: NURSE PRACTITIONER

## 2025-02-11 PROCEDURE — 1160F RVW MEDS BY RX/DR IN RCRD: CPT | Mod: CPTII,S$GLB,, | Performed by: NURSE PRACTITIONER

## 2025-02-11 PROCEDURE — 3288F FALL RISK ASSESSMENT DOCD: CPT | Mod: CPTII,S$GLB,, | Performed by: NURSE PRACTITIONER

## 2025-02-11 PROCEDURE — 81001 URINALYSIS AUTO W/SCOPE: CPT | Performed by: NURSE PRACTITIONER

## 2025-02-11 PROCEDURE — 1159F MED LIST DOCD IN RCRD: CPT | Mod: CPTII,S$GLB,, | Performed by: NURSE PRACTITIONER

## 2025-02-11 PROCEDURE — 1101F PT FALLS ASSESS-DOCD LE1/YR: CPT | Mod: CPTII,S$GLB,, | Performed by: NURSE PRACTITIONER

## 2025-02-11 PROCEDURE — G2211 COMPLEX E/M VISIT ADD ON: HCPCS | Mod: S$GLB,,, | Performed by: NURSE PRACTITIONER

## 2025-02-11 PROCEDURE — G0008 ADMIN INFLUENZA VIRUS VAC: HCPCS | Mod: S$GLB,,, | Performed by: NURSE PRACTITIONER

## 2025-02-11 RX ORDER — ALPRAZOLAM 0.25 MG/1
0.25 TABLET ORAL 2 TIMES DAILY PRN
Qty: 60 TABLET | Refills: 0 | Status: SHIPPED | OUTPATIENT
Start: 2025-02-22

## 2025-02-11 NOTE — PROGRESS NOTES
SUBJECTIVE:      Patient ID: Martha Alonso is a 84 y.o. female.    Chief Complaint: Follow-up (Patient reports having a heavy feeling last week in her abdominal area. )    History of Present Illness    CHIEF COMPLAINT:  Ms. Alonso presents for a follow-up visit regarding anxiety and medication management, with additional concerns about arthritis and edema.    HPI:  Ms. Alonso reports ongoing anxiety with fluctuating severity. She is under rheumatologist care for arthritis and continues physical therapy, which she finds beneficial. Ms. Alonso has variable edema, particularly in her feet and legs, with one instance of severe swelling extending up her leg. She has intermittent urinary frequency and lower bladder pressure. Previously reported dysuria has resolved. Ms. Alonso's daughter observed dark, orange-colored urine, potentially due to inadequate fluid intake. Ms. Alonso is asymptomatic for urinary symptoms today.    Iron supplementation appears to be improving the patient's iron levels and reducing constipation. She manages her blood pressure with medication, adjusting amlodipine dosage based on readings.    Ms. Alonso denies current dysuria, hematuria, chest pain, dyspnea, and leg swelling.    MEDICATIONS:  Ms. Alonso is on Xanax for anxiety, which was refilled on the 21st of last month. She takes Benazepril 20 mg daily and Amlodipine 10 mg daily for blood pressure, though she sometimes takes 5 mg of Amlodipine depending on her blood pressure readings. She is on a daily iron supplement for anemia and takes Tylenol as needed for arthritis pain.    MEDICAL HISTORY:  Ms. Alonso has a history of anxiety, arthritis, anemia, hypertension, and chronic kidney disease, which has shown improvement.    TEST RESULTS:  Ms. Alonso's TRANG, rheumatoid factor, and rheumatoid arthritis tests were all negative. Her EGFR (Glomerular Filtration Rate) has improved from a previous value of 49 to now being greater than 60.  Cholesterol levels are stable but slightly elevated. Thyroid levels are normal. The CBC (Complete Blood Count) shows a hemoglobin of 12.2, hematocrit of 39, and normal RBCs. Iron saturation has improved from 6 to 11 and is now at 15. Ferritin levels, while still on the low end, are at their highest in the past year.        Review of Systems   Constitutional:  Negative for activity change, appetite change, chills, diaphoresis, fatigue, fever and unexpected weight change.   HENT:  Negative for congestion, ear pain, sinus pressure, sore throat, trouble swallowing and voice change.    Eyes:  Negative for pain, discharge and visual disturbance.   Respiratory:  Negative for cough, chest tightness, shortness of breath and wheezing.    Cardiovascular:  Positive for leg swelling. Negative for chest pain and palpitations.   Gastrointestinal:  Negative for abdominal pain, constipation, diarrhea, nausea and vomiting.   Genitourinary:  Positive for frequency. Negative for difficulty urinating, dysuria, flank pain, urgency, vaginal bleeding, vaginal discharge and vaginal pain.   Musculoskeletal:  Positive for arthralgias. Negative for back pain and joint swelling.   Skin:  Negative for color change and rash.   Neurological:  Negative for dizziness, seizures, syncope, weakness, numbness and headaches.   Hematological:  Negative for adenopathy.   Psychiatric/Behavioral:  Negative for dysphoric mood and sleep disturbance. The patient is nervous/anxious.        No family history on file.   Social History     Socioeconomic History    Marital status:    Occupational History    Occupation: retired   Tobacco Use    Smoking status: Never    Smokeless tobacco: Never   Substance and Sexual Activity    Alcohol use: Not Currently    Drug use: Never    Sexual activity: Not Currently     Current Outpatient Medications   Medication Sig Dispense Refill    amLODIPine (NORVASC) 10 MG tablet Take 1 tablet (10 mg total) by mouth once daily. 90  "tablet 3    benazepriL (LOTENSIN) 20 MG tablet Take 1 tablet (20 mg total) by mouth once daily. 90 tablet 3    diclofenac sodium (VOLTAREN) 1 % Gel Apply topically 2 (two) times daily. 450 g 3    famotidine (PEPCID) 20 MG tablet TAKE 1 TABLET(20 MG) BY MOUTH TWICE DAILY 180 tablet 1    ferrous gluconate (FERGON) 324 MG tablet Take 1 tablet (324 mg total) by mouth daily with breakfast. 90 tablet 1    ondansetron (ZOFRAN-ODT) 4 MG TbDL Take 1 tablet (4 mg total) by mouth every 12 (twelve) hours as needed (nausea). 21 tablet 0    [START ON 2/22/2025] ALPRAZolam (XANAX) 0.25 MG tablet Take 1 tablet (0.25 mg total) by mouth 2 (two) times daily as needed for Anxiety. 60 tablet 0     No current facility-administered medications for this visit.     Review of patient's allergies indicates:  No Known Allergies   Past Medical History:   Diagnosis Date    Hypertension     Rheumatoid arthritis      History reviewed. No pertinent surgical history.       OBJECTIVE:      Vitals:    02/11/25 1344   BP: 136/70   BP Location: Right arm   Patient Position: Sitting   Pulse: 85   Temp: 97.6 °F (36.4 °C)   TempSrc: Oral   SpO2: 98%   Weight: 51.7 kg (114 lb)   Height: 5' 3" (1.6 m)     Physical Exam  Vitals and nursing note reviewed.   Constitutional:       General: She is awake. She is not in acute distress.     Appearance: Normal appearance. She is well-developed, well-groomed and normal weight. She is not ill-appearing, toxic-appearing or diaphoretic.   HENT:      Head: Normocephalic and atraumatic.      Nose: Nose normal.   Eyes:      General: Lids are normal. Gaze aligned appropriately.      Conjunctiva/sclera: Conjunctivae normal.      Right eye: Right conjunctiva is not injected.      Left eye: Left conjunctiva is not injected.      Pupils: Pupils are equal, round, and reactive to light.   Cardiovascular:      Rate and Rhythm: Normal rate and regular rhythm.      Pulses: Normal pulses.      Heart sounds: Normal heart sounds, S1 " normal and S2 normal. No murmur heard.     No friction rub. No gallop.   Pulmonary:      Effort: Pulmonary effort is normal. No respiratory distress.      Breath sounds: Normal breath sounds. No stridor. No decreased breath sounds, wheezing, rhonchi or rales.   Chest:      Chest wall: No tenderness.   Musculoskeletal:      Cervical back: Neck supple.      Right lower le+ Edema present.      Left lower le+ Edema present.   Lymphadenopathy:      Cervical: No cervical adenopathy.   Skin:     General: Skin is warm and dry.      Capillary Refill: Capillary refill takes less than 2 seconds.      Findings: No erythema or rash.   Neurological:      Mental Status: She is alert and oriented to person, place, and time. Mental status is at baseline.   Psychiatric:         Attention and Perception: Attention normal.         Mood and Affect: Mood normal.         Speech: Speech normal.         Behavior: Behavior normal. Behavior is cooperative.         Thought Content: Thought content normal.         Judgment: Judgment normal.       Lab Visit on 2025   Component Date Value Ref Range Status    Rheumatoid Factor 2025 <10.0  0.0 - 15.0 IU/mL Final    Comment: Warning:  Pathologically high levels of IgG (>2500 mg/dL) may   interfere with quantification of RF.      CCP Antibodies 2025 3  0 - 19 units Final    Comment: Negative               <20  Weak positive      20 - 39  Moderate positive  40 - 59  Strong positive        >59  Performed at:  [facility] [address]  [id number]  : Marty Sher MD, Phone:  [phone]      TRANG 2025 Negative   Final    Comment: Negative   <1:80  Borderline  1:80  Positive   >1:80  ICAP nomenclature: AC-0  For more information about Hep-2 cell  patterns use ANApatterns.org, the official  website for the International Consensus on  Antinuclear Antibody (TRANG) Patterns (ICAP).  Performed at:  [facility] [address]  [place], [place]  [id number]  : Marty Sher MD, Phone:  [phone]      WBC 01/28/2025 6.08  3.90 - 12.70 K/uL Final    RBC 01/28/2025 4.38  4.00 - 5.40 M/uL Final    Hemoglobin 01/28/2025 12.2  12.0 - 16.0 g/dL Final    Hematocrit 01/28/2025 39.0  37.0 - 48.5 % Final    MCV 01/28/2025 89  82 - 98 fL Final    MCH 01/28/2025 27.9  27.0 - 31.0 pg Final    MCHC 01/28/2025 31.3 (L)  32.0 - 36.0 g/dL Final    RDW 01/28/2025 14.9 (H)  11.5 - 14.5 % Final    Platelets 01/28/2025 172  150 - 450 K/uL Final    MPV 01/28/2025 10.8  9.2 - 12.9 fL Final    Immature Granulocytes 01/28/2025 0.2  0.0 - 0.5 % Final    Gran # (ANC) 01/28/2025 3.0  1.8 - 7.7 K/uL Final    Immature Grans (Abs) 01/28/2025 0.01  0.00 - 0.04 K/uL Final    Comment: Mild elevation in immature granulocytes is non specific and   can be seen in a variety of conditions including stress response,   acute inflammation, trauma and pregnancy. Correlation with other   laboratory and clinical findings is essential.      Lymph # 01/28/2025 2.5  1.0 - 4.8 K/uL Final    Mono # 01/28/2025 0.4  0.3 - 1.0 K/uL Final    Eos # 01/28/2025 0.2  0.0 - 0.5 K/uL Final    Baso # 01/28/2025 0.04  0.00 - 0.20 K/uL Final    nRBC 01/28/2025 0  0 /100 WBC Final    Gran % 01/28/2025 49.4  38.0 - 73.0 % Final    Lymph % 01/28/2025 40.5  18.0 - 48.0 % Final    Mono % 01/28/2025 6.6  4.0 - 15.0 % Final    Eosinophil % 01/28/2025 2.6  0.0 - 8.0 % Final    Basophil % 01/28/2025 0.7  0.0 - 1.9 % Final    Differential Method 01/28/2025 Automated   Final    Sodium 01/28/2025 138  136 - 145 mmol/L Final    Potassium 01/28/2025 4.2  3.5 - 5.1 mmol/L Final    Chloride 01/28/2025 106  95 - 110 mmol/L Final    CO2 01/28/2025 24  23 - 29 mmol/L Final    Glucose 01/28/2025 82  70 - 110 mg/dL Final    BUN 01/28/2025 21  8 - 23 mg/dL Final    Creatinine 01/28/2025 0.9  0.5 - 1.4 mg/dL Final    Calcium 01/28/2025 9.6  8.7 - 10.5 mg/dL Final    Total Protein 01/28/2025 6.8  6.0 -  8.4 g/dL Final    Albumin 01/28/2025 4.2  3.5 - 5.2 g/dL Final    Total Bilirubin 01/28/2025 0.7  0.1 - 1.0 mg/dL Final    Comment: For infants and newborns, interpretation of results should be based  on gestational age, weight and in agreement with clinical  observations.    Premature Infant recommended reference ranges:  Up to 24 hours.............<8.0 mg/dL  Up to 48 hours............<12.0 mg/dL  3-5 days..................<15.0 mg/dL  6-29 days.................<15.0 mg/dL      Alkaline Phosphatase 01/28/2025 57  55 - 135 U/L Final    AST 01/28/2025 13  10 - 40 U/L Final    ALT 01/28/2025 5 (L)  10 - 44 U/L Final    eGFR 01/28/2025 >60.0  >60 mL/min/1.73 m^2 Final    Anion Gap 01/28/2025 8  8 - 16 mmol/L Final    Cholesterol 01/28/2025 186  120 - 199 mg/dL Final    Comment: The National Cholesterol Education Program (NCEP) has set the  following guidelines (reference ranges) for Cholesterol:  Optimal.....................<200 mg/dL  Borderline High.............200-239 mg/dL  High........................> or = 240 mg/dL      Triglycerides 01/28/2025 100  30 - 150 mg/dL Final    Comment: The National Cholesterol Education Program (NCEP) has set the  following guidelines (reference values) for triglycerides:  Normal......................<150 mg/dL  Borderline High.............150-199 mg/dL  High........................200-499 mg/dL      HDL 01/28/2025 56  40 - 75 mg/dL Final    Comment: The National Cholesterol Education Program (NCEP) has set the  following guidelines (reference values) for HDL Cholesterol:  Low...............<40 mg/dL  Optimal...........>60 mg/dL      LDL Cholesterol 01/28/2025 110.0  63.0 - 159.0 mg/dL Final    Comment: The National Cholesterol Education Program (NCEP) has set the  following guidelines (reference values) for LDL Cholesterol:  Optimal.......................<130 mg/dL  Borderline High...............130-159 mg/dL  High..........................160-189 mg/dL  Very  High.....................>190 mg/dL      HDL/Cholesterol Ratio 01/28/2025 30.1  20.0 - 50.0 % Final    Total Cholesterol/HDL Ratio 01/28/2025 3.3  2.0 - 5.0 Final    Non-HDL Cholesterol 01/28/2025 130  mg/dL Final    Comment: Risk category and Non-HDL cholesterol goals:  Coronary heart disease (CHD)or equivalent (10-year risk of CHD >20%):  Non-HDL cholesterol goal     <130 mg/dL  Two or more CHD risk factors and 10-year risk of CHD <= 20%:  Non-HDL cholesterol goal     <160 mg/dL  0 to 1 CHD risk factor:  Non-HDL cholesterol goal     <190 mg/dL      TSH 01/28/2025 1.373  0.340 - 5.600 uIU/mL Final    Ferritin 01/28/2025 15.1 (L)  20.0 - 300.0 ng/mL Final    Iron 01/28/2025 58  30 - 160 ug/dL Final    Transferrin 01/28/2025 284  200 - 375 mg/dL Final    TIBC 01/28/2025 398  250 - 450 ug/dL Final    Saturated Iron 01/28/2025 15 (L)  20 - 50 % Final   Lab Visit on 11/19/2024   Component Date Value Ref Range Status    Urine Culture, Routine 11/19/2024 Multiple organisms isolated. None in predominance.  Repeat if   Final    Urine Culture, Routine 11/19/2024 clinically necessary.   Final   Office Visit on 11/19/2024   Component Date Value Ref Range Status    POC Blood, Urine 11/19/2024 Negative  Negative Final    POC Bilirubin, Urine 11/19/2024 Negative  Negative Final    POC Urobilinogen, Urine 11/19/2024 1.0  0.1 - 1.1 Final    POC Ketones, Urine 11/19/2024 Negative  Negative Final    POC Protein, Urine 11/19/2024 Negative  Negative Final    POC Nitrates, Urine 11/19/2024 Negative  Negative Final    POC Glucose, Urine 11/19/2024 Negative  Negative Final    pH, UA 11/19/2024 5.0  5.0 - 8.0 Final    POC Specific Gravity, Urine 11/19/2024 1.025  1.003 - 1.029 Final    POC Leukocytes, Urine 11/19/2024 Positive (A)  Negative Final        Assessment:       1. Anxiety    2. Urinary frequency    3. Essential hypertension    4. Iron deficiency anemia secondary to inadequate dietary iron intake    5. Stage 3a chronic kidney  disease    6. Polyarthralgia    7. Need for influenza vaccination        Plan:       Assessment & Plan    IMPRESSION:  - Assessed anxiety management and medication needs  - Reviewed rheumatology lab results: TRANG, rheumatoid factor, and other rheumatoid arthritis-specific tests negative  - Noted improvement in kidney function: EGFR now >60, indicating normal function  - Evaluated cholesterol levels: slight increase but not significantly elevated  - Monitored anemia: H&H now normal, iron saturation improving with supplementation  - Considered adjusting blood pressure medication due to edema concerns  - Decided against diuretic use to avoid potential kidney function decline and dehydration    STAGE 3A CHRONIC KIDNEY DISEASE:  - Evaluated kidney function, noting significant improvement with EGFR increasing from 49 to greater than 60 (normal range).  - This improvement could be due to better hydration or cessation of NSAIDs use.  - Explained EGFR improvement and its significance to the patient, addressing concerns about 'end stage' kidney disease and clarifying that the previous decline was likely reversible.  - Avoid NSAIDs.  - Stay hydrated.     ANXIETY:  - Monitored patient's anxiety, noting fluctuations.  - Continued Xanax prescription, aligning refill pickup date with previous prescription (last sent on 21st of last month, picked up on 23rd).  -  reviewed.     ARTHRITIS:  - Continued physical therapy for arthritis management, which is helping.  - Evaluated rheumatology labs, which were negative for TRANG, rheumatoid factor, and other rheumatoid arthritis tests.  - Noted previous prescription of meloxicam for severe pain, but reduced use due to kidney function concerns.  - Keep follow-up with Rheumatology.     ANEMIA:  - Monitored CBC, showing improvement in anemia with normal H and H (Hemoglobin 12.2, hematocrit 39).  - Iron saturation improved from 6 to 15, but still slightly low.  - Ferritin has increased to its  highest level in a year.  - Continued iron supplements to further improve iron levels and reduce anemia.    HYPERTENSION:  - Monitored blood pressure (136/70).  - Ms. Alonso reports no chest pain, shortness of breath, or leg swelling.  - Decreased amlodipine from 10 mg to 5 mg daily.  - Continued benazepril 20 mg daily.  - Instructions: Take amlodipine 5 mg if blood pressure is low.  - Contact office if blood pressure consistently reaches 140s after medication adjustment.  - Reduce the amount of salt in your diet; Lose weight; Avoid drinking too much alcohol; Exercise at least 30 minutes per day most days of the week.      EDEMA:  - Monitored patient's report of varying severity of edema in feet and up the leg.  - Acknowledged amlodipine can cause edema and reduced dosage from 10mg to 5mg to help with swelling.    URINARY SYMPTOMS:  - Monitored patient's report of intermittent frequency and lower bladder pressure.  - No current symptoms on visit day.  - No burning with urination or blood in urine, but patient noticed dark and orangey urine, possibly due to dehydration.  - Ordered urinalysis with reflex to culture at lab for more accurate results.    FLU VACCINATION:  - Informed patient about ongoing flu season, clarified vaccine efficacy and current recommendations.  - Administered flu vaccine in office.    OTHER INSTRUCTIONS:  - Results will be messaged through patient portal when available.        Anxiety  -     ALPRAZolam (XANAX) 0.25 MG tablet; Take 1 tablet (0.25 mg total) by mouth 2 (two) times daily as needed for Anxiety.  Dispense: 60 tablet; Refill: 0    Urinary frequency  -     Cancel: Urinalysis, Reflex to Urine Culture Urine, Clean Catch  -     Urinalysis, Reflex to Urine Culture Urine, Clean Catch; Future; Expected date: 02/11/2025    Essential hypertension    Iron deficiency anemia secondary to inadequate dietary iron intake    Stage 3a chronic kidney disease    Polyarthralgia    Need for influenza  vaccination  -     influenza (adjuvanted) (Fluad) 45 mcg/0.5 mL IM vaccine (> or = 64 yo) 0.5 mL    I spent a total of 31 minutes on the day of the visit.This includes face to face time and non-face to face time preparing to see the patient (eg, review of tests), obtaining and/or reviewing separately obtained history, documenting clinical information in the electronic or other health record, independently interpreting results and communicating results to the patient/family/caregiver, or care coordinator.    Follow up in about 3 months (around 5/11/2025) for Anxiety.          2/11/2025 ELINA Johnston, MARIE  This note was generated with the assistance of ambient listening technology. Verbal consent was obtained by the patient and accompanying visitor(s) for the recording of patient appointment to facilitate this note. I attest to having reviewed and edited the generated note for accuracy, though some syntax or spelling errors may persist. Please contact the author of this note for any clarification.

## 2025-02-13 ENCOUNTER — PATIENT MESSAGE (OUTPATIENT)
Dept: FAMILY MEDICINE | Facility: CLINIC | Age: 85
End: 2025-02-13
Payer: MEDICARE

## 2025-02-13 DIAGNOSIS — N39.0 URINARY TRACT INFECTION WITHOUT HEMATURIA, SITE UNSPECIFIED: Primary | ICD-10-CM

## 2025-02-13 LAB
BACTERIA UR CULT: NORMAL
BACTERIA UR CULT: NORMAL

## 2025-02-13 RX ORDER — NITROFURANTOIN 25; 75 MG/1; MG/1
100 CAPSULE ORAL 2 TIMES DAILY
Qty: 10 CAPSULE | Refills: 0 | Status: SHIPPED | OUTPATIENT
Start: 2025-02-13 | End: 2025-02-18

## 2025-04-11 DIAGNOSIS — F41.9 ANXIETY: Chronic | ICD-10-CM

## 2025-04-14 RX ORDER — ALPRAZOLAM 0.25 MG/1
0.25 TABLET ORAL 2 TIMES DAILY PRN
Qty: 60 TABLET | Refills: 0 | Status: SHIPPED | OUTPATIENT
Start: 2025-04-14

## 2025-05-08 ENCOUNTER — OFFICE VISIT (OUTPATIENT)
Dept: FAMILY MEDICINE | Facility: CLINIC | Age: 85
End: 2025-05-08
Payer: MEDICARE

## 2025-05-08 VITALS
BODY MASS INDEX: 20.08 KG/M2 | DIASTOLIC BLOOD PRESSURE: 54 MMHG | HEART RATE: 54 BPM | TEMPERATURE: 98 F | SYSTOLIC BLOOD PRESSURE: 110 MMHG | OXYGEN SATURATION: 98 % | HEIGHT: 63 IN | WEIGHT: 113.31 LBS

## 2025-05-08 DIAGNOSIS — E78.2 MIXED HYPERLIPIDEMIA: ICD-10-CM

## 2025-05-08 DIAGNOSIS — D50.8 IRON DEFICIENCY ANEMIA SECONDARY TO INADEQUATE DIETARY IRON INTAKE: ICD-10-CM

## 2025-05-08 DIAGNOSIS — N18.31 STAGE 3A CHRONIC KIDNEY DISEASE: ICD-10-CM

## 2025-05-08 DIAGNOSIS — I10 ESSENTIAL HYPERTENSION: ICD-10-CM

## 2025-05-08 DIAGNOSIS — F41.9 ANXIETY: Primary | ICD-10-CM

## 2025-05-08 DIAGNOSIS — Z23 NEED FOR PNEUMOCOCCAL 20-VALENT CONJUGATE VACCINATION: ICD-10-CM

## 2025-05-08 DIAGNOSIS — N39.0 RECURRENT UTI: ICD-10-CM

## 2025-05-08 PROCEDURE — 99999 PR PBB SHADOW E&M-EST. PATIENT-LVL III: CPT | Mod: PBBFAC,,, | Performed by: NURSE PRACTITIONER

## 2025-05-08 NOTE — PROGRESS NOTES
SUBJECTIVE:      Patient ID: Martha Alonso is a 84 y.o. female.    Chief Complaint: Follow-up (3 mon f/u)    History of Present Illness    CHIEF COMPLAINT:  Ms. Alonso presents for a follow-up visit regarding anxiety and to discuss ongoing pain issues.    HPI:  Ms. Alonso reports ongoing anxiety, described as good without further details. She has significant pain, particularly in the right hip, severe enough to interfere with daily activities such as cooking. Within 5 minutes of starting to cook, her hip begins to hurt. The pain is localized to the right side.    She has been undergoing PT for the pain, which has been helping to some extent, but posture remains a challenge. For pain management, she has been using Tylenol as suggested by a rheumatologist. She has also tried Voltaren gel, now available OTC, but reports it to be ineffective.    She is scheduled to see a rheumatologist again in 3 months. A previous suggestion was made for an MRI if the condition does not improve, to potentially identify the root cause of the pain.    She reports occasional feet swelling, though it is not present currently. The practitioner notes having seen the swelling on a previous occasion.    Ms. lAonso reports occasional urinary symptoms with mild discomfort at times, which resolves with the use of AZO (likely referring to AZO Urinary Pain Relief).    Anxiety has been stable with the use of Xanax. Symptoms are controlled.      MEDICATIONS:  Ms. Alonso is on Xanax for anxiety and Tylenol for pain management. She is also using OTC Voltaren gel for pain relief. She is taking Amlodipine for blood pressure control and iron tablets for anemia. Ms. Alonso has discontinued anti-inflammatories and meloxicam to improve kidney function.    MEDICAL HISTORY:  Ms. Alonso has a history of anxiety, anemia, kidney function issues (which have improved recently), and arthritis. Ms. Alonso has received the pneumococcal vaccine (PPSV23) in the  past.    TEST RESULTS:  Ms. Alonso's last kidney function test showed improvement, likely due to the discontinuation of anti-inflammatories. Her iron levels were last checked in January, which was 6 months ago from the current visit.    IMAGING:  An MRI has been mentioned as a potential future test if the patient's symptoms do not improve, but it has not yet been completed.        Review of Systems   Constitutional:  Negative for activity change, appetite change, chills, diaphoresis, fatigue, fever and unexpected weight change.   HENT:  Positive for hearing loss. Negative for congestion, ear pain, sinus pressure, sore throat, trouble swallowing and voice change.    Eyes:  Negative for pain, discharge and visual disturbance.   Respiratory:  Negative for cough, chest tightness, shortness of breath and wheezing.    Cardiovascular:  Negative for chest pain and palpitations.        HTN and hyperlipidemia (managed with diet)   Gastrointestinal:  Negative for abdominal pain, constipation, diarrhea, nausea and vomiting.   Genitourinary:  Negative for difficulty urinating, flank pain, frequency and urgency.   Musculoskeletal:  Positive for arthralgias and gait problem (right hip pain). Negative for back pain, joint swelling and neck pain.        Joint pain all over   Skin:  Negative for color change and rash.   Neurological:  Negative for dizziness, seizures, syncope, weakness, numbness and headaches.   Hematological:  Negative for adenopathy.        Iron deficiency anemia, compliant with iron supplements   Psychiatric/Behavioral:  Negative for dysphoric mood and sleep disturbance. The patient is nervous/anxious.        No family history on file.   Social History     Socioeconomic History    Marital status:    Occupational History    Occupation: retired   Tobacco Use    Smoking status: Never    Smokeless tobacco: Never   Substance and Sexual Activity    Alcohol use: Not Currently    Drug use: Never    Sexual activity:  "Not Currently     Current Outpatient Medications   Medication Sig    ALPRAZolam (XANAX) 0.25 MG tablet TAKE 1 TABLET(0.25 MG) BY MOUTH TWICE DAILY AS NEEDED FOR ANXIETY    amLODIPine (NORVASC) 10 MG tablet Take 1 tablet (10 mg total) by mouth once daily.    benazepriL (LOTENSIN) 20 MG tablet Take 1 tablet (20 mg total) by mouth once daily.    diclofenac sodium (VOLTAREN) 1 % Gel Apply topically 2 (two) times daily.    famotidine (PEPCID) 20 MG tablet TAKE 1 TABLET(20 MG) BY MOUTH TWICE DAILY    ferrous gluconate (FERGON) 324 MG tablet Take 1 tablet (324 mg total) by mouth daily with breakfast.    ondansetron (ZOFRAN-ODT) 4 MG TbDL Take 1 tablet (4 mg total) by mouth every 12 (twelve) hours as needed (nausea).   Last reviewed on 2/11/2025  2:29 PM by Pedro Pablo Coronado FNP-C    Review of patient's allergies indicates:  No Known Allergies   Past Medical History:   Diagnosis Date    Hypertension     Rheumatoid arthritis      No past surgical history on file.       OBJECTIVE:      Vitals:    05/08/25 1006   BP: (!) 110/54   BP Location: Left arm   Patient Position: Sitting   Pulse: (!) 54   Temp: 97.5 °F (36.4 °C)   TempSrc: Oral   SpO2: 98%   Weight: 51.4 kg (113 lb 5.1 oz)   Height: 5' 3" (1.6 m)     Physical Exam  Vitals and nursing note reviewed.   Constitutional:       General: She is awake. She is not in acute distress.     Appearance: Normal appearance. She is well-developed, well-groomed and normal weight. She is not ill-appearing, toxic-appearing or diaphoretic.   HENT:      Head: Normocephalic and atraumatic.      Nose: Nose normal.   Eyes:      General: Lids are normal. Gaze aligned appropriately.      Conjunctiva/sclera: Conjunctivae normal.      Right eye: Right conjunctiva is not injected.      Left eye: Left conjunctiva is not injected.      Pupils: Pupils are equal, round, and reactive to light.   Cardiovascular:      Rate and Rhythm: Normal rate and regular rhythm.      Pulses: Normal pulses. "      Heart sounds: Normal heart sounds, S1 normal and S2 normal. No murmur heard.     Comments: Trace edema  Pulmonary:      Effort: Pulmonary effort is normal. No respiratory distress.      Breath sounds: Normal breath sounds. No stridor. No decreased breath sounds, wheezing, rhonchi or rales.   Chest:      Chest wall: No tenderness.   Musculoskeletal:      Cervical back: Neck supple.      Right lower leg: Edema present.      Left lower leg: Edema present.   Lymphadenopathy:      Cervical: No cervical adenopathy.   Skin:     General: Skin is warm and dry.      Capillary Refill: Capillary refill takes less than 2 seconds.      Findings: No erythema or rash.   Neurological:      Mental Status: She is alert and oriented to person, place, and time. Mental status is at baseline.   Psychiatric:         Attention and Perception: Attention normal.         Mood and Affect: Mood normal.         Speech: Speech normal.         Behavior: Behavior normal. Behavior is cooperative.         Thought Content: Thought content normal.         Judgment: Judgment normal.       Office Visit on 02/11/2025   Component Date Value Ref Range Status    Specimen UA 02/11/2025 Urine, Clean Catch   Final    Color, UA 02/11/2025 Yellow  Yellow, Straw, [place] Final    Appearance, UA 02/11/2025 Hazy (A)  Clear Final    pH, UA 02/11/2025 7.0  5.0 - 8.0 Final    Specific Gravity, UA 02/11/2025 1.020  1.005 - 1.030 Final    Protein, UA 02/11/2025 Trace (A)  Negative Final    Comment: Recommend a 24 hour urine protein or a urine   protein/creatinine ratio if globulin induced proteinuria is  clinically suspected.      Glucose, UA 02/11/2025 Negative  Negative Final    Ketones, UA 02/11/2025 Negative  Negative Final    Bilirubin (UA) 02/11/2025 Negative  Negative Final    Occult Blood UA 02/11/2025 Negative  Negative Final    Nitrite, UA 02/11/2025 Positive (A)  Negative Final    Leukocytes, UA 02/11/2025 3+ (A)  Negative Final    RBC, UA 02/11/2025 2  0 -  4 /hpf Final    WBC, UA 02/11/2025 90 (H)  0 - 5 /hpf Final    Bacteria 02/11/2025 Many (A)  None-Occ /hpf Final    Squam Epithel, UA 02/11/2025 1  /hpf Final    Hyaline Casts, UA 02/11/2025 3 (A)  0-1/lpf /lpf Final    Microscopic Comment 02/11/2025 SEE COMMENT   Final    Comment: Other formed elements not mentioned in the report are not   present in the microscopic examination.       Urine Culture, Routine 02/11/2025 Multiple organisms isolated. None in predominance.  Repeat if   Final    Urine Culture, Routine 02/11/2025 clinically necessary.   Final   Lab Visit on 01/28/2025   Component Date Value Ref Range Status    Rheumatoid Factor 01/28/2025 <10.0  0.0 - 15.0 IU/mL Final    Comment: Warning:  Pathologically high levels of IgG (>2500 mg/dL) may   interfere with quantification of RF.      CCP Antibodies 01/28/2025 3  0 - 19 units Final    Comment: Negative               <20  Weak positive      20 - 39  Moderate positive  40 - 59  Strong positive        >59  Performed at:  [facility]  [id number]  : Marty Sher MD, Phone:  [phone]      TRANG 01/28/2025 Negative   Final    Comment: Negative   <1:80  Borderline  1:80  Positive   >1:80  ICAP nomenclature: AC-0  For more information about Hep-2 cell  patterns use ANApatterns.org, the official  website for the International Consensus on  Antinuclear Antibody (TRANG) Patterns (ICAP).  Performed at:  [facility]  [id number]  : Marty Sher MD, Phone:  [phone]      WBC 01/28/2025 6.08  3.90 - 12.70 K/uL Final    RBC 01/28/2025 4.38  4.00 - 5.40 M/uL Final    Hemoglobin 01/28/2025 12.2  12.0 - 16.0 g/dL Final    Hematocrit 01/28/2025 39.0  37.0 - 48.5 % Final    MCV 01/28/2025 89  82 - 98 fL Final    MCH 01/28/2025 27.9  27.0 - 31.0 pg Final    MCHC 01/28/2025 31.3 (L)  32.0 - 36.0 g/dL Final    RDW 01/28/2025 14.9 (H)  11.5 - 14.5 % Final     Platelets 01/28/2025 172  150 - 450 K/uL Final    MPV 01/28/2025 10.8  9.2 - 12.9 fL Final    Immature Granulocytes 01/28/2025 0.2  0.0 - 0.5 % Final    Gran # (ANC) 01/28/2025 3.0  1.8 - 7.7 K/uL Final    Immature Grans (Abs) 01/28/2025 0.01  0.00 - 0.04 K/uL Final    Comment: Mild elevation in immature granulocytes is non specific and   can be seen in a variety of conditions including stress response,   acute inflammation, trauma and pregnancy. Correlation with other   laboratory and clinical findings is essential.      Lymph # 01/28/2025 2.5  1.0 - 4.8 K/uL Final    Mono # 01/28/2025 0.4  0.3 - 1.0 K/uL Final    Eos # 01/28/2025 0.2  0.0 - 0.5 K/uL Final    Baso # 01/28/2025 0.04  0.00 - 0.20 K/uL Final    nRBC 01/28/2025 0  0 /100 WBC Final    Gran % 01/28/2025 49.4  38.0 - 73.0 % Final    Lymph % 01/28/2025 40.5  18.0 - 48.0 % Final    Mono % 01/28/2025 6.6  4.0 - 15.0 % Final    Eosinophil % 01/28/2025 2.6  0.0 - 8.0 % Final    Basophil % 01/28/2025 0.7  0.0 - 1.9 % Final    Differential Method 01/28/2025 Automated   Final    Sodium 01/28/2025 138  136 - 145 mmol/L Final    Potassium 01/28/2025 4.2  3.5 - 5.1 mmol/L Final    Chloride 01/28/2025 106  95 - 110 mmol/L Final    CO2 01/28/2025 24  23 - 29 mmol/L Final    Glucose 01/28/2025 82  70 - 110 mg/dL Final    BUN 01/28/2025 21  8 - 23 mg/dL Final    Creatinine 01/28/2025 0.9  0.5 - 1.4 mg/dL Final    Calcium 01/28/2025 9.6  8.7 - 10.5 mg/dL Final    Total Protein 01/28/2025 6.8  6.0 - 8.4 g/dL Final    Albumin 01/28/2025 4.2  3.5 - 5.2 g/dL Final    Total Bilirubin 01/28/2025 0.7  0.1 - 1.0 mg/dL Final    Comment: For infants and newborns, interpretation of results should be based  on gestational age, weight and in agreement with clinical  observations.    Premature Infant recommended reference ranges:  Up to 24 hours.............<8.0 mg/dL  Up to 48 hours............<12.0 mg/dL  3-5 days..................<15.0 mg/dL  6-29 days.................<15.0 mg/dL       Alkaline Phosphatase 01/28/2025 57  55 - 135 U/L Final    AST 01/28/2025 13  10 - 40 U/L Final    ALT 01/28/2025 5 (L)  10 - 44 U/L Final    eGFR 01/28/2025 >60.0  >60 mL/min/1.73 m^2 Final    Anion Gap 01/28/2025 8  8 - 16 mmol/L Final    Cholesterol 01/28/2025 186  120 - 199 mg/dL Final    Comment: The National Cholesterol Education Program (NCEP) has set the  following guidelines (reference ranges) for Cholesterol:  Optimal.....................<200 mg/dL  Borderline High.............200-239 mg/dL  High........................> or = 240 mg/dL      Triglycerides 01/28/2025 100  30 - 150 mg/dL Final    Comment: The National Cholesterol Education Program (NCEP) has set the  following guidelines (reference values) for triglycerides:  Normal......................<150 mg/dL  Borderline High.............150-199 mg/dL  High........................200-499 mg/dL      HDL 01/28/2025 56  40 - 75 mg/dL Final    Comment: The National Cholesterol Education Program (NCEP) has set the  following guidelines (reference values) for HDL Cholesterol:  Low...............<40 mg/dL  Optimal...........>60 mg/dL      LDL Cholesterol 01/28/2025 110.0  63.0 - 159.0 mg/dL Final    Comment: The National Cholesterol Education Program (NCEP) has set the  following guidelines (reference values) for LDL Cholesterol:  Optimal.......................<130 mg/dL  Borderline High...............130-159 mg/dL  High..........................160-189 mg/dL  Very High.....................>190 mg/dL      HDL/Cholesterol Ratio 01/28/2025 30.1  20.0 - 50.0 % Final    Total Cholesterol/HDL Ratio 01/28/2025 3.3  2.0 - 5.0 Final    Non-HDL Cholesterol 01/28/2025 130  mg/dL Final    Comment: Risk category and Non-HDL cholesterol goals:  Coronary heart disease (CHD)or equivalent (10-year risk of CHD >20%):  Non-HDL cholesterol goal     <130 mg/dL  Two or more CHD risk factors and 10-year risk of CHD <= 20%:  Non-HDL cholesterol goal     <160 mg/dL  0 to 1 CHD risk  factor:  Non-HDL cholesterol goal     <190 mg/dL      TSH 01/28/2025 1.373  0.340 - 5.600 uIU/mL Final    Ferritin 01/28/2025 15.1 (L)  20.0 - 300.0 ng/mL Final    Iron 01/28/2025 58  30 - 160 ug/dL Final    Transferrin 01/28/2025 284  200 - 375 mg/dL Final    TIBC 01/28/2025 398  250 - 450 ug/dL Final    Saturated Iron 01/28/2025 15 (L)  20 - 50 % Final   Lab Visit on 11/19/2024   Component Date Value Ref Range Status    Urine Culture, Routine 11/19/2024 Multiple organisms isolated. None in predominance.  Repeat if   Final    Urine Culture, Routine 11/19/2024 clinically necessary.   Final   Office Visit on 11/19/2024   Component Date Value Ref Range Status    POC Blood, Urine 11/19/2024 Negative  Negative Final    POC Bilirubin, Urine 11/19/2024 Negative  Negative Final    POC Urobilinogen, Urine 11/19/2024 1.0  0.1 - 1.1 Final    POC Ketones, Urine 11/19/2024 Negative  Negative Final    POC Protein, Urine 11/19/2024 Negative  Negative Final    POC Nitrates, Urine 11/19/2024 Negative  Negative Final    POC Glucose, Urine 11/19/2024 Negative  Negative Final    pH, UA 11/19/2024 5.0  5.0 - 8.0 Final    POC Specific Gravity, Urine 11/19/2024 1.025  1.003 - 1.029 Final    POC Leukocytes, Urine 11/19/2024 Positive (A)  Negative Final          Assessment:       1. Anxiety    2. Essential hypertension    3. Iron deficiency anemia secondary to inadequate dietary iron intake    4. Stage 3a chronic kidney disease    5. Mixed hyperlipidemia    6. Recurrent UTI    7. Need for pneumococcal 20-valent conjugate vaccination        Plan:       Assessment & Plan    PLAN SUMMARY:  - Continue Tylenol and Voltaren gel as recommended by rheumatologist  - Avoid Toradol, meloxicam, or other NSAIDs due to renal concerns  - Ordered urinalysis with reflex to culture due to recurrent UTIs  - Administered 20-valent pneumonia vaccine (PCV20) in office  - Provided Xanax refill  - Ordered repeat labs  - If persistent feet swelling, decrease  amlodipine from 10 mg to 5 mg  - Continue iron supplementation  - Schedule another rheumatology appointment  - Return in 3 months with completed labs    HYPERTENSION:  - Blood pressure is currently well-controlled at 110/54 with consistently normal readings.  - If feet swelling persists, patient instructed to decrease amlodipine from 10 mg to 5 mg at home and monitor BP response.  - Continue Amlodipine 10 mg and benazepril 20 mg.     ANXIETY:  - Anxiety is well-controlled on current therapy.  - Xanax refill provided today.  -  reviewed.  - Ms. Alonso will continue follow-up for anxiety management.    RIGHT HIP PAIN:  - Right hip pain remains significant.  - Ms. Alonso advised to continue using Tylenol and Voltaren gel as recommended by rheumatologist.  - Will schedule another rheumatology appointment for further evaluation.    KIDNEY FUNCTION:  - Kidney function has improved after discontinuation of anti-inflammatory medications.  - Advised continued avoidance of Toradol and meloxicam due to renal concerns.  - Remain hydrated.    ANEMIA:  - Ms. Alonso continues iron supplementation.  - Ordered repeat labs for next visit in approximately 3 months (last checked in January).    FOOT EDEMA:  - Ms. Alonso experiences occasional feet swelling.  - Instructed to elevate feet and maintain low sodium intake.  - If swelling continues, advised to decrease amlodipine to 5 mg as noted in hypertension plan.    DYSURIA:  - Ordered urinalysis with reflex to culture for evaluation of occasional dysuria.    PREVENTIVE CARE:  - Administered the newer 20-valent pneumonia vaccine (PCV20) in office today.  - Explained the difference between 23-valent and 20-valent versions and advised it should be well-tolerated similar to flu vaccine.    FOLLOW-UP:  - Return in 3 months.  - Complete ordered labs prior to next visit.        Anxiety  -     CBC Auto Differential; Future; Expected date: 05/08/2025  -     Comprehensive Metabolic Panel;  Future; Expected date: 05/08/2025  -     TSH; Future; Expected date: 05/08/2025    Essential hypertension  -     CBC Auto Differential; Future; Expected date: 05/08/2025  -     Comprehensive Metabolic Panel; Future; Expected date: 05/08/2025  -     Lipid Panel; Future; Expected date: 05/08/2025  -     TSH; Future; Expected date: 05/08/2025  -     Microalbumin/Creatinine Ratio, Urine; Future; Expected date: 05/08/2025  -     Urinalysis, Reflex to Urine Culture Urine, Clean Catch    Iron deficiency anemia secondary to inadequate dietary iron intake  -     CBC Auto Differential; Future; Expected date: 05/08/2025  -     Comprehensive Metabolic Panel; Future; Expected date: 05/08/2025  -     Iron and TIBC; Future; Expected date: 05/08/2025  -     Ferritin; Future; Expected date: 05/08/2025  -     Microalbumin/Creatinine Ratio, Urine; Future; Expected date: 05/08/2025    Stage 3a chronic kidney disease  -     CBC Auto Differential; Future; Expected date: 05/08/2025  -     Comprehensive Metabolic Panel; Future; Expected date: 05/08/2025  -     Microalbumin/Creatinine Ratio, Urine; Future; Expected date: 05/08/2025    Mixed hyperlipidemia  -     CBC Auto Differential; Future; Expected date: 05/08/2025  -     Comprehensive Metabolic Panel; Future; Expected date: 05/08/2025  -     Lipid Panel; Future; Expected date: 05/08/2025  -     TSH; Future; Expected date: 05/08/2025    Recurrent UTI  -     Urinalysis, Reflex to Urine Culture Urine, Clean Catch    Need for pneumococcal 20-valent conjugate vaccination  -     pneumoc 20-debbie conj-dip cr(PF) (PREVNAR-20 (PF)) injection Syrg 0.5 mL    I spent a total of 18 minutes on the day of the visit.This includes face to face time and non-face to face time preparing to see the patient (eg, review of tests), obtaining and/or reviewing separately obtained history, documenting clinical information in the electronic or other health record, independently interpreting results and communicating  results to the patient/family/caregiver, or care coordinator.    Follow up in about 3 months (around 8/8/2025) for Anxiety.          5/8/2025 ELINA Johnston, MARIE    This note was generated with the assistance of ambient listening technology. Verbal consent was obtained by the patient and accompanying visitor(s) for the recording of patient appointment to facilitate this note. I attest to having reviewed and edited the generated note for accuracy, though some syntax or spelling errors may persist. Please contact the author of this note for any clarification.

## 2025-05-22 ENCOUNTER — PATIENT MESSAGE (OUTPATIENT)
Dept: RHEUMATOLOGY | Facility: CLINIC | Age: 85
End: 2025-05-22
Payer: MEDICARE

## 2025-05-30 DIAGNOSIS — F41.9 ANXIETY: Chronic | ICD-10-CM

## 2025-06-02 RX ORDER — ALPRAZOLAM 0.25 MG/1
0.25 TABLET ORAL 2 TIMES DAILY PRN
Qty: 60 TABLET | Refills: 0 | Status: SHIPPED | OUTPATIENT
Start: 2025-06-02

## 2025-06-30 ENCOUNTER — TELEPHONE (OUTPATIENT)
Dept: FAMILY MEDICINE | Facility: CLINIC | Age: 85
End: 2025-06-30
Payer: MEDICARE

## 2025-06-30 DIAGNOSIS — U07.1 COVID-19 VIRUS INFECTION: Primary | ICD-10-CM

## 2025-06-30 NOTE — TELEPHONE ENCOUNTER
Pt is asking if you can Rx her Paxlovid or something for her to take she recently tested positive for COVID Thursday. I informed daughter that she may be out of range for the Paxlovid.  Please advise.

## 2025-06-30 NOTE — TELEPHONE ENCOUNTER
----- Message from Lilly sent at 6/30/2025 12:08 PM CDT -----  Contact: Lexie Owen wants to speak with the nurse. Lexie #[phone]

## 2025-06-30 NOTE — TELEPHONE ENCOUNTER
Spoke to pt daughter and informed her that if it would be Paxlovid is out of range for treatment. Informed daughter that normally we treat symptomatically. She understood. Informed her if pt gets worse to make an appt to see him in office.

## 2025-06-30 NOTE — TELEPHONE ENCOUNTER
----- Message from Ann sent at 6/30/2025 11:20 AM CDT -----  Contact: darío  daughter darío  calling in regarding to patient testing POSITIVE for COVID with an at home test on Thursday . Asking for a prescription for patient?  [facility] on [address]   [phone]

## 2025-07-16 DIAGNOSIS — F41.9 ANXIETY: Chronic | ICD-10-CM

## 2025-07-16 RX ORDER — ALPRAZOLAM 0.25 MG/1
0.25 TABLET ORAL 2 TIMES DAILY PRN
Qty: 60 TABLET | Refills: 0 | Status: SHIPPED | OUTPATIENT
Start: 2025-07-16

## 2025-07-17 ENCOUNTER — TELEPHONE (OUTPATIENT)
Dept: RHEUMATOLOGY | Facility: CLINIC | Age: 85
End: 2025-07-17
Payer: MEDICARE

## 2025-07-25 ENCOUNTER — TELEPHONE (OUTPATIENT)
Dept: RHEUMATOLOGY | Facility: CLINIC | Age: 85
End: 2025-07-25
Payer: MEDICARE

## 2025-07-25 NOTE — TELEPHONE ENCOUNTER
Called patient to speak with her daughter I left a voicemail to see about scheduling her on next week with  to discuss issues that's happening.

## 2025-07-28 ENCOUNTER — OFFICE VISIT (OUTPATIENT)
Dept: RHEUMATOLOGY | Facility: CLINIC | Age: 85
End: 2025-07-28
Payer: MEDICARE

## 2025-07-28 VITALS
RESPIRATION RATE: 19 BRPM | OXYGEN SATURATION: 95 % | HEART RATE: 78 BPM | HEIGHT: 63 IN | DIASTOLIC BLOOD PRESSURE: 64 MMHG | SYSTOLIC BLOOD PRESSURE: 120 MMHG | BODY MASS INDEX: 19.57 KG/M2 | WEIGHT: 110.44 LBS

## 2025-07-28 DIAGNOSIS — M25.50 POLYARTHRALGIA: ICD-10-CM

## 2025-07-28 DIAGNOSIS — M70.61 GREATER TROCHANTERIC BURSITIS OF RIGHT HIP: Primary | ICD-10-CM

## 2025-07-28 DIAGNOSIS — M62.830 SPASM OF LEFT TRAPEZIUS MUSCLE: ICD-10-CM

## 2025-07-28 PROCEDURE — 3288F FALL RISK ASSESSMENT DOCD: CPT | Mod: CPTII,S$GLB,, | Performed by: STUDENT IN AN ORGANIZED HEALTH CARE EDUCATION/TRAINING PROGRAM

## 2025-07-28 PROCEDURE — 20610 DRAIN/INJ JOINT/BURSA W/O US: CPT | Mod: RT,S$GLB,, | Performed by: STUDENT IN AN ORGANIZED HEALTH CARE EDUCATION/TRAINING PROGRAM

## 2025-07-28 PROCEDURE — 1159F MED LIST DOCD IN RCRD: CPT | Mod: CPTII,S$GLB,, | Performed by: STUDENT IN AN ORGANIZED HEALTH CARE EDUCATION/TRAINING PROGRAM

## 2025-07-28 PROCEDURE — 20552 NJX 1/MLT TRIGGER POINT 1/2: CPT | Mod: XS,S$GLB,, | Performed by: STUDENT IN AN ORGANIZED HEALTH CARE EDUCATION/TRAINING PROGRAM

## 2025-07-28 PROCEDURE — 99999 PR PBB SHADOW E&M-EST. PATIENT-LVL III: CPT | Mod: PBBFAC,,, | Performed by: STUDENT IN AN ORGANIZED HEALTH CARE EDUCATION/TRAINING PROGRAM

## 2025-07-28 PROCEDURE — 1101F PT FALLS ASSESS-DOCD LE1/YR: CPT | Mod: CPTII,S$GLB,, | Performed by: STUDENT IN AN ORGANIZED HEALTH CARE EDUCATION/TRAINING PROGRAM

## 2025-07-28 PROCEDURE — 99214 OFFICE O/P EST MOD 30 MIN: CPT | Mod: 25,S$GLB,, | Performed by: STUDENT IN AN ORGANIZED HEALTH CARE EDUCATION/TRAINING PROGRAM

## 2025-07-28 PROCEDURE — 1125F AMNT PAIN NOTED PAIN PRSNT: CPT | Mod: CPTII,S$GLB,, | Performed by: STUDENT IN AN ORGANIZED HEALTH CARE EDUCATION/TRAINING PROGRAM

## 2025-07-28 RX ORDER — TRIAMCINOLONE ACETONIDE 40 MG/ML
40 INJECTION, SUSPENSION INTRA-ARTICULAR; INTRAMUSCULAR
Status: DISCONTINUED | OUTPATIENT
Start: 2025-07-28 | End: 2025-07-28 | Stop reason: HOSPADM

## 2025-07-28 RX ORDER — LIDOCAINE HYDROCHLORIDE 10 MG/ML
2 INJECTION, SOLUTION EPIDURAL; INFILTRATION; INTRACAUDAL; PERINEURAL
Status: DISCONTINUED | OUTPATIENT
Start: 2025-07-28 | End: 2025-07-28 | Stop reason: HOSPADM

## 2025-07-28 RX ORDER — LIDOCAINE HYDROCHLORIDE 10 MG/ML
1 INJECTION, SOLUTION EPIDURAL; INFILTRATION; INTRACAUDAL; PERINEURAL
Status: DISCONTINUED | OUTPATIENT
Start: 2025-07-28 | End: 2025-07-28 | Stop reason: HOSPADM

## 2025-07-28 RX ORDER — TRAMADOL HYDROCHLORIDE 50 MG/1
50 TABLET, FILM COATED ORAL
Qty: 30 TABLET | Refills: 2 | Status: SHIPPED | OUTPATIENT
Start: 2025-07-28

## 2025-07-28 RX ADMIN — TRIAMCINOLONE ACETONIDE 40 MG: 40 INJECTION, SUSPENSION INTRA-ARTICULAR; INTRAMUSCULAR at 03:07

## 2025-07-28 RX ADMIN — LIDOCAINE HYDROCHLORIDE 1 ML: 10 INJECTION, SOLUTION EPIDURAL; INFILTRATION; INTRACAUDAL; PERINEURAL at 03:07

## 2025-07-28 RX ADMIN — LIDOCAINE HYDROCHLORIDE 2 ML: 10 INJECTION, SOLUTION EPIDURAL; INFILTRATION; INTRACAUDAL; PERINEURAL at 03:07

## 2025-07-28 NOTE — PROGRESS NOTES
RHEUMATOLOGY OUTPATIENT CLINIC NOTE    7/28/2025    Attending Rheumatologist: Christine Santiago  Primary Care Provider: Pedro Pablo Coronado FNP-C   Physician Requesting Consultation: No referring provider defined for this encounter.  Chief Complaint/Reason For Consultation:  Follow-up      Subjective:       HPI  Martha Alonso is a 84 y.o. White female who comes for evaluation of joint pain.     Interim history   -last visit with me on 1/2025  -she is coming for a sooner appointment today due to pain in left trapezius. She reports pain in right lateral hip, this time she points to the greater trochanter bursa.   -had COVID infection last month. She is tired from it. Completed PT  -tylenol is not helping her. She tried NSAIDS in the past and GFR declined so they were stopped.     Disease history     For the past 2 years or so, she has been having pain in right buttock area and lateral hip, no radiation. Occasionally has pain in the left side but right side is worse. Worse with walking and standing washing dishes. Denies any low back pain.   She has seen orthopedics in 3/2023 and had right greater trochanteric bursa injection but it did bit help. She did physical therapy   She also reports pain around right scapula when cooking, moping. Denies any shoulder pain, neck pain.   Reports pain in toes when walking, has noted some swelling in the toes at times, but this does not bother her.   Denies any hand, wrist, elbows pain.   Denies any morning stiffness. She is able to reach arms above head and get up from seated position.   Denies weight loss, oral ulcers, CP, SOB    She reports that she was told at some point that she had elevated rheumatoid factor.     Nephew with RA. He is my patient.     Denies any smoking or vaping.   Denies alcohol use  Denies illicit drugs.     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     Review of Systems   Constitutional:  Negative  for fever and unexpected weight change.   HENT:  Negative for mouth sores and trouble swallowing.    Eyes:  Positive for redness.   Respiratory:  Negative for cough and shortness of breath.    Cardiovascular:  Negative for chest pain.   Gastrointestinal:  Positive for constipation. Negative for diarrhea.   Genitourinary:  Negative for dysuria and genital sores.   Integumentary:  Negative for rash.   Neurological:  Negative for headaches.   Hematological:  Does not bruise/bleed easily.        Chronic comorbid conditions affecting medical decision making today:  Past Medical History:   Diagnosis Date    COVID 06/2025    Hypertension     Rheumatoid arthritis      History reviewed. No pertinent surgical history.  No family history on file.  Social History     Substance and Sexual Activity   Alcohol Use Not Currently     Social History     Tobacco Use   Smoking Status Never   Smokeless Tobacco Never     Social History     Substance and Sexual Activity   Drug Use Never       Current Outpatient Medications:     ALPRAZolam (XANAX) 0.25 MG tablet, Take 1 tablet (0.25 mg total) by mouth 2 (two) times daily as needed for Anxiety., Disp: 60 tablet, Rfl: 0    amLODIPine (NORVASC) 10 MG tablet, Take 1 tablet (10 mg total) by mouth once daily., Disp: 90 tablet, Rfl: 3    benazepriL (LOTENSIN) 20 MG tablet, Take 1 tablet (20 mg total) by mouth once daily., Disp: 90 tablet, Rfl: 3    diclofenac sodium (VOLTAREN) 1 % Gel, Apply topically 2 (two) times daily., Disp: 450 g, Rfl: 3    famotidine (PEPCID) 20 MG tablet, TAKE 1 TABLET(20 MG) BY MOUTH TWICE DAILY, Disp: 180 tablet, Rfl: 1    ferrous gluconate (FERGON) 324 MG tablet, Take 1 tablet (324 mg total) by mouth daily with breakfast., Disp: 90 tablet, Rfl: 1    ondansetron (ZOFRAN-ODT) 4 MG TbDL, Take 1 tablet (4 mg total) by mouth every 12 (twelve) hours as needed (nausea)., Disp: 21 tablet, Rfl: 0    traMADoL (ULTRAM) 50 mg tablet, Take 1 tablet (50 mg total) by mouth every 24  hours as needed for Pain., Disp: 30 tablet, Rfl: 2     Objective:         Vitals:    07/28/25 1452   BP: 120/64   Pulse: 78   Resp: 19       Physical Exam   Constitutional: She is oriented to person, place, and time. She appears well-developed.   HENT:   Head: Normocephalic.   Pulmonary/Chest: Effort normal.   Abdominal: Soft.   Musculoskeletal:      Cervical back: Neck supple.      Comments: Cspine FROM no tenderness.   Tspine FROM no tenderness  Lspine FROM no tenderness.  Shoulders: FROM; no synovitis;  Elbows: FROM; no synovitis; no tophi or nodules  Wrists: FROM; no synovitis;    MCPs: FROM; no synovitis;   PIPs:FROM; no synovitis;   DIPs: FROM; no synovitis; multiple heberden nodes.   HIPS: FROM  Knees: FROM; no synovitis; no instability  Ankles: FROM: no synovitis   Toes: no tenderness on palpation.       Significant tenderness in right trapezius muscle.   Point tenderness in right buttock.    Lymphadenopathy:     She has no cervical adenopathy.   Neurological: She is alert and oriented to person, place, and time.   Skin: No rash noted.   Vitals reviewed.      Reviewed old and all outside pertinent medical records available.    All lab results personally reviewed and interpreted by me.  Lab Results   Component Value Date    WBC 6.08 01/28/2025    HGB 12.2 01/28/2025    HCT 39.0 01/28/2025    MCV 89 01/28/2025    MCH 27.9 01/28/2025    MCHC 31.3 (L) 01/28/2025    RDW 14.9 (H) 01/28/2025     01/28/2025    MPV 10.8 01/28/2025       Lab Results   Component Value Date     01/28/2025    K 4.2 01/28/2025     01/28/2025    CO2 24 01/28/2025    GLU 82 01/28/2025    BUN 21 01/28/2025    CALCIUM 9.6 01/28/2025    PROT 6.8 01/28/2025    ALBUMIN 4.2 01/28/2025    BILITOT 0.7 01/28/2025    AST 13 01/28/2025    ALKPHOS 57 01/28/2025    ALT 5 (L) 01/28/2025       Lab Results   Component Value Date    COLORU Yellow 02/11/2025    APPEARANCEUA Hazy (A) 02/11/2025    SPECGRAV 1.020 02/11/2025    PHUR 7.0  "02/11/2025    PROTEINUA Trace (A) 02/11/2025    KETONESU Negative 02/11/2025    LEUKOCYTESUR 3+ (A) 02/11/2025    NITRITE Positive (A) 02/11/2025    UROBILINOGEN 2.0-3.0 (A) 04/22/2024       No results found for: "CRP"    Lab Results   Component Value Date    TRANG Negative 01/28/2025    RF <10.0 01/28/2025    CCPANTIBODIE 3 01/28/2025       No components found for: "25OHVITDTOT", "35LNJSLD1", "04VLPEJO4", "METHODNOTE"    No results found for: "URICACID"    No results found for: "QUANTIFERON", "HEPBCOREIGG", "HEPBSAB", "HEPBSAG", "HEPCAB"    Imaging:  All imaging reviewed and independently interpreted by me.     ASSESSMENT / PLAN:     Martha Alonso is a 84 y.o. White female with:    Greater trochanteric bursitis - active  -she has been having chronic pain in right hip. During last visit, I did not think it was true hip joint pain or greater trochanteric pain however today she is very tender along the greater trochanter.   -will proceed with CSI today. See procedure note. Post injection care discussed.   -referral to PT.   -continue voltaren gel as needed.   -avoid systemic NSAIDS due to CKD. Tylenol is not controlling her symptoms. Will proceed with tramadol 50 mg every 24 hours .  reviewed.   -continue heating pad in 48 hours     2. Spasm of left trapezis   -this Likely due to poor posture.   -s/p right trigger point injection on 9/2024  -proceed with left trapezius trigger point injection now.     3. Polyarthralgia  -she has been dealing with chronic pain in feet, with intermittent swelling. Likely due to amlodipine. She has been told in the past RF is positive but has never been on any treatment  -negative RF, TRANG and CCP.     Follow up in about 3 months (around 10/28/2025).    Method of contact with patient concerns: [facility]    Disclaimer:  This note is prepared using voice recognition software and as such is likely to have errors and has not been proof read. Please contact me for questions. "     Christine Santiago M.D.  Rheumatology  [facility]

## 2025-07-28 NOTE — PROCEDURES
Trigger Point Injection    Performed by: Christine Santiago MD  Authorized by: Christine Santiago MD    Trapezus:  Left    Consent Done?:  Yes (Written)    Prep: patient was prepped and draped in usual sterile fashion     Local anesthesia used?: Yes    Local anesthetic:  Co-phenylcaine spray  Medications: 1 mL LIDOcaine (PF) 10 mg/ml (1%) 10 mg/mL (1 %); 40 mg triamcinolone acetonide 40 mg/mL

## 2025-07-28 NOTE — PROCEDURES
Large Joint Aspiration/Injection: R greater trochanteric bursa    Date/Time: 7/28/2025 3:00 PM    Performed by: Christine Santiago MD  Authorized by: Christine Santiago MD      Local anesthesia used?: Yes    Local anesthetic:  Co-phenylcaine spray    Details:  Needle Size:  25 G  Location:  Hip  Site:  R greater trochanteric bursa  Medications:  2 mL LIDOcaine (PF) 10 mg/ml (1%) 10 mg/mL (1 %); 40 mg triamcinolone acetonide 40 mg/mL  Patient tolerance:  Patient tolerated the procedure well with no immediate complications

## 2025-08-07 ENCOUNTER — LAB VISIT (OUTPATIENT)
Dept: LAB | Facility: HOSPITAL | Age: 85
End: 2025-08-07
Attending: NURSE PRACTITIONER
Payer: MEDICARE

## 2025-08-07 DIAGNOSIS — F41.9 ANXIETY: ICD-10-CM

## 2025-08-07 DIAGNOSIS — E78.2 MIXED HYPERLIPIDEMIA: ICD-10-CM

## 2025-08-07 DIAGNOSIS — I10 ESSENTIAL HYPERTENSION: ICD-10-CM

## 2025-08-07 DIAGNOSIS — N18.31 STAGE 3A CHRONIC KIDNEY DISEASE: ICD-10-CM

## 2025-08-07 DIAGNOSIS — D50.8 IRON DEFICIENCY ANEMIA SECONDARY TO INADEQUATE DIETARY IRON INTAKE: ICD-10-CM

## 2025-08-07 LAB
ABSOLUTE EOSINOPHIL (SMH): 0.05 K/UL
ABSOLUTE MONOCYTE (SMH): 0.6 K/UL (ref 0.3–1)
ABSOLUTE NEUTROPHIL COUNT (SMH): 3.4 K/UL (ref 1.8–7.7)
ALBUMIN SERPL-MCNC: 4.2 G/DL (ref 3.5–5.2)
ALBUMIN/CREAT UR: 12.5 UG/MG
ALP SERPL-CCNC: 64 UNIT/L (ref 55–135)
ALT SERPL-CCNC: 4 UNIT/L (ref 10–44)
ANION GAP (SMH): 8 MMOL/L (ref 8–16)
AST SERPL-CCNC: 11 UNIT/L (ref 10–40)
BASOPHILS # BLD AUTO: 0.01 K/UL
BASOPHILS NFR BLD AUTO: 0.2 %
BILIRUB SERPL-MCNC: 0.7 MG/DL (ref 0.1–1)
BUN SERPL-MCNC: 19 MG/DL (ref 8–23)
CALCIUM SERPL-MCNC: 9.6 MG/DL (ref 8.7–10.5)
CHLORIDE SERPL-SCNC: 104 MMOL/L (ref 95–110)
CHOLEST SERPL-MCNC: 205 MG/DL (ref 120–199)
CHOLEST/HDLC SERPL: 3.2 {RATIO} (ref 2–5)
CO2 SERPL-SCNC: 23 MMOL/L (ref 23–29)
CREAT SERPL-MCNC: 1 MG/DL (ref 0.5–1.4)
CREAT UR-MCNC: 180.4 MG/DL (ref 15–325)
ERYTHROCYTE [DISTWIDTH] IN BLOOD BY AUTOMATED COUNT: 15.1 % (ref 11.5–14.5)
FERRITIN SERPL-MCNC: 24.3 NG/ML (ref 20–300)
GFR SERPLBLD CREATININE-BSD FMLA CKD-EPI: 55 ML/MIN/1.73/M2
GLUCOSE SERPL-MCNC: 103 MG/DL (ref 70–110)
HCT VFR BLD AUTO: 38.3 % (ref 37–48.5)
HDLC SERPL-MCNC: 65 MG/DL (ref 40–75)
HDLC SERPL: 31.7 % (ref 20–50)
HGB BLD-MCNC: 12.7 GM/DL (ref 12–16)
IMM GRANULOCYTES # BLD AUTO: 0.02 K/UL (ref 0–0.04)
IMM GRANULOCYTES NFR BLD AUTO: 0.3 % (ref 0–0.5)
IRON SATN MFR SERPL: 12 % (ref 20–50)
IRON SERPL-MCNC: 45 UG/DL (ref 30–160)
LDLC SERPL CALC-MCNC: 125.4 MG/DL (ref 63–159)
LYMPHOCYTES # BLD AUTO: 2.3 K/UL (ref 1–4.8)
MCH RBC QN AUTO: 28.7 PG (ref 27–31)
MCHC RBC AUTO-ENTMCNC: 33.2 G/DL (ref 32–36)
MCV RBC AUTO: 87 FL (ref 82–98)
MICROALBUMIN UR-MCNC: 22.5 UG/ML
NONHDLC SERPL-MCNC: 140 MG/DL
NUCLEATED RBC (/100WBC) (SMH): 0 /100 WBC
PLATELET # BLD AUTO: 204 K/UL (ref 150–450)
PMV BLD AUTO: 11.2 FL (ref 9.2–12.9)
POTASSIUM SERPL-SCNC: 4.1 MMOL/L (ref 3.5–5.1)
PROT SERPL-MCNC: 6.8 GM/DL (ref 6–8.4)
RBC # BLD AUTO: 4.42 M/UL (ref 4–5.4)
RELATIVE EOSINOPHIL (SMH): 0.8 % (ref 0–8)
RELATIVE LYMPHOCYTE (SMH): 36.1 % (ref 18–48)
RELATIVE MONOCYTE (SMH): 9.4 % (ref 4–15)
RELATIVE NEUTROPHIL (SMH): 53.2 % (ref 38–73)
SODIUM SERPL-SCNC: 135 MMOL/L (ref 136–145)
TIBC SERPL-MCNC: 382 UG/DL (ref 250–450)
TRANSFERRIN SERPL-MCNC: 273 MG/DL (ref 200–375)
TRIGL SERPL-MCNC: 73 MG/DL (ref 30–150)
TSH SERPL-ACNC: 1.42 UIU/ML (ref 0.34–5.6)
WBC # BLD AUTO: 6.37 K/UL (ref 3.9–12.7)

## 2025-08-07 PROCEDURE — 85025 COMPLETE CBC W/AUTO DIFF WBC: CPT

## 2025-08-07 PROCEDURE — 82728 ASSAY OF FERRITIN: CPT

## 2025-08-07 PROCEDURE — 84443 ASSAY THYROID STIM HORMONE: CPT

## 2025-08-07 PROCEDURE — 82040 ASSAY OF SERUM ALBUMIN: CPT

## 2025-08-07 PROCEDURE — 80061 LIPID PANEL: CPT

## 2025-08-07 PROCEDURE — 36415 COLL VENOUS BLD VENIPUNCTURE: CPT | Mod: PO

## 2025-08-07 PROCEDURE — 82043 UR ALBUMIN QUANTITATIVE: CPT

## 2025-08-07 PROCEDURE — 84466 ASSAY OF TRANSFERRIN: CPT

## 2025-08-08 ENCOUNTER — OFFICE VISIT (OUTPATIENT)
Dept: FAMILY MEDICINE | Facility: CLINIC | Age: 85
End: 2025-08-08
Payer: MEDICARE

## 2025-08-08 VITALS
HEART RATE: 80 BPM | DIASTOLIC BLOOD PRESSURE: 68 MMHG | TEMPERATURE: 98 F | SYSTOLIC BLOOD PRESSURE: 124 MMHG | BODY MASS INDEX: 19.02 KG/M2 | OXYGEN SATURATION: 97 % | WEIGHT: 107.38 LBS

## 2025-08-08 DIAGNOSIS — I10 ESSENTIAL HYPERTENSION: Primary | ICD-10-CM

## 2025-08-08 DIAGNOSIS — D50.8 IRON DEFICIENCY ANEMIA SECONDARY TO INADEQUATE DIETARY IRON INTAKE: ICD-10-CM

## 2025-08-08 DIAGNOSIS — K59.00 CONSTIPATION, UNSPECIFIED CONSTIPATION TYPE: ICD-10-CM

## 2025-08-08 DIAGNOSIS — F41.9 ANXIETY: ICD-10-CM

## 2025-08-08 DIAGNOSIS — R63.4 ABNORMAL WEIGHT LOSS: ICD-10-CM

## 2025-08-08 DIAGNOSIS — N18.31 STAGE 3A CHRONIC KIDNEY DISEASE: ICD-10-CM

## 2025-08-08 DIAGNOSIS — M25.50 POLYARTHRALGIA: ICD-10-CM

## 2025-08-08 DIAGNOSIS — E78.2 MIXED HYPERLIPIDEMIA: ICD-10-CM

## 2025-08-08 PROCEDURE — 99999 PR PBB SHADOW E&M-EST. PATIENT-LVL III: CPT | Mod: PBBFAC,,, | Performed by: NURSE PRACTITIONER

## 2025-08-08 RX ORDER — ALPRAZOLAM 0.25 MG/1
0.25 TABLET ORAL 2 TIMES DAILY PRN
Qty: 60 TABLET | Refills: 0 | Status: SHIPPED | OUTPATIENT
Start: 2025-08-08

## 2025-08-08 NOTE — PROGRESS NOTES
SUBJECTIVE:      Patient ID: Martha Alonso is a 84 y.o. female.    Chief Complaint: Follow-up (3 month follow up   labs )    History of Present Illness    CHIEF COMPLAINT:  Ms. Alonso presents for a follow-up visit to discuss recent lab results and management of ongoing health issues, including recent COVID-19 infection and constipation.    HPI:  Ms. Alonso recently recovered from COVID-19, contracted at the end of June (approximately 27-28th). During the infection, she had diarrhea for a few days, followed by significant fatigue which is only now starting to improve. She also had substantial weight loss due to loss of appetite during and after the COVID infection.    She has been constipated for approximately 6-7 weeks, starting after her COVID-19 infection. Dulcolax laxative taken at night has not been effective.    She recently received three cortisone injections from Dr. Nichole, a rheumatologist, which have effectively managed her pain. As a result, she has not needed the prescribed Tramadol. She is hesitant about restarting physical therapy due to concerns about exacerbating her arthritis pain. Previous physical therapy sessions, which included weights and various exercises, aggravated her condition.    Iron supplements appear to be working effectively based on recent lab results. Xanax for anxiety management has been working well.    She denies chest pain, shortness of breath, and swelling in the legs.    MEDICATIONS:  Ms. Alonso is on Amlodipine 10 mg and Benazepril 20 mg for blood pressure management. She is also taking iron supplements for anemia and Xanax 0.25 mg for anxiety. Anti-inflammatory medications have been discontinued due to their impact on kidney function (eGFR).    MEDICAL HISTORY:  Ms. Alonso has a history of hypertension, anemia, CKD, HLD, and anxiety. She contracted COVID-19 from June 27-28, 2023.    TEST RESULTS:  Ms. Alonso's recent CBC looked good and was within normal limits. Her  iron studies showed improvement with total iron at 45, up from a previously low value of 28. Iron saturation was a little low, while ferritin was normal. Hemoglobin was 12.7, the highest it has been, previously as low as 10. Thyroid levels were normal. The cholesterol panel revealed a total cholesterol of 205, which has increased in the last 6 months. LDL was elevated at 125, while HDL was high at 65, which is considered good. Kidney function tests showed an eGFR of 55, down from 60 and previously 49 when on anti-inflammatories. Creatinine was normal, and sodium was slightly low at 135, almost 136.        Review of Systems   Constitutional:  Positive for unexpected weight change. Negative for activity change, appetite change, chills, diaphoresis, fatigue and fever.   HENT:  Positive for hearing loss. Negative for congestion, ear pain, sinus pressure, sore throat, trouble swallowing and voice change.    Eyes:  Negative for pain, discharge and visual disturbance.   Respiratory:  Negative for cough, chest tightness, shortness of breath and wheezing.    Cardiovascular:  Negative for chest pain and palpitations.        HTN and hyperlipidemia (managed with diet)   Gastrointestinal:  Negative for abdominal pain, constipation, diarrhea, nausea and vomiting.   Genitourinary:  Negative for difficulty urinating, flank pain, frequency and urgency.   Musculoskeletal:  Positive for arthralgias and gait problem (right hip pain). Negative for back pain, joint swelling and neck pain.        Joint pain all over   Skin:  Negative for color change and rash.   Neurological:  Negative for dizziness, seizures, syncope, weakness, numbness and headaches.   Hematological:  Negative for adenopathy.        Iron deficiency anemia, compliant with iron supplements   Psychiatric/Behavioral:  Negative for dysphoric mood and sleep disturbance. The patient is nervous/anxious.        No family history on file.   Social History     Socioeconomic  History    Marital status:    Occupational History    Occupation: retired   Tobacco Use    Smoking status: Never    Smokeless tobacco: Never   Substance and Sexual Activity    Alcohol use: Not Currently    Drug use: Never    Sexual activity: Not Currently     Current Outpatient Medications   Medication Sig    amLODIPine (NORVASC) 10 MG tablet Take 1 tablet (10 mg total) by mouth once daily.    benazepriL (LOTENSIN) 20 MG tablet Take 1 tablet (20 mg total) by mouth once daily.    diclofenac sodium (VOLTAREN) 1 % Gel Apply topically 2 (two) times daily.    famotidine (PEPCID) 20 MG tablet TAKE 1 TABLET(20 MG) BY MOUTH TWICE DAILY    ferrous gluconate (FERGON) 324 MG tablet Take 1 tablet (324 mg total) by mouth daily with breakfast.    ondansetron (ZOFRAN-ODT) 4 MG TbDL Take 1 tablet (4 mg total) by mouth every 12 (twelve) hours as needed (nausea).    traMADoL (ULTRAM) 50 mg tablet Take 1 tablet (50 mg total) by mouth every 24 hours as needed for Pain.    ALPRAZolam (XANAX) 0.25 MG tablet Take 1 tablet (0.25 mg total) by mouth 2 (two) times daily as needed for Anxiety.   Last reviewed on 8/8/2025 10:06 AM by Yesi Flores MA    Review of patient's allergies indicates:  No Known Allergies   Past Medical History:   Diagnosis Date    COVID 06/2025    Hypertension     Rheumatoid arthritis      No past surgical history on file.       OBJECTIVE:      Vitals:    08/08/25 1006   BP: 124/68   Pulse: 80   Temp: 97.8 °F (36.6 °C)   TempSrc: Oral   SpO2: 97%   Weight: 48.7 kg (107 lb 5.8 oz)     Physical Exam  Vitals and nursing note reviewed.   Constitutional:       General: She is awake. She is not in acute distress.     Appearance: Normal appearance. She is well-developed, well-groomed and normal weight. She is not ill-appearing, toxic-appearing or diaphoretic.   HENT:      Head: Normocephalic and atraumatic.      Nose: Nose normal.   Eyes:      General: Lids are normal. Gaze aligned appropriately.       Conjunctiva/sclera: Conjunctivae normal.      Right eye: Right conjunctiva is not injected.      Left eye: Left conjunctiva is not injected.      Pupils: Pupils are equal, round, and reactive to light.   Cardiovascular:      Rate and Rhythm: Normal rate and regular rhythm.      Pulses: Normal pulses.      Heart sounds: Normal heart sounds, S1 normal and S2 normal. No murmur heard.     No friction rub. No gallop.   Pulmonary:      Effort: Pulmonary effort is normal. No respiratory distress.      Breath sounds: Normal breath sounds. No stridor. No decreased breath sounds, wheezing, rhonchi or rales.   Chest:      Chest wall: No tenderness.   Musculoskeletal:      Cervical back: Neck supple.      Right lower leg: No edema.      Left lower leg: No edema.   Lymphadenopathy:      Cervical: No cervical adenopathy.   Skin:     General: Skin is warm and dry.      Capillary Refill: Capillary refill takes less than 2 seconds.      Findings: No erythema or rash.   Neurological:      Mental Status: She is alert and oriented to person, place, and time. Mental status is at baseline.   Psychiatric:         Attention and Perception: Attention normal.         Mood and Affect: Mood normal.         Speech: Speech normal.         Behavior: Behavior normal. Behavior is cooperative.         Thought Content: Thought content normal.         Judgment: Judgment normal.       Lab Visit on 08/07/2025   Component Date Value Ref Range Status    Sodium 08/07/2025 135 (L)  136 - 145 mmol/L Final    Potassium 08/07/2025 4.1  3.5 - 5.1 mmol/L Final    Chloride 08/07/2025 104  95 - 110 mmol/L Final    CO2 08/07/2025 23  23 - 29 mmol/L Final    Glucose 08/07/2025 103  70 - 110 mg/dL Final    BUN 08/07/2025 19  8 - 23 mg/dL Final    Creatinine 08/07/2025 1.0  0.5 - 1.4 mg/dL Final    Calcium 08/07/2025 9.6  8.7 - 10.5 mg/dL Final    Protein Total 08/07/2025 6.8  6.0 - 8.4 gm/dL Final    Albumin 08/07/2025 4.2  3.5 - 5.2 g/dL Final    Bilirubin Total  08/07/2025 0.7  0.1 - 1.0 mg/dL Final    For infants and newborns, interpretation of results should be based   on gestational age, weight and in agreement with clinical   observations.    Premature Infant recommended reference ranges:   0-24 hours:  <8.0 mg/dL   24-48 hours: <12.0 mg/dL   3-5 days:    <15.0 mg/dL   6-29 days:   <15.0 mg/dL    ALP 08/07/2025 64  55 - 135 unit/L Final    AST 08/07/2025 11  10 - 40 unit/L Final    ALT 08/07/2025 4 (L)  10 - 44 unit/L Final    Anion Gap 08/07/2025 8  8 - 16 mmol/L Final    eGFR 08/07/2025 55 (L)  >60 mL/min/1.73/m2 Final    Cholesterol Total 08/07/2025 205 (H)  120 - 199 mg/dL Final    The National Cholesterol Education Program (NCEP) has set the  following guidelines (reference ranges) for Cholesterol:  Optimal.....................<200 mg/dL  Borderline High.............200-239 mg/dL  High........................> or = 240 mg/dL    Triglyceride 08/07/2025 73  30 - 150 mg/dL Final    The National Cholesterol Education Program (NCEP) has set the  following guidelines (reference values) for triglycerides:  Normal......................<150 mg/dL  Borderline High.............150-199 mg/dL  High........................200-499 mg/dL      HDL Cholesterol 08/07/2025 65  40 - 75 mg/dL Final    The National Cholesterol Education Program (NCEP) has set the  following guidelines (reference values) for HDL Cholesterol:  Low...............<40 mg/dL  Optimal...........>60 mg/dL    LDL Cholesterol 08/07/2025 125.4  63.0 - 159.0 mg/dL Final    The National Cholesterol Education Program (NCEP) has set the  following guidelines (reference values) for LDL Cholesterol:  Optimal.......................<130 mg/dL  Borderline High...............130-159 mg/dL  High..........................160-189 mg/dL  Very High.....................>190 mg/dL      HDL/Cholesterol Ratio 08/07/2025 31.7  20.0 - 50.0 % Final    Cholesterol/HDL Ratio 08/07/2025 3.2  2.0 - 5.0 Final    Non HDL Cholesterol  08/07/2025 140  mg/dL Final    Risk category and Non-HDL cholesterol goals:  Coronary heart disease (CHD)or equivalent (10-year risk of CHD >20%):  Non-HDL cholesterol goal     <130 mg/dL  Two or more CHD risk factors and 10-year risk of CHD <= 20%:  Non-HDL cholesterol goal     <160 mg/dL  0 to 1 CHD risk factor:  Non-HDL cholesterol goal     <190 mg/dL    TSH 08/07/2025 1.417  0.340 - 5.600 uIU/mL Final    Iron Level 08/07/2025 45  30 - 160 ug/dL Final    Transferrin 08/07/2025 273  200 - 375 mg/dL Final    Iron Binding Capacity Total 08/07/2025 382  250 - 450 ug/dL Final    Iron Saturation 08/07/2025 12 (L)  20 - 50 % Final    Ferritin 08/07/2025 24.3  20.0 - 300.0 ng/mL Final    Urine Microalbumin 08/07/2025 22.5 (H)  <=19.8 ug/mL Final    Urine Creatinine 08/07/2025 180.4  15.0 - 325.0 mg/dL Final    The random urine reference ranges provided were established   for 24 hour urine collections.  No reference ranges exist for  random urine specimens.  Correlate clinically.    Microalbumin/Creatinine Ratio Urine 08/07/2025 12.5  <=30.0 ug/mg Final    WBC 08/07/2025 6.37  3.90 - 12.70 K/uL Final    RBC 08/07/2025 4.42  4.00 - 5.40 M/uL Final    Hgb 08/07/2025 12.7  12.0 - 16.0 gm/dL Final    Hct 08/07/2025 38.3  37.0 - 48.5 % Final    MCV 08/07/2025 87  82 - 98 fL Final    MCH 08/07/2025 28.7  27.0 - 31.0 pg Final    MCHC 08/07/2025 33.2  32.0 - 36.0 g/dL Final    RDW 08/07/2025 15.1 (H)  11.5 - 14.5 % Final    Platelet Count 08/07/2025 204  150 - 450 K/uL Final    MPV 08/07/2025 11.2  9.2 - 12.9 fL Final    Nucleated RBC 08/07/2025 0  <=0 /100 WBC Final    Neut % 08/07/2025 53.2  38 - 73 % Final    Lymph % 08/07/2025 36.1  18 - 48 % Final    Mono % 08/07/2025 9.4  4 - 15 % Final    Eos % 08/07/2025 0.8  0 - 8 % Final    Basophil % 08/07/2025 0.2  <=1.9 % Final    Imm Grans % 08/07/2025 0.3  0.0 - 0.5 % Final    Neut # 08/07/2025 3.4  1.8 - 7.7 K/uL Final    Lymph # 08/07/2025 2.30  1 - 4.8 K/uL Final    Mono #  08/07/2025 0.60  0.3 - 1 K/uL Final    Eos # 08/07/2025 0.05  <=0.5 K/uL Final    Baso # 08/07/2025 0.01  <=0.2 K/uL Final    Imm Grans # 08/07/2025 0.02  0.00 - 0.04 K/uL Final    Mild elevation in immature granulocytes is non specific and can be seen in a variety of conditions including stress response, acute inflammation, trauma and pregnancy. Correlation with other laboratory and clinical findings is essential.        Assessment:       1. Essential hypertension    2. Iron deficiency anemia secondary to inadequate dietary iron intake    3. Stage 3a chronic kidney disease    4. Mixed hyperlipidemia    5. Anxiety    6. Constipation, unspecified constipation type    7. Polyarthralgia    8. Abnormal weight loss        Plan:       Assessment & Plan    PLAN SUMMARY:  - Increase fiber intake,  supplement from medications by 2 hours  - Prescribed Miralax for daily morning use to manage constipation  - Continue iron supplements to maintain improved levels  - Continue Amlodipine 10 mg and Benazepril 20 mg for hypertension management  - Refilled Xanax prescription for anxiety management  - Increase fluid intake to address dehydration and related issues  - Consider starting physical therapy for arthritis pain management  - Use Tramadol if arthritis pain returns  - Contact office if weight continues to drop or appetite does not improve  - Contact office if constipation persists after trying Miralax  - Follow-up to reassess symptoms and consider appetite stimulant if needed    POST-COVID CONDITION:  - Evaluated patient's post-COVID symptoms including fatigue, weight loss, and constipation.  - Noted that fatigue and weight loss are improving, but constipation has been ongoing since COVID infection with Dulcolax proving ineffective.  - Weight loss was initially due to lack of appetite post-COVID but is now improving as other symptoms resolve.  - Discussed potential relationship between post-COVID syndrome, dehydration,  and current symptoms.    CHRONIC CONSTIPATION:  - Prescribed Miralax to be taken daily in the morning, mixed with juice or coffee for constipation management.  - Educated patient on importance of hydration and recommended increasing fluid intake to help with constipation.  - Discussed potential causes including dehydration and medication side effects.  - Recommend increasing fiber intake, ensuring to separate fiber supplement from medications by 2 hours.  - Instructed patient to contact the office if constipation persists after trying Miralax.    ABNORMAL WEIGHT LOSS:  - Discussed weight loss and monitoring plan.  - Will consider appetite stimulant if symptoms do not improve.  - Instructed patient to contact the office if weight continues to drop or appetite does not improve.    ESSENTIAL HYPERTENSION:  - Blood pressure measured at 124/68 today, well-controlled with current medication regimen.  - Continued prescription of Amlodipine 10 mg and Benazepril 20 mg for hypertension management.    IRON DEFICIENCY ANEMIA:  - Noted improvement in iron levels with supplementation: total iron increased from 28 to 45, and hemoglobin improved from 10 to 12.7, though iron saturation remains low.  - Recommend continued use of iron supplements to maintain improved levels.  - Confirmed CBC and iron studies show improvement in anemia with current supplementation.    ANXIETY DISORDER:  - Evaluated that Xanax 0.25 mg has been effective for managing anxiety.  - Prescribed refill of Xanax for continued anxiety management.  -  reviewed.    CHRONIC KIDNEY DISEASE:  - Current eGFR is 55, improved from previous drop to 49 when patient was on anti-inflammatories.  - Creatinine is normal, with mild dehydration likely contributing t  o kidney function changes.  - Discussed potential impact of dehydration and anti-inflammatories on kidney function.  - Planned to monitor kidney function and emphasized importance of increased fluid intake for  renal health.    HYPERLIPIDEMIA:  - Cholesterol levels: total 205, , HDL 65.  - These have worsened slightly over the last 6 months, but HDL remains high.  - Will hold off on medication due to lack of cardiac risk factors.  - Limit red meat, butter, fried foods, cheese, and other foods that have a lot of saturated fat.  - Consume more: lean meats, fish, fruits, vegetables, whole grains, beans, lentils, and nuts.      ARTHRITIS:  - Ms. Alonso is contemplating physical therapy for arthritis pain but is concerned it might worsen symptoms.  - Noted that previous steroid injections have been effective in managing arthritis pain, though the effect is temporary.  - Discussed potential benefits and drawbacks of physical therapy for arthritis pain management.  - Considered starting physical therapy and using Tramadol if arthritis pain returns.  - Explained Tramadol's relative strength compared to other pain medications and evaluated risks and benefits of physical therapy for arthritis management.    FOLLOW-UP:  - Follow-up in 3 months for anxiety.        Essential hypertension    Iron deficiency anemia secondary to inadequate dietary iron intake    Stage 3a chronic kidney disease    Mixed hyperlipidemia    Anxiety  -     ALPRAZolam (XANAX) 0.25 MG tablet; Take 1 tablet (0.25 mg total) by mouth 2 (two) times daily as needed for Anxiety.  Dispense: 60 tablet; Refill: 0    Constipation, unspecified constipation type    Polyarthralgia    Abnormal weight loss          Follow up in about 3 months (around 11/8/2025).          8/8/2025 ELINA Johnston, AARONP    This note was generated with the assistance of ambient listening technology. Verbal consent was obtained by the patient and accompanying visitor(s) for the recording of patient appointment to facilitate this note. I attest to having reviewed and edited the generated note for accuracy, though some syntax or spelling errors may persist. Please contact the author  of this note for any clarification.

## 2025-08-26 ENCOUNTER — TELEPHONE (OUTPATIENT)
Dept: FAMILY MEDICINE | Facility: CLINIC | Age: 85
End: 2025-08-26
Payer: MEDICARE

## 2025-08-26 ENCOUNTER — HOSPITAL ENCOUNTER (OUTPATIENT)
Dept: RADIOLOGY | Facility: HOSPITAL | Age: 85
Discharge: HOME | End: 2025-08-26
Attending: NURSE PRACTITIONER
Payer: MEDICARE

## 2025-08-26 DIAGNOSIS — K59.00 CONSTIPATION, UNSPECIFIED CONSTIPATION TYPE: Primary | ICD-10-CM

## 2025-08-26 DIAGNOSIS — K59.00 CONSTIPATION, UNSPECIFIED CONSTIPATION TYPE: ICD-10-CM

## 2025-08-26 PROCEDURE — 74018 RADEX ABDOMEN 1 VIEW: CPT | Mod: TC,PO

## 2025-08-26 PROCEDURE — 74018 RADEX ABDOMEN 1 VIEW: CPT | Mod: 26,,, | Performed by: RADIOLOGY
